# Patient Record
Sex: FEMALE | Race: WHITE | NOT HISPANIC OR LATINO
[De-identification: names, ages, dates, MRNs, and addresses within clinical notes are randomized per-mention and may not be internally consistent; named-entity substitution may affect disease eponyms.]

---

## 2017-01-23 ENCOUNTER — APPOINTMENT (OUTPATIENT)
Dept: GERIATRICS | Facility: CLINIC | Age: 47
End: 2017-01-23

## 2017-03-21 ENCOUNTER — APPOINTMENT (OUTPATIENT)
Dept: DERMATOLOGY | Facility: CLINIC | Age: 47
End: 2017-03-21

## 2017-03-21 VITALS — DIASTOLIC BLOOD PRESSURE: 80 MMHG | SYSTOLIC BLOOD PRESSURE: 122 MMHG

## 2017-03-21 DIAGNOSIS — L30.9 DERMATITIS, UNSPECIFIED: ICD-10-CM

## 2017-05-05 ENCOUNTER — OUTPATIENT (OUTPATIENT)
Dept: OUTPATIENT SERVICES | Facility: HOSPITAL | Age: 47
LOS: 1 days | Discharge: HOME | End: 2017-05-05

## 2017-05-05 DIAGNOSIS — C50.919 MALIGNANT NEOPLASM OF UNSPECIFIED SITE OF UNSPECIFIED FEMALE BREAST: ICD-10-CM

## 2017-05-23 ENCOUNTER — TRANSCRIPTION ENCOUNTER (OUTPATIENT)
Age: 47
End: 2017-05-23

## 2017-05-31 ENCOUNTER — OUTPATIENT (OUTPATIENT)
Dept: OUTPATIENT SERVICES | Facility: HOSPITAL | Age: 47
LOS: 1 days | Discharge: HOME | End: 2017-05-31

## 2017-05-31 DIAGNOSIS — C50.919 MALIGNANT NEOPLASM OF UNSPECIFIED SITE OF UNSPECIFIED FEMALE BREAST: ICD-10-CM

## 2017-06-06 ENCOUNTER — APPOINTMENT (OUTPATIENT)
Dept: HEMATOLOGY ONCOLOGY | Facility: CLINIC | Age: 47
End: 2017-06-06

## 2017-06-06 VITALS
DIASTOLIC BLOOD PRESSURE: 80 MMHG | TEMPERATURE: 98.1 F | HEART RATE: 66 BPM | SYSTOLIC BLOOD PRESSURE: 120 MMHG | RESPIRATION RATE: 16 BRPM | WEIGHT: 168.65 LBS | BODY MASS INDEX: 28.95 KG/M2 | OXYGEN SATURATION: 99 %

## 2017-06-06 RX ORDER — CEPHALEXIN 500 MG/1
500 CAPSULE ORAL
Qty: 30 | Refills: 0 | Status: DISCONTINUED | COMMUNITY
Start: 2017-05-24

## 2017-06-06 RX ORDER — DIVALPROEX SODIUM 250 MG/1
250 TABLET, EXTENDED RELEASE ORAL
Qty: 360 | Refills: 0 | Status: ACTIVE | COMMUNITY
Start: 2017-03-23

## 2017-06-06 RX ORDER — TACROLIMUS 1 MG/G
0.1 OINTMENT TOPICAL
Qty: 1 | Refills: 3 | Status: DISCONTINUED | COMMUNITY
Start: 2017-03-21 | End: 2017-06-06

## 2017-07-26 ENCOUNTER — EMERGENCY (EMERGENCY)
Facility: HOSPITAL | Age: 47
LOS: 1 days | Discharge: HOME | End: 2017-07-26
Attending: EMERGENCY MEDICINE | Admitting: EMERGENCY MEDICINE
Payer: COMMERCIAL

## 2017-07-26 VITALS
TEMPERATURE: 98 F | RESPIRATION RATE: 18 BRPM | OXYGEN SATURATION: 95 % | DIASTOLIC BLOOD PRESSURE: 84 MMHG | HEART RATE: 80 BPM | SYSTOLIC BLOOD PRESSURE: 120 MMHG

## 2017-07-26 VITALS
HEART RATE: 110 BPM | RESPIRATION RATE: 18 BRPM | SYSTOLIC BLOOD PRESSURE: 140 MMHG | TEMPERATURE: 99 F | OXYGEN SATURATION: 99 % | DIASTOLIC BLOOD PRESSURE: 89 MMHG

## 2017-07-26 DIAGNOSIS — Z90.13 ACQUIRED ABSENCE OF BILATERAL BREASTS AND NIPPLES: Chronic | ICD-10-CM

## 2017-07-26 LAB
APTT BLD: 28.9 SEC — SIGNIFICANT CHANGE UP (ref 27.5–37.4)
BASOPHILS # BLD AUTO: 0.1 K/UL — SIGNIFICANT CHANGE UP (ref 0–0.2)
BASOPHILS NFR BLD AUTO: 1.2 % — SIGNIFICANT CHANGE UP (ref 0–2)
EOSINOPHIL # BLD AUTO: 0.4 K/UL — SIGNIFICANT CHANGE UP (ref 0–0.5)
EOSINOPHIL NFR BLD AUTO: 4.1 % — SIGNIFICANT CHANGE UP (ref 0–6)
HCG SERPL-ACNC: <2 MIU/ML — SIGNIFICANT CHANGE UP
HCT VFR BLD CALC: 42.4 % — SIGNIFICANT CHANGE UP (ref 34.5–45)
HGB BLD-MCNC: 15 G/DL — SIGNIFICANT CHANGE UP (ref 11.5–15.5)
INR BLD: 1 RATIO — SIGNIFICANT CHANGE UP (ref 0.88–1.16)
LYMPHOCYTES # BLD AUTO: 2.8 K/UL — SIGNIFICANT CHANGE UP (ref 1–3.3)
LYMPHOCYTES # BLD AUTO: 32.5 % — SIGNIFICANT CHANGE UP (ref 13–44)
MAGNESIUM SERPL-MCNC: 2.3 MG/DL — SIGNIFICANT CHANGE UP (ref 1.6–2.6)
MCHC RBC-ENTMCNC: 34.3 PG — HIGH (ref 27–34)
MCHC RBC-ENTMCNC: 35.3 GM/DL — SIGNIFICANT CHANGE UP (ref 32–36)
MCV RBC AUTO: 97.3 FL — SIGNIFICANT CHANGE UP (ref 80–100)
MONOCYTES # BLD AUTO: 0.7 K/UL — SIGNIFICANT CHANGE UP (ref 0–0.9)
MONOCYTES NFR BLD AUTO: 7.9 % — SIGNIFICANT CHANGE UP (ref 2–14)
NEUTROPHILS # BLD AUTO: 4.8 K/UL — SIGNIFICANT CHANGE UP (ref 1.8–7.4)
NEUTROPHILS NFR BLD AUTO: 54.3 % — SIGNIFICANT CHANGE UP (ref 43–77)
PHOSPHATE SERPL-MCNC: 3.1 MG/DL — SIGNIFICANT CHANGE UP (ref 2.5–4.5)
PLATELET # BLD AUTO: 220 K/UL — SIGNIFICANT CHANGE UP (ref 150–400)
PROTHROM AB SERPL-ACNC: 10.9 SEC — SIGNIFICANT CHANGE UP (ref 9.8–12.7)
RBC # BLD: 4.36 M/UL — SIGNIFICANT CHANGE UP (ref 3.8–5.2)
RBC # FLD: 11.5 % — SIGNIFICANT CHANGE UP (ref 10.3–14.5)
WBC # BLD: 8.8 K/UL — SIGNIFICANT CHANGE UP (ref 3.8–10.5)
WBC # FLD AUTO: 8.8 K/UL — SIGNIFICANT CHANGE UP (ref 3.8–10.5)

## 2017-07-26 PROCEDURE — 85730 THROMBOPLASTIN TIME PARTIAL: CPT

## 2017-07-26 PROCEDURE — 85610 PROTHROMBIN TIME: CPT

## 2017-07-26 PROCEDURE — 84100 ASSAY OF PHOSPHORUS: CPT

## 2017-07-26 PROCEDURE — 96375 TX/PRO/DX INJ NEW DRUG ADDON: CPT | Mod: XU

## 2017-07-26 PROCEDURE — 99285 EMERGENCY DEPT VISIT HI MDM: CPT | Mod: 25

## 2017-07-26 PROCEDURE — 93005 ELECTROCARDIOGRAM TRACING: CPT

## 2017-07-26 PROCEDURE — 70496 CT ANGIOGRAPHY HEAD: CPT | Mod: 26

## 2017-07-26 PROCEDURE — 96374 THER/PROPH/DIAG INJ IV PUSH: CPT | Mod: XU

## 2017-07-26 PROCEDURE — 70496 CT ANGIOGRAPHY HEAD: CPT

## 2017-07-26 PROCEDURE — 85027 COMPLETE CBC AUTOMATED: CPT

## 2017-07-26 PROCEDURE — 93010 ELECTROCARDIOGRAM REPORT: CPT

## 2017-07-26 PROCEDURE — 84702 CHORIONIC GONADOTROPIN TEST: CPT

## 2017-07-26 PROCEDURE — 83735 ASSAY OF MAGNESIUM: CPT

## 2017-07-26 PROCEDURE — 99284 EMERGENCY DEPT VISIT MOD MDM: CPT | Mod: 25

## 2017-07-26 PROCEDURE — 80053 COMPREHEN METABOLIC PANEL: CPT

## 2017-07-26 RX ORDER — ASPIRIN/CALCIUM CARB/MAGNESIUM 324 MG
81 TABLET ORAL ONCE
Qty: 0 | Refills: 0 | Status: COMPLETED | OUTPATIENT
Start: 2017-07-26 | End: 2017-07-26

## 2017-07-26 RX ORDER — DIPHENHYDRAMINE HCL 50 MG
25 CAPSULE ORAL ONCE
Qty: 0 | Refills: 0 | Status: COMPLETED | OUTPATIENT
Start: 2017-07-26 | End: 2017-07-26

## 2017-07-26 RX ORDER — SODIUM CHLORIDE 9 MG/ML
1000 INJECTION INTRAMUSCULAR; INTRAVENOUS; SUBCUTANEOUS ONCE
Qty: 0 | Refills: 0 | Status: COMPLETED | OUTPATIENT
Start: 2017-07-26 | End: 2017-07-26

## 2017-07-26 RX ORDER — ACETAMINOPHEN 500 MG
1000 TABLET ORAL ONCE
Qty: 0 | Refills: 0 | Status: COMPLETED | OUTPATIENT
Start: 2017-07-26 | End: 2017-07-26

## 2017-07-26 RX ORDER — METOCLOPRAMIDE HCL 10 MG
10 TABLET ORAL ONCE
Qty: 0 | Refills: 0 | Status: COMPLETED | OUTPATIENT
Start: 2017-07-26 | End: 2017-07-26

## 2017-07-26 RX ORDER — ATORVASTATIN CALCIUM 80 MG/1
80 TABLET, FILM COATED ORAL ONCE
Qty: 0 | Refills: 0 | Status: COMPLETED | OUTPATIENT
Start: 2017-07-26 | End: 2017-07-26

## 2017-07-26 RX ADMIN — Medication 10 MILLIGRAM(S): at 10:50

## 2017-07-26 RX ADMIN — Medication 1000 MILLIGRAM(S): at 11:30

## 2017-07-26 RX ADMIN — Medication 81 MILLIGRAM(S): at 16:31

## 2017-07-26 RX ADMIN — Medication 400 MILLIGRAM(S): at 10:54

## 2017-07-26 RX ADMIN — Medication 25 MILLIGRAM(S): at 10:55

## 2017-07-26 RX ADMIN — ATORVASTATIN CALCIUM 80 MILLIGRAM(S): 80 TABLET, FILM COATED ORAL at 16:31

## 2017-07-26 RX ADMIN — SODIUM CHLORIDE 1000 MILLILITER(S): 9 INJECTION INTRAMUSCULAR; INTRAVENOUS; SUBCUTANEOUS at 10:48

## 2017-07-26 NOTE — ED ADULT NURSE REASSESSMENT NOTE - NS ED NURSE REASSESS COMMENT FT1
1400 ambulated in hallway to bathroom. Limping on left foot. States" symptoms are less than earlier but she still has double vision and left leg and arm numbness. A&Ox3. color pink. Skin W&D

## 2017-07-26 NOTE — ED PROVIDER NOTE - MEDICAL DECISION MAKING DETAILS
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 possible CVA vs. dural venous thrombosis. Migraine improved s/p cocktail of Reglan, Benadryl and Tylenol. CT venogram negative for venous thrombosis/hemorrhage/mass, Neurology consulted and offered admission for possible small acute CVA however patient decline, opting to leaving against medical advice but agreeable to atorvastatin x1 and ASA x1.

## 2017-07-26 NOTE — ED PROVIDER NOTE - OBJECTIVE STATEMENT
47F with anxiety/depression presents for severe 10/10 headache associated w/ left-sided facial and body numbness. 47F with history of breast CA s/p B/L mastectomy on Tamoxifen, anxiety/depression, migraines presents for severe 10/10 headache associated w/ left-sided facial and body numbness. Symptoms began last night at pt watching TV and did not resolve this morning. Pt also notes blurry vision and left-sided weakness which began 1 day prior to presentation. No photophobia, 47F with history of breast CA s/p B/L mastectomy on Tamoxifen, anxiety/depression, migraines presents for severe 10/10 headache associated w/ left-sided facial and body numbness. Symptoms began last night at pt watching TV and did not resolve this morning. Pt also notes double vision, left-sided weakness and some difficulty w/ speech also beginning last night. No photophobia, bowel/bladder incontinence, difficulty swallowing. 47F with history of breast CA s/p B/L mastectomy on Tamoxifen, anxiety/depression, migraines presents for severe 10/10 headache associated w/ left-sided facial and body numbness. Symptoms began last night at pt watching TV and did not resolve this morning. Pt also notes double vision, left-sided weakness and some difficulty w/ speech also beginning last night. No photophobia, bowel/bladder incontinence or difficulty swallowing.

## 2017-07-26 NOTE — ED PROVIDER NOTE - ATTENDING CONTRIBUTION TO CARE
attending Mallika: 47yF remote breast CA s/p mastectomy on Tamoxifen, migraine HA presents with gradual onset of L sided HA associated with L facial and L sided body numbness since yesterday. L side of body feels "heavy" and mildly weaker than right. Different from typical migraine. Also with blurry vision bilaterally. on examination, uncomfortable 2/2 headache, L sided facial/LUE/LLE decreased sensation, mildly weaker LLE and LUE compared to right, normal finger to nose, no pronator drift. Complex/hemiplegic migraine vs cerebral venous sinus thrombosis vs ?CVA. Will obtain CT head, CT venogram, labwork, neurology consultation and reassess.

## 2017-07-26 NOTE — ED PROVIDER NOTE - PROGRESS NOTE DETAILS
CT venogram negative for dural venous thrombosis, mass, hemorrhage or acute infarct. Headache improved s/p Benadryl IV and Reglan IV however pt still reports L-sided numbness/tingling. attending Mallika: Patient upset to have to wait to see neurology, requests MRI emergently because she states "my father is a doctor and he wants me to get it." Awaiting neurology evaluation. No indication for emergent MRI at this time. attending Mallika: Patient evaluated by neurology. 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 Offered admission to neurology for MRI and further management. Patient declines admission, wishes to leave to go to ER in [place] where her father works. Lengthy discussion with patient regarding risks of leaving. Patient understands and wishes to leave. Will dc AMA with ASA and Lipitor scripts. attending Mallika: Patient requests MRI emergently because she states "my father is a doctor and he wants me to get it." Awaiting neurology evaluation. No indication for emergent MRI at this time. Will continue to monitor closely. attending Mallika: Patient evaluated by neurology. 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 Offered admission to neurology for MRI and further management. Patient declines admission, wishes to leave to go to ER in [place] where her father works. Lengthy discussion with patient regarding risks of leaving. Patient understands and wishes to leave. Will dc AMA with ASA and Lipitor.

## 2017-07-26 NOTE — ED ADULT NURSE NOTE - OBJECTIVE STATEMENT
1004 47 yr old WF brought to ER by  for further eval and tx of double vision, severe HA and left sided numbness since 11pm last night. Hx of Breast cancer/bilateral mastectomy 4 yrs ago. Denies fever or chills. slurred speech. anxious. PERRL. On Tamoxifen x 4 yrs. no chemo taken. States difficulty walking. MAEx4

## 2017-07-26 NOTE — CONSULT NOTE ADULT - ASSESSMENT
47F with history of breast CA s/p B/L mastectomy on Tamoxifen, anxiety/depression, migraines presents for severe headache associated w/ left-sided facial and body numbness. PE was non-focal and unremarkable. CT head was negative. Differential diagnosis includes complicated migraine vs small stroke. Patient was asked for in-patient admission and MRI to rule out small stroke. Patient's father is a doctor and run a big hospital in [place]. He discussed with the team that he will do all workup at his hospital. Patient refuses admission at this time. For migraine, please administer Dexamethasone 10 mg and give Medrol pack on discharge.

## 2017-07-26 NOTE — CONSULT NOTE ADULT - SUBJECTIVE AND OBJECTIVE BOX
HPI:    47F with history of breast CA s/p B/L mastectomy on Tamoxifen, anxiety/depression, migraines presents for severe headache associated w/ left-sided facial and body numbness.     She was in her usual state of health until yesterday. Around 11 pm, she had sudden onset of double vision and numbness on her left side of the body including her face. Her symptoms worsened in the morning and she felt weakness on her left side.     She denied photophobia, nausea, bowel/bladder incontinence or difficulty swallowing.         PAST MEDICAL & SURGICAL HISTORY:  Anxiety  Depression  Migraine headache  Breast cancer  H/O bilateral mastectomy    FAMILY HISTORY:    Allergies    No Known Allergies    Intolerances        SHx - No smoking, No ETOH, No drug abuse        Vital Signs Last 24 Hrs    T(C): 36.9 (26 Jul 2017 16:25), Max: 37 (26 Jul 2017 09:51)  T(F): 98.4 (26 Jul 2017 16:25), Max: 98.6 (26 Jul 2017 09:51)  HR: 80 (26 Jul 2017 16:25) (68 - 110)  BP: 120/84 (26 Jul 2017 16:25) (110/73 - 140/89)  BP(mean): --  RR: 18 (26 Jul 2017 16:25) (16 - 18)  SpO2: 95% (26 Jul 2017 16:25) (95% - 99%)    General Exam:     General appearance: No acute distress                   Neurological Exam:    Mental Status: Orientated to self, date and place.  Attention intact.  No dysarthria, aphasia or neglect.  Knowledge intact.      Cranial Nerves:   PERRL, EOMI, VFF, no nystagmus or diplopia.  CN V1-3 intact to light touch and pinprick.  No facial asymmetry.  Hearing intact to finger rub bilaterally.  Tongue, uvula and palate midline.  Sternocleidomastoid and Trapezius intact bilaterally.    Motor:   Tone: normal.                  Strength:     [] Upper extremity                      Delt       Bicep    Tricep                                                  R         5/5        5/5        5/5       5/5                                               L          5/5        5/5        5/5       5/5  [] Lower extremity                       HF          KE          KF        DF         PF                                               R        5/5        5/5        5/5       5/5       5/5                                               L         5/5        5/5       5/5       5/5        5/5  Pronator drift: none                 Dysmetria: None to finger-nose-finger or heel-shin-heel  No truncal ataxia.    Tremor: No resting, postural or action tremor.  No myoclonus.    Sensation: intact to light touch, pinprick, vibration and proprioception    Deep Tendon Reflexes: 3+ bilateral biceps, triceps, brachioradialis, knee and ankle, non-sustained clonus bilaterally   Toes flexor bilaterally    Gait: normal and stable.      Other:    07-26    142  |  100  |  14  ----------------------------<  84  4.2   |  28  |  0.85    Ca    9.3      26 Jul 2017 11:09  Phos  3.1     07-26  Mg     2.3     07-26    TPro  7.0  /  Alb  4.3  /  TBili  0.2  /  DBili  x   /  AST  20  /  ALT  16  /  AlkPhos  54  07-26 07-26    142  |  100  |  14  ----------------------------<  84  4.2   |  28  |  0.85    Ca    9.3      26 Jul 2017 11:09  Phos  3.1     07-26  Mg     2.3     07-26    TPro  7.0  /  Alb  4.3  /  TBili  0.2  /  DBili  x   /  AST  20  /  ALT  16  /  AlkPhos  54  07-26                          15.0   8.8   )-----------( 220      ( 26 Jul 2017 11:09 )             42.4       Radiology    CT head     < from: CT Head w/o Cont (10.18.12 @ 14:48) >  IMPRESSION:  Unremarkable noncontrast CT of the brain.    < end of copied text >

## 2017-10-12 ENCOUNTER — APPOINTMENT (OUTPATIENT)
Dept: NEUROLOGY | Facility: CLINIC | Age: 47
End: 2017-10-12
Payer: COMMERCIAL

## 2017-10-12 VITALS
HEIGHT: 64 IN | DIASTOLIC BLOOD PRESSURE: 74 MMHG | WEIGHT: 167 LBS | BODY MASS INDEX: 28.51 KG/M2 | HEART RATE: 56 BPM | SYSTOLIC BLOOD PRESSURE: 125 MMHG

## 2017-10-12 DIAGNOSIS — G43.109 MIGRAINE WITH AURA, NOT INTRACTABLE, W/OUT STATUS MIGRAINOSUS: ICD-10-CM

## 2017-10-12 PROCEDURE — 99244 OFF/OP CNSLTJ NEW/EST MOD 40: CPT

## 2017-11-20 ENCOUNTER — APPOINTMENT (OUTPATIENT)
Dept: NEUROLOGY | Facility: CLINIC | Age: 47
End: 2017-11-20

## 2017-12-06 ENCOUNTER — OUTPATIENT (OUTPATIENT)
Dept: OUTPATIENT SERVICES | Facility: HOSPITAL | Age: 47
LOS: 1 days | Discharge: HOME | End: 2017-12-06

## 2017-12-06 DIAGNOSIS — C50.919 MALIGNANT NEOPLASM OF UNSPECIFIED SITE OF UNSPECIFIED FEMALE BREAST: ICD-10-CM

## 2017-12-06 DIAGNOSIS — Z90.13 ACQUIRED ABSENCE OF BILATERAL BREASTS AND NIPPLES: Chronic | ICD-10-CM

## 2017-12-12 ENCOUNTER — APPOINTMENT (OUTPATIENT)
Dept: HEMATOLOGY ONCOLOGY | Facility: CLINIC | Age: 47
End: 2017-12-12
Payer: COMMERCIAL

## 2017-12-12 VITALS
HEART RATE: 79 BPM | OXYGEN SATURATION: 95 % | RESPIRATION RATE: 16 BRPM | DIASTOLIC BLOOD PRESSURE: 73 MMHG | TEMPERATURE: 98.8 F | BODY MASS INDEX: 27.89 KG/M2 | WEIGHT: 162.5 LBS | SYSTOLIC BLOOD PRESSURE: 111 MMHG

## 2017-12-12 DIAGNOSIS — M79.604 PAIN IN RIGHT LEG: ICD-10-CM

## 2017-12-12 DIAGNOSIS — M79.605 PAIN IN RIGHT LEG: ICD-10-CM

## 2017-12-12 PROCEDURE — 99214 OFFICE O/P EST MOD 30 MIN: CPT

## 2017-12-12 RX ORDER — MAGNESIUM OXIDE 241.3 MG/1000MG
400 TABLET ORAL TWICE DAILY
Qty: 60 | Refills: 3 | Status: DISCONTINUED | COMMUNITY
Start: 2017-10-12 | End: 2017-12-12

## 2018-03-12 ENCOUNTER — APPOINTMENT (OUTPATIENT)
Dept: FAMILY MEDICINE | Facility: CLINIC | Age: 48
End: 2018-03-12
Payer: COMMERCIAL

## 2018-03-12 VITALS
OXYGEN SATURATION: 95 % | DIASTOLIC BLOOD PRESSURE: 75 MMHG | HEIGHT: 64 IN | SYSTOLIC BLOOD PRESSURE: 112 MMHG | TEMPERATURE: 99 F | HEART RATE: 96 BPM

## 2018-03-12 DIAGNOSIS — R09.82 POSTNASAL DRIP: ICD-10-CM

## 2018-03-12 PROCEDURE — 99203 OFFICE O/P NEW LOW 30 MIN: CPT

## 2018-03-12 RX ORDER — DEXTROAMPHETAMINE SACCHARATE, AMPHETAMINE ASPARTATE, DEXTROAMPHETAMINE SULFATE AND AMPHETAMINE SULFATE 1.25; 1.25; 1.25; 1.25 MG/1; MG/1; MG/1; MG/1
5 TABLET ORAL
Qty: 60 | Refills: 0 | Status: COMPLETED | COMMUNITY
Start: 2017-03-23 | End: 2018-03-12

## 2018-03-12 RX ORDER — FAMCICLOVIR 500 MG/1
500 TABLET, FILM COATED ORAL
Qty: 60 | Refills: 0 | Status: COMPLETED | COMMUNITY
Start: 2017-04-19 | End: 2018-03-12

## 2018-03-12 RX ORDER — HYDROCORTISONE 25 MG/G
2.5 OINTMENT TOPICAL
Qty: 1 | Refills: 1 | Status: COMPLETED | COMMUNITY
Start: 2017-03-21 | End: 2018-03-12

## 2018-05-03 ENCOUNTER — APPOINTMENT (OUTPATIENT)
Dept: RHEUMATOLOGY | Facility: CLINIC | Age: 48
End: 2018-05-03

## 2018-07-13 ENCOUNTER — RX RENEWAL (OUTPATIENT)
Age: 48
End: 2018-07-13

## 2018-08-08 PROBLEM — G43.909 MIGRAINE, UNSPECIFIED, NOT INTRACTABLE, WITHOUT STATUS MIGRAINOSUS: Chronic | Status: ACTIVE | Noted: 2017-07-26

## 2018-10-25 ENCOUNTER — APPOINTMENT (OUTPATIENT)
Dept: OBGYN | Facility: CLINIC | Age: 48
End: 2018-10-25
Payer: COMMERCIAL

## 2018-10-25 ENCOUNTER — ASOB RESULT (OUTPATIENT)
Age: 48
End: 2018-10-25

## 2018-10-25 VITALS
HEART RATE: 93 BPM | HEIGHT: 64 IN | WEIGHT: 159.5 LBS | BODY MASS INDEX: 27.23 KG/M2 | DIASTOLIC BLOOD PRESSURE: 79 MMHG | SYSTOLIC BLOOD PRESSURE: 124 MMHG

## 2018-10-25 DIAGNOSIS — R10.30 LOWER ABDOMINAL PAIN, UNSPECIFIED: ICD-10-CM

## 2018-10-25 PROCEDURE — 99214 OFFICE O/P EST MOD 30 MIN: CPT

## 2018-10-25 PROCEDURE — 76830 TRANSVAGINAL US NON-OB: CPT

## 2018-10-29 ENCOUNTER — OTHER (OUTPATIENT)
Age: 48
End: 2018-10-29

## 2018-10-30 ENCOUNTER — OTHER (OUTPATIENT)
Age: 48
End: 2018-10-30

## 2018-11-16 ENCOUNTER — APPOINTMENT (OUTPATIENT)
Dept: INTERNAL MEDICINE | Facility: CLINIC | Age: 48
End: 2018-11-16

## 2018-12-29 ENCOUNTER — TRANSCRIPTION ENCOUNTER (OUTPATIENT)
Age: 48
End: 2018-12-29

## 2019-01-15 ENCOUNTER — OUTPATIENT (OUTPATIENT)
Dept: OUTPATIENT SERVICES | Facility: HOSPITAL | Age: 49
LOS: 1 days | Discharge: HOME | End: 2019-01-15

## 2019-01-15 DIAGNOSIS — Z90.13 ACQUIRED ABSENCE OF BILATERAL BREASTS AND NIPPLES: Chronic | ICD-10-CM

## 2019-01-15 DIAGNOSIS — C50.919 MALIGNANT NEOPLASM OF UNSPECIFIED SITE OF UNSPECIFIED FEMALE BREAST: ICD-10-CM

## 2019-01-16 ENCOUNTER — APPOINTMENT (OUTPATIENT)
Dept: OBGYN | Facility: CLINIC | Age: 49
End: 2019-01-16

## 2019-01-19 NOTE — REVIEW OF SYSTEMS
[Negative] : Endocrine [FreeTextEntry9] : see above [de-identified] : frequent cold sores [de-identified] : see above

## 2019-01-19 NOTE — HISTORY OF PRESENT ILLNESS
[Disease: _____________________] : Disease: [unfilled] [T: ___] : T[unfilled] [N: ___] : N[unfilled] [M: ___] : M[unfilled] [AJCC Stage: ____] : AJCC Stage: [unfilled] [de-identified] : Pt reported first noting a left breast superficial nodule in the upper outer quadrant, "BB size", which was "hard and circular".  She keept it to herself initially but approximately six weeks later noted that it had increased in size and that there were other palpable areas of nodularity beneath her nipple - areolar complex.  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  In light of the above, the patient went on to have an MRI of her breasts with the finding in the left breast of extensive suspicious enhancement with multicentric disease suspected, with a note of a 6 mm palpable superficial subdermal suspicious nodule noted in the left breast upper outer quadrant, with no evelyne dermal enhancement noted; the right breast had evidence of a suspicious 1.5 cm enhancing mass at the posterior lower slightly inner right breast for which biopsy was recommended.  After considering the above, the patient ultimately decided on having a bilateral modified radical mastectomy with an expander placement which was performed under the care of Dr. Timothy Mcclain at [facility] on 01/29/2013 with the finding in the left breast of invasive ductal carcinoma and ductal carcinoma in situ, multicentric, specifically in the 11 o'clock axis  a well differentiated 0.4 cm invasive ductal carcinoma, in the 2 o'clock axis invasive ductal carcinoma, moderately differentiated measuring 1.2 cm in greatest diameter.  In the right breast there was a atypical lobular hyperplasia with focal fibrocystic changes and microcalcifications, but no invasive carcinoma. The invasive carcinoma in the left breast was ER positive, MI positive, HER2 dirk negative; 0/2 left sentinel lymph nodes were involved with carcinoma.  The right breast was negative. An OncotypeDX returned with RS 14 = 9% risk of mets at 10 years on tamoxifen alone.  She started tamoxifen 2/25/13. [de-identified] : Patient is here for a f/up visit She had called ~ 2 weeks ago with a c/o "severe pain in b/l prox lower extremities" right leg > left, and more at night; not provoked by any activity or trauma Started experiencing these symptoms on the beginning of November (~ 6 weeks ago) prior to the onset of this pain, the pain was limited to the medial aspect of the R knee  These symptoms have subsided in the last week, and are mostly resolved She also c/o long standing intermittent L toe pain, which has been most intense in the last week Does not take any pain medication Hand joint pain (chronic, unchanged) Also has had an increase in occurrence of cold sores recently\par HEr migraines have decreased in severity and frequency in the last 2-3 months. Other than the above symptoms, she remains with a stable weight and a very stable performance status\par \par Menstrual periods have been fairly regular, missed a period last month. Tolerating tamoxifen well.\par \par

## 2019-01-19 NOTE — PHYSICAL EXAM
[Fully active, able to carry on all pre-disease performance without restriction] : Status 0 - Fully active, able to carry on all pre-disease performance without restriction [Normal] : affect appropriate [de-identified] : s/p B/L MRM with reconstruction, no masses; B/L ax neg [de-identified] : mild pain in the L first toe on ROM, and an area of bony projection over the L 3rd MTP joint

## 2019-01-22 ENCOUNTER — APPOINTMENT (OUTPATIENT)
Dept: HEMATOLOGY ONCOLOGY | Facility: CLINIC | Age: 49
End: 2019-01-22
Payer: COMMERCIAL

## 2019-02-15 ENCOUNTER — OUTPATIENT (OUTPATIENT)
Dept: OUTPATIENT SERVICES | Facility: HOSPITAL | Age: 49
LOS: 1 days | Discharge: HOME | End: 2019-02-15

## 2019-02-15 DIAGNOSIS — Z90.13 ACQUIRED ABSENCE OF BILATERAL BREASTS AND NIPPLES: Chronic | ICD-10-CM

## 2019-02-15 DIAGNOSIS — C50.919 MALIGNANT NEOPLASM OF UNSPECIFIED SITE OF UNSPECIFIED FEMALE BREAST: ICD-10-CM

## 2019-02-27 ENCOUNTER — APPOINTMENT (OUTPATIENT)
Dept: HEMATOLOGY ONCOLOGY | Facility: CLINIC | Age: 49
End: 2019-02-27
Payer: COMMERCIAL

## 2019-02-27 VITALS
TEMPERATURE: 98.4 F | HEART RATE: 80 BPM | WEIGHT: 162.04 LBS | DIASTOLIC BLOOD PRESSURE: 70 MMHG | RESPIRATION RATE: 16 BRPM | BODY MASS INDEX: 27.81 KG/M2 | OXYGEN SATURATION: 99 % | SYSTOLIC BLOOD PRESSURE: 120 MMHG

## 2019-02-27 PROCEDURE — 99214 OFFICE O/P EST MOD 30 MIN: CPT

## 2019-02-27 RX ORDER — FLUTICASONE PROPIONATE 50 UG/1
50 SPRAY, METERED NASAL DAILY
Qty: 1 | Refills: 0 | Status: DISCONTINUED | COMMUNITY
Start: 2018-03-12 | End: 2019-02-27

## 2019-03-11 NOTE — REVIEW OF SYSTEMS
[Negative] : Integumentary [FreeTextEntry9] : see above [de-identified] : as above [de-identified] : see above

## 2019-03-11 NOTE — PHYSICAL EXAM
[Fully active, able to carry on all pre-disease performance without restriction] : Status 0 - Fully active, able to carry on all pre-disease performance without restriction [Normal] : affect appropriate [de-identified] : s/p B/L MRM with reconstruction, no masses; B/L ax neg [de-identified] : mild pain in the L first toe on ROM, and an area of bony projection over the L 3rd MTP joint

## 2019-03-11 NOTE — HISTORY OF PRESENT ILLNESS
[Disease: _____________________] : Disease: [unfilled] [T: ___] : T[unfilled] [N: ___] : N[unfilled] [M: ___] : M[unfilled] [AJCC Stage: ____] : AJCC Stage: [unfilled] [de-identified] : Pt reported first noting a left breast superficial nodule in the upper outer quadrant, "BB size", which was "hard and circular".  She keept it to herself initially but approximately six weeks later noted that it had increased in size and that there were other palpable areas of nodularity beneath her nipple - areolar complex.  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  In light of the above, the patient went on to have an MRI of her breasts with the finding in the left breast of extensive suspicious enhancement with multicentric disease suspected, with a note of a 6 mm palpable superficial subdermal suspicious nodule noted in the left breast upper outer quadrant, with no evelyne dermal enhancement noted; the right breast had evidence of a suspicious 1.5 cm enhancing mass at the posterior lower slightly inner right breast for which biopsy was recommended.  After considering the above, the patient ultimately decided on having a bilateral modified radical mastectomy with an expander placement which was performed under the care of Dr. Timothy Mcclain at [facility] on 01/29/2013 with the finding in the left breast of invasive ductal carcinoma and ductal carcinoma in situ, multicentric, specifically in the 11 o'clock axis  a well differentiated 0.4 cm invasive ductal carcinoma, in the 2 o'clock axis invasive ductal carcinoma, moderately differentiated measuring 1.2 cm in greatest diameter.  In the right breast there was a atypical lobular hyperplasia with focal fibrocystic changes and microcalcifications, but no invasive carcinoma. The invasive carcinoma in the left breast was ER positive, SD positive, HER2 dirk negative; 0/2 left sentinel lymph nodes were involved with carcinoma.  The right breast was negative. An OncotypeDX returned with RS 14 = 9% risk of mets at 10 years on tamoxifen alone.  She started tamoxifen 2/25/13. [de-identified] : In the interim her father  of leukemia in ; she is very very upset as he avoided a dx for weeks while losing weight.\par \par SHe had right groin pain and was found to have a labrum tear; being recommended to have orthopedic surgery / repair and is considering it. \par \par She cont to have migraine headaches with diplopia freq during these episodes (says it is chronic) w/o change. Outside of migraine no diplopia.\par \par She remains with a good appetite, stable weight and performance status\par \par Menstrual periods have been regular

## 2019-03-24 ENCOUNTER — TRANSCRIPTION ENCOUNTER (OUTPATIENT)
Age: 49
End: 2019-03-24

## 2019-03-28 ENCOUNTER — APPOINTMENT (OUTPATIENT)
Dept: FAMILY MEDICINE | Facility: CLINIC | Age: 49
End: 2019-03-28
Payer: COMMERCIAL

## 2019-03-28 VITALS
SYSTOLIC BLOOD PRESSURE: 101 MMHG | OXYGEN SATURATION: 98 % | TEMPERATURE: 99 F | HEART RATE: 94 BPM | HEIGHT: 64 IN | DIASTOLIC BLOOD PRESSURE: 69 MMHG | WEIGHT: 162 LBS | BODY MASS INDEX: 27.66 KG/M2

## 2019-03-28 DIAGNOSIS — Z87.09 PERSONAL HISTORY OF OTHER DISEASES OF THE RESPIRATORY SYSTEM: ICD-10-CM

## 2019-03-28 PROCEDURE — 99214 OFFICE O/P EST MOD 30 MIN: CPT

## 2019-03-28 RX ORDER — VENLAFAXINE HYDROCHLORIDE 75 MG/1
75 CAPSULE, EXTENDED RELEASE ORAL
Refills: 0 | Status: COMPLETED | COMMUNITY
End: 2019-03-28

## 2019-03-28 RX ORDER — DIAZEPAM 5 MG/1
5 TABLET ORAL
Qty: 10 | Refills: 0 | Status: COMPLETED | COMMUNITY
Start: 2018-12-21

## 2019-03-28 RX ORDER — VALACYCLOVIR 500 MG/1
500 TABLET, FILM COATED ORAL
Qty: 60 | Refills: 0 | Status: COMPLETED | COMMUNITY
Start: 2018-05-06

## 2019-03-28 RX ORDER — VENLAFAXINE HYDROCHLORIDE 75 MG/1
75 TABLET, EXTENDED RELEASE ORAL
Qty: 90 | Refills: 0 | Status: ACTIVE | COMMUNITY
Start: 2019-03-08

## 2019-03-28 RX ORDER — DEXTROAMPHETAMINE SULFATE 5 MG/1
5 TABLET ORAL
Qty: 60 | Refills: 0 | Status: COMPLETED | COMMUNITY
Start: 2019-03-11

## 2019-03-28 NOTE — PHYSICAL EXAM
[No Acute Distress] : no acute distress [Ill-Appearing] : ill-appearing [Normal TMs] : both tympanic membranes were normal [Supple] : supple [No Lymphadenopathy] : no lymphadenopathy [Thyroid Normal, No Nodules] : the thyroid was normal and there were no nodules present [No Respiratory Distress] : no respiratory distress  [Clear to Auscultation] : lungs were clear to auscultation bilaterally [Normal Rate] : normal rate  [Regular Rhythm] : with a regular rhythm [Normal S1, S2] : normal S1 and S2 [No Murmur] : no murmur heard [Normal Posterior Cervical Nodes] : no posterior cervical lymphadenopathy [Normal Anterior Cervical Nodes] : no anterior cervical lymphadenopathy [Normal Gait] : normal gait [Normal Affect] : the affect was normal [Normal Insight/Judgement] : insight and judgment were intact [de-identified] : postnasal drip present, maxillary sinus tenderness [de-identified] : persistent cough

## 2019-03-28 NOTE — REVIEW OF SYSTEMS
[Fever] : fever [Chills] : chills [Fatigue] : fatigue [Nasal Discharge] : nasal discharge [Sore Throat] : sore throat [Shortness Of Breath] : no shortness of breath [Wheezing] : no wheezing [Cough] : cough [Negative] : Heme/Lymph

## 2019-03-28 NOTE — HISTORY OF PRESENT ILLNESS
[FreeTextEntry8] : cc-- cough, congestion\par \par 48 yo F with breast ca currently on tamoxifen, bipolar depression, presents for cough/congestion for past 9 days. Initially, she reports having a very sore throat, then bodyaches, cough/congestion. She had subjective fevers/chills. Since then, she was starting to feel better when she started getting worse again. She feels she may be getting another fever but has not any yet. She is bringing up greenish sputum. She denies feeling SOB. She denies any sick contacts currently. She tries not to take any medicine if she can help it.

## 2019-07-15 ENCOUNTER — OUTPATIENT (OUTPATIENT)
Dept: OUTPATIENT SERVICES | Facility: HOSPITAL | Age: 49
LOS: 1 days | End: 2019-07-15

## 2019-07-15 ENCOUNTER — APPOINTMENT (OUTPATIENT)
Dept: ULTRASOUND IMAGING | Facility: CLINIC | Age: 49
End: 2019-07-15
Payer: COMMERCIAL

## 2019-07-15 DIAGNOSIS — Z90.13 ACQUIRED ABSENCE OF BILATERAL BREASTS AND NIPPLES: Chronic | ICD-10-CM

## 2019-07-15 DIAGNOSIS — R10.31 RIGHT LOWER QUADRANT PAIN: ICD-10-CM

## 2019-07-15 PROCEDURE — 76830 TRANSVAGINAL US NON-OB: CPT

## 2019-07-15 PROCEDURE — 76830 TRANSVAGINAL US NON-OB: CPT | Mod: 26

## 2019-07-25 ENCOUNTER — APPOINTMENT (OUTPATIENT)
Dept: OBGYN | Facility: CLINIC | Age: 49
End: 2019-07-25
Payer: COMMERCIAL

## 2019-07-25 VITALS
DIASTOLIC BLOOD PRESSURE: 70 MMHG | SYSTOLIC BLOOD PRESSURE: 110 MMHG | WEIGHT: 160 LBS | HEIGHT: 64 IN | OXYGEN SATURATION: 97 % | HEART RATE: 100 BPM | BODY MASS INDEX: 27.31 KG/M2

## 2019-07-25 DIAGNOSIS — Z12.11 ENCOUNTER FOR SCREENING FOR MALIGNANT NEOPLASM OF COLON: ICD-10-CM

## 2019-07-25 DIAGNOSIS — R10.2 PELVIC AND PERINEAL PAIN: ICD-10-CM

## 2019-07-25 LAB
HCG UR QL: NEGATIVE
QUALITY CONTROL: YES

## 2019-07-25 PROCEDURE — 99214 OFFICE O/P EST MOD 30 MIN: CPT

## 2019-07-25 RX ORDER — AZITHROMYCIN 250 MG/1
250 TABLET, FILM COATED ORAL
Qty: 1 | Refills: 0 | Status: DISCONTINUED | COMMUNITY
Start: 2019-03-28 | End: 2019-07-25

## 2019-07-25 NOTE — CHIEF COMPLAINT
[Follow Up] : follow up GYN visit [FreeTextEntry1] : c/o pelvic pain .Had IUD removed 7/15. Rev TVS with pt. Pain not aggrav or alleviated by anything specific. No GI/ sxs. Pt also saw ortho and told had fluid in hip--w/u so far is neg.

## 2019-07-26 LAB
C TRACH RRNA SPEC QL NAA+PROBE: NOT DETECTED
HPV HIGH+LOW RISK DNA PNL CVX: NOT DETECTED
N GONORRHOEA RRNA SPEC QL NAA+PROBE: NOT DETECTED
SOURCE TP AMPLIFICATION: NORMAL

## 2019-07-30 LAB — CYTOLOGY CVX/VAG DOC THIN PREP: NORMAL

## 2019-08-30 ENCOUNTER — OUTPATIENT (OUTPATIENT)
Dept: OUTPATIENT SERVICES | Facility: HOSPITAL | Age: 49
LOS: 1 days | Discharge: HOME | End: 2019-08-30

## 2019-08-30 DIAGNOSIS — Z90.13 ACQUIRED ABSENCE OF BILATERAL BREASTS AND NIPPLES: Chronic | ICD-10-CM

## 2019-08-30 DIAGNOSIS — C50.919 MALIGNANT NEOPLASM OF UNSPECIFIED SITE OF UNSPECIFIED FEMALE BREAST: ICD-10-CM

## 2019-09-04 ENCOUNTER — APPOINTMENT (OUTPATIENT)
Dept: HEMATOLOGY ONCOLOGY | Facility: CLINIC | Age: 49
End: 2019-09-04

## 2019-10-04 ENCOUNTER — APPOINTMENT (OUTPATIENT)
Dept: OBGYN | Facility: CLINIC | Age: 49
End: 2019-10-04

## 2019-10-12 ENCOUNTER — OUTPATIENT (OUTPATIENT)
Dept: OUTPATIENT SERVICES | Facility: HOSPITAL | Age: 49
LOS: 1 days | Discharge: HOME | End: 2019-10-12

## 2019-10-12 DIAGNOSIS — Z90.13 ACQUIRED ABSENCE OF BILATERAL BREASTS AND NIPPLES: Chronic | ICD-10-CM

## 2019-10-12 DIAGNOSIS — C50.919 MALIGNANT NEOPLASM OF UNSPECIFIED SITE OF UNSPECIFIED FEMALE BREAST: ICD-10-CM

## 2019-10-18 ENCOUNTER — APPOINTMENT (OUTPATIENT)
Dept: HEMATOLOGY ONCOLOGY | Facility: CLINIC | Age: 49
End: 2019-10-18
Payer: COMMERCIAL

## 2019-10-18 VITALS
TEMPERATURE: 98.2 F | SYSTOLIC BLOOD PRESSURE: 112 MMHG | OXYGEN SATURATION: 96 % | BODY MASS INDEX: 28.19 KG/M2 | HEART RATE: 85 BPM | DIASTOLIC BLOOD PRESSURE: 74 MMHG | RESPIRATION RATE: 16 BRPM | WEIGHT: 164.24 LBS

## 2019-10-18 PROCEDURE — 99214 OFFICE O/P EST MOD 30 MIN: CPT

## 2019-10-21 NOTE — REVIEW OF SYSTEMS
[Negative] : Endocrine [FreeTextEntry9] : see above [de-identified] : as above [de-identified] : see above

## 2019-10-21 NOTE — PHYSICAL EXAM
[Fully active, able to carry on all pre-disease performance without restriction] : Status 0 - Fully active, able to carry on all pre-disease performance without restriction [Normal] : affect appropriate [de-identified] : s/p B/L MRM with reconstruction, no masses; B/L ax neg

## 2019-10-21 NOTE — HISTORY OF PRESENT ILLNESS
[Disease: _____________________] : Disease: [unfilled] [T: ___] : T[unfilled] [N: ___] : N[unfilled] [M: ___] : M[unfilled] [AJCC Stage: ____] : AJCC Stage: [unfilled] [de-identified] : Pt reported first noting a left breast superficial nodule in the upper outer quadrant, "BB size", which was "hard and circular".  She keept it to herself initially but approximately six weeks later noted that it had increased in size and that there were other palpable areas of nodularity beneath her nipple - areolar complex.  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  In light of the above, the patient went on to have an MRI of her breasts with the finding in the left breast of extensive suspicious enhancement with multicentric disease suspected, with a note of a 6 mm palpable superficial subdermal suspicious nodule noted in the left breast upper outer quadrant, with no evelnye dermal enhancement noted; the right breast had evidence of a suspicious 1.5 cm enhancing mass at the posterior lower slightly inner right breast for which biopsy was recommended.  After considering the above, the patient ultimately decided on having a bilateral modified radical mastectomy with an expander placement which was performed under the care of Dr. Timothy Mcclain at [facility] on 01/29/2013 with the finding in the left breast of invasive ductal carcinoma and ductal carcinoma in situ, multicentric, specifically in the 11 o'clock axis  a well differentiated 0.4 cm invasive ductal carcinoma, in the 2 o'clock axis invasive ductal carcinoma, moderately differentiated measuring 1.2 cm in greatest diameter.  In the right breast there was a atypical lobular hyperplasia with focal fibrocystic changes and microcalcifications, but no invasive carcinoma. The invasive carcinoma in the left breast was ER positive, MN positive, HER2 dirk negative; 0/2 left sentinel lymph nodes were involved with carcinoma.  The right breast was negative. An OncotypeDX returned with RS 14 = 9% risk of mets at 10 years on tamoxifen alone.  She started tamoxifen 2/25/13. [de-identified] : She remains with a good appetite, stable weight and performance status.\par \par Still sad / upset re father's death. \par \par Menstrual periods have been irregular; last 8 mo ago.

## 2019-12-06 ENCOUNTER — OTHER (OUTPATIENT)
Age: 49
End: 2019-12-06

## 2020-02-03 ENCOUNTER — APPOINTMENT (OUTPATIENT)
Dept: PLASTIC SURGERY | Facility: CLINIC | Age: 50
End: 2020-02-03
Payer: COMMERCIAL

## 2020-02-03 VITALS — BODY MASS INDEX: 27.83 KG/M2 | WEIGHT: 163 LBS | HEIGHT: 64 IN

## 2020-02-03 PROCEDURE — 99205 OFFICE O/P NEW HI 60 MIN: CPT

## 2020-02-04 NOTE — HISTORY OF PRESENT ILLNESS
[FreeTextEntry1] : 42 yo female presents with pain following bilateral breast reconstruction.  The patient states she was diagnosed with left breast cancer 7 years ago.  She underwent bilateral skin sparing mastectomy and had tissue expanders placed at [facility] in .  She did not undergo any radiation or chemotherapy.  She had implants placed 6 months after her first surgery.  She has Allergan Natrelle 600 cc silicone implants in place, [id number].  She now reports discomfort of her breasts, right more than left and reports restriction in her upper extremities.  \par \par The patient has additional history of bipolar disorder.  She denies additional surgeries.  She has one child, .

## 2020-02-24 ENCOUNTER — APPOINTMENT (OUTPATIENT)
Dept: PLASTIC SURGERY | Facility: CLINIC | Age: 50
End: 2020-02-24

## 2020-03-11 ENCOUNTER — OUTPATIENT (OUTPATIENT)
Dept: OUTPATIENT SERVICES | Facility: HOSPITAL | Age: 50
LOS: 1 days | Discharge: HOME | End: 2020-03-11

## 2020-03-11 DIAGNOSIS — Z90.13 ACQUIRED ABSENCE OF BILATERAL BREASTS AND NIPPLES: Chronic | ICD-10-CM

## 2020-03-11 DIAGNOSIS — C50.919 MALIGNANT NEOPLASM OF UNSPECIFIED SITE OF UNSPECIFIED FEMALE BREAST: ICD-10-CM

## 2020-03-31 ENCOUNTER — APPOINTMENT (OUTPATIENT)
Dept: HEMATOLOGY ONCOLOGY | Facility: CLINIC | Age: 50
End: 2020-03-31
Payer: COMMERCIAL

## 2020-03-31 PROCEDURE — 99214 OFFICE O/P EST MOD 30 MIN: CPT | Mod: 95

## 2020-03-31 NOTE — HISTORY OF PRESENT ILLNESS
[Home] : at home, [unfilled] , at the time of the visit. [Self] : self [Disease: _____________________] : Disease: [unfilled] [T: ___] : T[unfilled] [N: ___] : N[unfilled] [M: ___] : M[unfilled] [AJCC Stage: ____] : AJCC Stage: [unfilled] [FreeTextEntry2] : Lorena Nunez [de-identified] : Pt reported first noting a left breast superficial nodule in the upper outer quadrant, "BB size", which was "hard and circular".  She keept it to herself initially but approximately six weeks later noted that it had increased in size and that there were other palpable areas of nodularity beneath her nipple - areolar complex.  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  In light of the above, the patient went on to have an MRI of her breasts with the finding in the left breast of extensive suspicious enhancement with multicentric disease suspected, with a note of a 6 mm palpable superficial subdermal suspicious nodule noted in the left breast upper outer quadrant, with no evelyne dermal enhancement noted; the right breast had evidence of a suspicious 1.5 cm enhancing mass at the posterior lower slightly inner right breast for which biopsy was recommended.  After considering the above, the patient ultimately decided on having a bilateral modified radical mastectomy with an expander placement which was performed under the care of Dr. Timothy Mcclain at [facility] on 01/29/2013 with the finding in the left breast of invasive ductal carcinoma and ductal carcinoma in situ, multicentric, specifically in the 11 o'clock axis  a well differentiated 0.4 cm invasive ductal carcinoma, in the 2 o'clock axis invasive ductal carcinoma, moderately differentiated measuring 1.2 cm in greatest diameter.  In the right breast there was a atypical lobular hyperplasia with focal fibrocystic changes and microcalcifications, but no invasive carcinoma. The invasive carcinoma in the left breast was ER positive, KS positive, HER2 dirk negative; 0/2 left sentinel lymph nodes were involved with carcinoma.  The right breast was negative. An OncotypeDX returned with RS 14 = 9% risk of mets at 10 years on tamoxifen alone.  She started tamoxifen 2/25/13. [de-identified] : She remains with a good appetite, stable weight and performance status.\par \par In the interim she had called with concern re 'rash' but when I saw the picture it was not cellulitis  She said this resolved spontaneously \par \par Has had chronic discomfort from breast implants and saw Dr Levy and considering exchange vs no implants in the future post COVID. She favors the latter.  \par \par Menstrual periods have been irregular; last 4 mo ago.

## 2020-03-31 NOTE — REVIEW OF SYSTEMS
[Negative] : Musculoskeletal [de-identified] : as above [de-identified] : as above [de-identified] : see above

## 2020-04-16 ENCOUNTER — OUTPATIENT (OUTPATIENT)
Dept: OUTPATIENT SERVICES | Facility: HOSPITAL | Age: 50
LOS: 1 days | Discharge: HOME | End: 2020-04-16

## 2020-04-16 ENCOUNTER — APPOINTMENT (OUTPATIENT)
Dept: HEMATOLOGY ONCOLOGY | Facility: CLINIC | Age: 50
End: 2020-04-16
Payer: COMMERCIAL

## 2020-04-16 DIAGNOSIS — C50.919 MALIGNANT NEOPLASM OF UNSPECIFIED SITE OF UNSPECIFIED FEMALE BREAST: ICD-10-CM

## 2020-04-16 DIAGNOSIS — Z90.13 ACQUIRED ABSENCE OF BILATERAL BREASTS AND NIPPLES: Chronic | ICD-10-CM

## 2020-04-16 PROCEDURE — 99213 OFFICE O/P EST LOW 20 MIN: CPT | Mod: 95

## 2020-04-16 NOTE — HISTORY OF PRESENT ILLNESS
[Home] : at home, [unfilled] , at the time of the visit. [Other Location: e.g. Home (Enter Location, City,State)___] : at [unfilled] [Patient] : the patient [Self] : self [Disease: _____________________] : Disease: [unfilled] [T: ___] : T[unfilled] [N: ___] : N[unfilled] [M: ___] : M[unfilled] [AJCC Stage: ____] : AJCC Stage: [unfilled] [FreeTextEntry2] : Lorena Nunez [de-identified] : Pt reported first noting a left breast superficial nodule in the upper outer quadrant, "BB size", which was "hard and circular".  She keept it to herself initially but approximately six weeks later noted that it had increased in size and that there were other palpable areas of nodularity beneath her nipple - areolar complex.  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  In light of the above, the patient went on to have an MRI of her breasts with the finding in the left breast of extensive suspicious enhancement with multicentric disease suspected, with a note of a 6 mm palpable superficial subdermal suspicious nodule noted in the left breast upper outer quadrant, with no evelyne dermal enhancement noted; the right breast had evidence of a suspicious 1.5 cm enhancing mass at the posterior lower slightly inner right breast for which biopsy was recommended.  After considering the above, the patient ultimately decided on having a bilateral modified radical mastectomy with an expander placement which was performed under the care of Dr. Timothy Mcclain at [facility] on 01/29/2013 with the finding in the left breast of invasive ductal carcinoma and ductal carcinoma in situ, multicentric, specifically in the 11 o'clock axis  a well differentiated 0.4 cm invasive ductal carcinoma, in the 2 o'clock axis invasive ductal carcinoma, moderately differentiated measuring 1.2 cm in greatest diameter.  In the right breast there was a atypical lobular hyperplasia with focal fibrocystic changes and microcalcifications, but no invasive carcinoma. The invasive carcinoma in the left breast was ER positive, DC positive, HER2 dirk negative; 0/2 left sentinel lymph nodes were involved with carcinoma.  The right breast was negative. An OncotypeDX returned with RS 14 = 9% risk of mets at 10 years on tamoxifen alone.  She started tamoxifen 2/25/13. [de-identified] : Pt was seen for an urgent virtual / telehealth visit secondary to the Covid crisis. \par \par She called earlier today and sent pictures of her B/L breast implants. She was concerned that for the last few weeks has had increasing change sto the shape/ positioning of her right breast implant. Denies any erythema, tenderness, trauma.  \par \par In 2/20 she saw Dr Levy who noted subpectoral implants in place with animation deformity and inadequate inferior pole expansion and discussed options going forward. The pt reports the changes have increased in the interim. \par \par Menstrual periods have been irregular

## 2020-04-16 NOTE — REVIEW OF SYSTEMS
[Negative] : Constitutional [de-identified] : as above [de-identified] : as above [de-identified] : see above

## 2020-04-16 NOTE — PHYSICAL EXAM
[Fully active, able to carry on all pre-disease performance without restriction] : Status 0 - Fully active, able to carry on all pre-disease performance without restriction [Normal] : well developed, well nourished, in no acute distress [de-identified] : pictures were reviewed and the right breast seemed less pronouced than th eleft and seemed to have become somewhat mishapen toward the lower outer pole of the right implants with more of a crease apparent

## 2020-04-20 ENCOUNTER — APPOINTMENT (OUTPATIENT)
Dept: PLASTIC SURGERY | Facility: CLINIC | Age: 50
End: 2020-04-20

## 2020-04-22 ENCOUNTER — APPOINTMENT (OUTPATIENT)
Dept: HEMATOLOGY ONCOLOGY | Facility: CLINIC | Age: 50
End: 2020-04-22

## 2020-05-06 ENCOUNTER — APPOINTMENT (OUTPATIENT)
Dept: HEMATOLOGY ONCOLOGY | Facility: CLINIC | Age: 50
End: 2020-05-06

## 2020-06-16 ENCOUNTER — OUTPATIENT (OUTPATIENT)
Dept: OUTPATIENT SERVICES | Facility: HOSPITAL | Age: 50
LOS: 1 days | End: 2020-06-16
Payer: COMMERCIAL

## 2020-06-16 ENCOUNTER — RESULT REVIEW (OUTPATIENT)
Age: 50
End: 2020-06-16

## 2020-06-16 ENCOUNTER — APPOINTMENT (OUTPATIENT)
Dept: MRI IMAGING | Facility: CLINIC | Age: 50
End: 2020-06-16
Payer: COMMERCIAL

## 2020-06-16 DIAGNOSIS — Z00.8 ENCOUNTER FOR OTHER GENERAL EXAMINATION: ICD-10-CM

## 2020-06-16 DIAGNOSIS — Z90.13 ACQUIRED ABSENCE OF BILATERAL BREASTS AND NIPPLES: Chronic | ICD-10-CM

## 2020-06-16 PROCEDURE — A9585: CPT

## 2020-06-16 PROCEDURE — C8908: CPT

## 2020-06-16 PROCEDURE — C8937: CPT

## 2020-06-16 PROCEDURE — 77049 MRI BREAST C-+ W/CAD BI: CPT | Mod: 26

## 2020-06-29 ENCOUNTER — APPOINTMENT (OUTPATIENT)
Dept: PLASTIC SURGERY | Facility: CLINIC | Age: 50
End: 2020-06-29
Payer: COMMERCIAL

## 2020-06-29 PROCEDURE — 99214 OFFICE O/P EST MOD 30 MIN: CPT

## 2020-07-01 ENCOUNTER — APPOINTMENT (OUTPATIENT)
Dept: FAMILY MEDICINE | Facility: CLINIC | Age: 50
End: 2020-07-01

## 2020-07-01 NOTE — ED ADULT NURSE NOTE - CAS DISCH ACCOMP BY
PROCEDURE NOTE - FRACTURE REDUCTION: The patient was found to have a left distal radius fracture requiring closed reduction. Risks and benefits discussed with patient who verbalized understanding and agreement. Hematoma block was applied to left radial fracture site using 10cc of 2% lidocaine. Patient was then placed in finger traps and when adequate analgesia had been obtained was hung in traction with 5lbs counter traction. Fracture was felt to not be progressing with traction and was ultimately viewed with the mini c-arm. She was tolerating manipulation well so she was taken out of traction and fracture was reduced using extension of fracture and traction with counter traction method. Bedside imaging confirmed improved alignment. Sugar tong splint was applied and molded into position of relative wrist flexion. Post-procedure imaging obtained showing improved alignment of left distal radius fracture. Patient tolerated procedure well.     Jossue Mc PA-C  Orthopedic Trauma Service  [address] [facility]
Family

## 2020-07-17 ENCOUNTER — APPOINTMENT (OUTPATIENT)
Dept: PLASTIC SURGERY | Facility: AMBULATORY SURGERY CENTER | Age: 50
End: 2020-07-17

## 2020-09-26 ENCOUNTER — OUTPATIENT (OUTPATIENT)
Dept: OUTPATIENT SERVICES | Facility: HOSPITAL | Age: 50
LOS: 1 days | Discharge: HOME | End: 2020-09-26

## 2020-09-26 DIAGNOSIS — C50.919 MALIGNANT NEOPLASM OF UNSPECIFIED SITE OF UNSPECIFIED FEMALE BREAST: ICD-10-CM

## 2020-09-26 DIAGNOSIS — Z90.13 ACQUIRED ABSENCE OF BILATERAL BREASTS AND NIPPLES: Chronic | ICD-10-CM

## 2020-09-30 ENCOUNTER — APPOINTMENT (OUTPATIENT)
Dept: HEMATOLOGY ONCOLOGY | Facility: CLINIC | Age: 50
End: 2020-09-30
Payer: COMMERCIAL

## 2020-09-30 VITALS
SYSTOLIC BLOOD PRESSURE: 132 MMHG | RESPIRATION RATE: 16 BRPM | WEIGHT: 171.3 LBS | HEART RATE: 91 BPM | TEMPERATURE: 98.1 F | BODY MASS INDEX: 29.4 KG/M2 | OXYGEN SATURATION: 99 % | DIASTOLIC BLOOD PRESSURE: 83 MMHG

## 2020-09-30 PROCEDURE — 99214 OFFICE O/P EST MOD 30 MIN: CPT

## 2020-10-01 NOTE — REVIEW OF SYSTEMS
[Negative] : Psychiatric [FreeTextEntry2] : as above [de-identified] : as above [de-identified] : as above [de-identified] : see above

## 2020-10-01 NOTE — PHYSICAL EXAM
[Fully active, able to carry on all pre-disease performance without restriction] : Status 0 - Fully active, able to carry on all pre-disease performance without restriction [Normal] : grossly intact [de-identified] : s/p bilateral mastectomy with bilateral implants. No palpable skin mass in breast. No palpable axillary lymph nodes

## 2020-10-01 NOTE — HISTORY OF PRESENT ILLNESS
[Disease: _____________________] : Disease: [unfilled] [T: ___] : T[unfilled] [N: ___] : N[unfilled] [M: ___] : M[unfilled] [AJCC Stage: ____] : AJCC Stage: [unfilled] [de-identified] : Pt reported first noting a left breast superficial nodule in the upper outer quadrant, "BB size", which was "hard and circular".  She keept it to herself initially but approximately six weeks later noted that it had increased in size and that there were other palpable areas of nodularity beneath her nipple - areolar complex.  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  In light of the above, the patient went on to have an MRI of her breasts with the finding in the left breast of extensive suspicious enhancement with multicentric disease suspected, with a note of a 6 mm palpable superficial subdermal suspicious nodule noted in the left breast upper outer quadrant, with no evelyne dermal enhancement noted; the right breast had evidence of a suspicious 1.5 cm enhancing mass at the posterior lower slightly inner right breast for which biopsy was recommended.  After considering the above, the patient ultimately decided on having a bilateral modified radical mastectomy with an expander placement which was performed under the care of Dr. Timothy Mcclain at [facility] on 01/29/2013 with the finding in the left breast of invasive ductal carcinoma and ductal carcinoma in situ, multicentric, specifically in the 11 o'clock axis  a well differentiated 0.4 cm invasive ductal carcinoma, in the 2 o'clock axis invasive ductal carcinoma, moderately differentiated measuring 1.2 cm in greatest diameter.  In the right breast there was a atypical lobular hyperplasia with focal fibrocystic changes and microcalcifications, but no invasive carcinoma. The invasive carcinoma in the left breast was ER positive, AK positive, HER2 dirk negative; 0/2 left sentinel lymph nodes were involved with carcinoma.  The right breast was negative. An OncotypeDX returned with RS 14 = 9% risk of mets at 10 years on tamoxifen alone.  She started tamoxifen 2/25/13. [de-identified] : Overall she is doing fine. She reports that her appetite increases because of stress and gained weight. She takes tamoxifen and tolerates well. Denies dizziness/lightheadedness, pain, hearing change, vision change, nausea/vomiting. She has chronic intermittent headache which is stable. \par \par Her last menstrual cycles was in 11/2019

## 2020-10-27 ENCOUNTER — APPOINTMENT (OUTPATIENT)
Dept: PLASTIC SURGERY | Facility: CLINIC | Age: 50
End: 2020-10-27

## 2020-12-21 PROBLEM — Z87.09 HISTORY OF ACUTE BRONCHITIS: Status: RESOLVED | Noted: 2019-03-28 | Resolved: 2020-12-21

## 2021-01-25 ENCOUNTER — APPOINTMENT (OUTPATIENT)
Dept: OBGYN | Facility: CLINIC | Age: 51
End: 2021-01-25
Payer: COMMERCIAL

## 2021-01-25 VITALS
HEART RATE: 89 BPM | TEMPERATURE: 98.3 F | DIASTOLIC BLOOD PRESSURE: 80 MMHG | OXYGEN SATURATION: 98 % | WEIGHT: 164 LBS | HEIGHT: 64 IN | SYSTOLIC BLOOD PRESSURE: 120 MMHG | BODY MASS INDEX: 28 KG/M2

## 2021-01-25 DIAGNOSIS — Z01.419 ENCOUNTER FOR GYNECOLOGICAL EXAMINATION (GENERAL) (ROUTINE) W/OUT ABNORMAL FINDINGS: ICD-10-CM

## 2021-01-25 PROCEDURE — 99396 PREV VISIT EST AGE 40-64: CPT

## 2021-01-25 PROCEDURE — 99072 ADDL SUPL MATRL&STAF TM PHE: CPT

## 2021-01-25 NOTE — PHYSICAL EXAM
[Appropriately responsive] : appropriately responsive [Alert] : alert [No Acute Distress] : no acute distress [Soft] : soft [Non-tender] : non-tender [Non-distended] : non-distended [No Lesions] : no lesions [No HSM] : No HSM [No Mass] : no mass [Oriented x3] : oriented x3 [No Masses] : no breast masses were palpable [Labia Majora] : normal [Labia Minora] : normal [Normal] : normal [Uterine Adnexae] : normal [] : implants [Right Breast Absent] : a total mastectomy [Left Breast Absent] : a total mastectomy

## 2021-01-26 LAB — HPV HIGH+LOW RISK DNA PNL CVX: NOT DETECTED

## 2021-01-28 LAB — CYTOLOGY CVX/VAG DOC THIN PREP: NORMAL

## 2021-02-11 ENCOUNTER — NON-APPOINTMENT (OUTPATIENT)
Age: 51
End: 2021-02-11

## 2021-02-16 ENCOUNTER — EMERGENCY (EMERGENCY)
Facility: HOSPITAL | Age: 51
LOS: 1 days | Discharge: HOME | End: 2021-02-16
Attending: EMERGENCY MEDICINE
Payer: COMMERCIAL

## 2021-02-16 ENCOUNTER — TRANSCRIPTION ENCOUNTER (OUTPATIENT)
Age: 51
End: 2021-02-16

## 2021-02-16 VITALS
DIASTOLIC BLOOD PRESSURE: 85 MMHG | RESPIRATION RATE: 20 BRPM | WEIGHT: 162.04 LBS | HEART RATE: 121 BPM | HEIGHT: 64 IN | SYSTOLIC BLOOD PRESSURE: 147 MMHG | OXYGEN SATURATION: 100 % | TEMPERATURE: 98 F

## 2021-02-16 DIAGNOSIS — Z90.13 ACQUIRED ABSENCE OF BILATERAL BREASTS AND NIPPLES: Chronic | ICD-10-CM

## 2021-02-16 LAB
ALBUMIN SERPL ELPH-MCNC: 4.3 G/DL — SIGNIFICANT CHANGE UP (ref 3.3–5)
ALP SERPL-CCNC: 78 U/L — SIGNIFICANT CHANGE UP (ref 40–120)
ALT FLD-CCNC: 19 U/L — SIGNIFICANT CHANGE UP (ref 10–45)
ANION GAP SERPL CALC-SCNC: 13 MMOL/L — SIGNIFICANT CHANGE UP (ref 5–17)
APTT BLD: 29.3 SEC — SIGNIFICANT CHANGE UP (ref 27.5–35.5)
AST SERPL-CCNC: 28 U/L — SIGNIFICANT CHANGE UP (ref 10–40)
BASOPHILS # BLD AUTO: 0.1 K/UL — SIGNIFICANT CHANGE UP (ref 0–0.2)
BASOPHILS NFR BLD AUTO: 0.8 % — SIGNIFICANT CHANGE UP (ref 0–2)
BILIRUB SERPL-MCNC: 0.2 MG/DL — SIGNIFICANT CHANGE UP (ref 0.2–1.2)
BUN SERPL-MCNC: 12 MG/DL — SIGNIFICANT CHANGE UP (ref 7–23)
CALCIUM SERPL-MCNC: 9.7 MG/DL — SIGNIFICANT CHANGE UP (ref 8.4–10.5)
CHLORIDE SERPL-SCNC: 99 MMOL/L — SIGNIFICANT CHANGE UP (ref 96–108)
CO2 SERPL-SCNC: 25 MMOL/L — SIGNIFICANT CHANGE UP (ref 22–31)
CREAT SERPL-MCNC: 0.7 MG/DL — SIGNIFICANT CHANGE UP (ref 0.5–1.3)
EOSINOPHIL # BLD AUTO: 0.18 K/UL — SIGNIFICANT CHANGE UP (ref 0–0.5)
EOSINOPHIL NFR BLD AUTO: 1.5 % — SIGNIFICANT CHANGE UP (ref 0–6)
GLUCOSE SERPL-MCNC: 98 MG/DL — SIGNIFICANT CHANGE UP (ref 70–99)
HCG SERPL-ACNC: <2 MIU/ML — SIGNIFICANT CHANGE UP
HCT VFR BLD CALC: 41.3 % — SIGNIFICANT CHANGE UP (ref 34.5–45)
HGB BLD-MCNC: 14.6 G/DL — SIGNIFICANT CHANGE UP (ref 11.5–15.5)
IMM GRANULOCYTES NFR BLD AUTO: 0.3 % — SIGNIFICANT CHANGE UP (ref 0–1.5)
INR BLD: 1.01 RATIO — SIGNIFICANT CHANGE UP (ref 0.88–1.16)
LYMPHOCYTES # BLD AUTO: 34.2 % — SIGNIFICANT CHANGE UP (ref 13–44)
LYMPHOCYTES # BLD AUTO: 4.09 K/UL — HIGH (ref 1–3.3)
MCHC RBC-ENTMCNC: 32.4 PG — SIGNIFICANT CHANGE UP (ref 27–34)
MCHC RBC-ENTMCNC: 35.4 GM/DL — SIGNIFICANT CHANGE UP (ref 32–36)
MCV RBC AUTO: 91.8 FL — SIGNIFICANT CHANGE UP (ref 80–100)
MONOCYTES # BLD AUTO: 0.99 K/UL — HIGH (ref 0–0.9)
MONOCYTES NFR BLD AUTO: 8.3 % — SIGNIFICANT CHANGE UP (ref 2–14)
NEUTROPHILS # BLD AUTO: 6.57 K/UL — SIGNIFICANT CHANGE UP (ref 1.8–7.4)
NEUTROPHILS NFR BLD AUTO: 54.9 % — SIGNIFICANT CHANGE UP (ref 43–77)
NRBC # BLD: 0 /100 WBCS — SIGNIFICANT CHANGE UP (ref 0–0)
PLATELET # BLD AUTO: 223 K/UL — SIGNIFICANT CHANGE UP (ref 150–400)
POTASSIUM SERPL-MCNC: 4.4 MMOL/L — SIGNIFICANT CHANGE UP (ref 3.5–5.3)
POTASSIUM SERPL-SCNC: 4.4 MMOL/L — SIGNIFICANT CHANGE UP (ref 3.5–5.3)
PROT SERPL-MCNC: 7.3 G/DL — SIGNIFICANT CHANGE UP (ref 6–8.3)
PROTHROM AB SERPL-ACNC: 12.1 SEC — SIGNIFICANT CHANGE UP (ref 10.6–13.6)
RBC # BLD: 4.5 M/UL — SIGNIFICANT CHANGE UP (ref 3.8–5.2)
RBC # FLD: 12.1 % — SIGNIFICANT CHANGE UP (ref 10.3–14.5)
SODIUM SERPL-SCNC: 137 MMOL/L — SIGNIFICANT CHANGE UP (ref 135–145)
WBC # BLD: 11.96 K/UL — HIGH (ref 3.8–10.5)
WBC # FLD AUTO: 11.96 K/UL — HIGH (ref 3.8–10.5)

## 2021-02-16 PROCEDURE — 99285 EMERGENCY DEPT VISIT HI MDM: CPT

## 2021-02-16 NOTE — ED PROVIDER NOTE - OBJECTIVE STATEMENT
51y F PMHx breast cancer with bilateral mastectomy p/w SOB. Pt tested COVID + 10 days ago (symptoms began about 14 days ago) with symptoms of SOB, HA, nausea, cough, decreased appetite and fatigue. pt reports 2 days of worsening SOB at rest with palpitations. Has been on Tamoxifen until 10 days ago. No prior DVT/PE. +swelling behind left calf which pt reports is new this week. Denies CP, pain with inspiration, hemoptysis, dizziness, LOC, numbness/weakness.

## 2021-02-16 NOTE — ED PROVIDER NOTE - RATE
Myalgia Treatment: I explained this is common when taking isotretinoin. If this worsens they will contact us. They may try OTC ibuprofen. 91

## 2021-02-16 NOTE — ED ADULT NURSE NOTE - OBJECTIVE STATEMENT
Pt is a 51 yr old female with pmh of breast CA with bilateral mastectomy sent in by oncologist for worsening covid symptoms. Pt was diagnosed with covid two weeks ago- and over the last few days has had increased headache, occasional dizziness while sitting down, chills, feeling weak, and nausea but no vomiting. Pt states she has had little appetite but has been able to drink plenty of fluids. Pt vitals within normal limits- previous tachycardia from anxiety. Pt has had no fevers over the course of her diagnosis. Pt ambulatory saturation was 100% on RA- after ambulating O2 dropped to mid-90's but came right back up to 99%. Pt a/ox 3- slightly anxious but cooperative. Pt is a 51 yr old female with pmh of breast CA with bilateral mastectomy sent in by oncologist for worsening covid symptoms. Pt was diagnosed with covid two weeks ago- and over the last few days has had increased headache, occasional dizziness while sitting down, chills, feeling weak, and nausea but no vomiting. Pt states she has had little appetite but has been able to drink plenty of fluids. Pt also has a lump in the back of her right knee that started a few days ago.  Pt vitals within normal limits- previous tachycardia from anxiety. Pt has had no fevers over the course of her diagnosis. Pt ambulatory saturation was 100% on RA- after ambulating O2 dropped to mid-90's but came right back up to 99%. Pt a/ox 3- slightly anxious but cooperative. Pt is a 51 yr old female with pmh of breast CA with bilateral mastectomy sent in by oncologist for worsening covid symptoms. Pt was diagnosed with covid two weeks ago- and over the last few days has had increased headache, occasional dizziness while sitting down, chills, feeling weak, and nausea but no vomiting. Pt states she has had little appetite but has been able to drink plenty of fluids. Pt also has a lump in the back of her left knee that started a few days ago.  Pt vitals within normal limits- previous tachycardia from anxiety. Pt has had no fevers over the course of her diagnosis. Pt ambulatory saturation was 100% on RA- after ambulating O2 dropped to mid-90's but came right back up to 99%. Pt a/ox 3- slightly anxious but cooperative.

## 2021-02-16 NOTE — ED PROVIDER NOTE - MUSCULOSKELETAL MINIMAL EXAM
swelling in left popliteal space, nontender. calves equal in size, nontender/normal range of motion/no muscle tenderness

## 2021-02-16 NOTE — ED ADULT NURSE NOTE - NSIMPLEMENTINTERV_GEN_ALL_ED
Implemented All Universal Safety Interventions:  [place] to call system. Call bell, personal items and telephone within reach. Instruct patient to call for assistance. Room bathroom lighting operational. Non-slip footwear when patient is off stretcher. Physically safe environment: no spills, clutter or unnecessary equipment. Stretcher in lowest position, wheels locked, appropriate side rails in place.

## 2021-02-16 NOTE — ED PROVIDER NOTE - PROGRESS NOTE DETAILS
Sign out follow-up: Called from [facility] as patient asking to leave AMA before CTA results, however, now resulted. Zuhair callahan/ KEY.

## 2021-02-16 NOTE — ED PROVIDER NOTE - CLINICAL SUMMARY MEDICAL DECISION MAKING FREE TEXT BOX
51y F PMHx breast cancer with bilateral mastectomy p/w SOB. Pt tested COVID + 10 days ago (symptoms began about 14 days ago) with symptoms of SOB, HA, nausea, cough, decreased appetite and fatigue. pt reports 2 days of worsening SOB at rest with palpitations. Has been on Tamoxifen until 10 days ago. vitals stable with ambulatory and resting sPO2 95%, tachycardic in triage 120s. +scattered rhonchi RUL. left popliteal swelling with equal calf size. ddx SOB 2/2 COVID-19 infxn vs focal PNA vs PTX vs PE. cannot PERC out. will get labs, CTA chest to r/o PE - Phil Munoz PA-C 51y F PMHx breast cancer with bilateral mastectomy p/w SOB. Pt tested COVID + 10 days ago (symptoms began about 14 days ago) with symptoms of SOB, HA, nausea, cough, decreased appetite and fatigue. pt reports 2 days of worsening SOB at rest with palpitations. Has been on Tamoxifen until 10 days ago. vitals stable with ambulatory and resting sPO2 95%, tachycardic in triage 120s. +scattered rhonchi RUL. left popliteal swelling with equal calf size. ddx SOB 2/2 COVID-19 infxn vs focal PNA vs PTX vs PE. cannot PERC out. will get labs, CTA chest to r/o PE - NOHELIA Starks MD - Attending Physician: Pt here with SOB, myalgias, HA, nausea all consistent with known Dx of COVID19. No hypoxia, no CP, no pleuritic pain. Low concern for PE but here with concerns for LE swelling and noted to be tachy on arrival - cannot PERC, +CA as well as COVID so not low risk. CTA for eval for PE

## 2021-02-16 NOTE — ED PROVIDER NOTE - NSFOLLOWUPINSTRUCTIONS_ED_ALL_ED_FT
YOU HAVE BEEN TESTED FOR COVID-19. YOU MUST REMAIN ISOLATED AT HOME FOR 14 DAYS MINIMUM UNTIL TOLD OTHERWISE TO PREVENT FURTHER SPREAD OF THIS DISEASE. ANYONE WHO LIVES WITH YOU MUST ALSO SELF ISOLATE.   PLEASE READ ATTACHED MATERIAL. AVOID ELDERLY AND IMMUNOCOMPROMISED POPULATION. WASH YOUR HANDS, AVOID TOUCHING YOUR FACE, AND SNEEZE/COUGH INTO THE CROOK OF YOUR ARM.     If you wish to be tested for COVID19 you may call the [facility] COVID19 Hotline: 1-155.197.7856    For fever/body aches:  Take Tylenol 650mg every 6 hrs as needed for pain.  Take motrin 400-600mg every 6-8hours as needed. TAKE WITH FOOD.    Stay well hydrated with water and electrolyte replacement solutions such as Pedialyte or the adult equivalent.     Return to the ED for worsening symptoms, shortness of breath, chest pain, inability to tolerate food/drink, loss of consciousness, or any other serious concerns.    -------------    What is a coronavirus?  Coronaviruses are a large family of viruses that cause illnesses ranging from the common cold to more severe diseases such as Middle East Respiratory Syndrome (MERS) and Severe Acute Respiratory Syndrome (SARS).    What is Novel Coronavirus (COVID-19)?  The Centers for Disease Control and Prevention (CDC) is closely monitoring the outbreak caused by COVID-19. For the latest information about COVID-19, visit the CDC website at CDC.gov/Coronavirus    How are coronaviruses spread?  Coronaviruses can be transmitted from person to person, usually after close contact with an infected  person (for example, in a household, workplace, or healthcare setting), via droplets that become airborne after a cough or sneeze. These droplets can then infect a nearby person. Transmission can also occur by touching recently contaminated surfaces.    Is there a treatment for a COVID-19?  There is no specific treatment for disease caused by COVID-19. However, many of the symptoms can be treated based on the patient’s clinical condition. Supportive care for infected persons can be highly effective.    What are the symptoms of coronavirus infection?  It depends on the virus, but common signs include fever and/or respiratory symptoms such as cough and shortness of breath. In more severe cases, infection can cause pneumonia, severe acute respiratory syndrome, kidney failure and even death. Fortunately, most cases of COVID-19 have an illness no different than the influenza (flu), with a majority of these patients having mild symptoms and overall mortality which appears to be not much different than the flu.    What can I do to protect myself?  The best precautionary measures:  – washing your hands  – covering your cough  – disinfecting surfaces  – it is also advisable to avoid close contact with anyone showing symptoms of respiratory illness such as coughing and sneezing  – those with symptoms should wear a surgical mask when around others    What can I do to protect those around me?  If you have been identified as someone who may be infected with COVID-19, we recommend you follow the self-isolation procedures outlined on the following page to protect those around you and to limit the spread of this virus.    We recommend the below precautionary steps from now until 14 days from when you returned from your travel or date of your last known possible contact:    — Do not go to work, school or public areas. Avoid using public transportation, ridesharing or taxis.  — As much as possible, separate yourself from other people in your home. If you can, you should stay in a room and away from other people. Also, you should use a separate bathroom if available.  — Wear the supplied mask whenever you are around other people.  — If you have a non-urgent medical appointment, please reschedule for a later date. If the appointment is urgent, please call the health care provider and tell them that you are on self-isolation for possible COVID-19. This will help the health care provider’s office take steps to keep other people from getting infected or exposed. If you can reschedule routine appointments, do so.  — Wash your hands often with soap and water for at least 15 to 20 seconds or clean your hands with an alcohol-based hand  that contains 60 to 95% alcohol, covering all surfaces of your hands and rubbing them together until they feel dry. Soap and water should be used preferentially if hands are visibly dirty.  — Cover your mouth and nose with a tissue when you cough or sneeze. Throw used tissues in a lined trash can. Immediately wash your hands.  — Avoid touching your eyes, nose, and mouth with your hands.  — Avoid sharing personal household items. You should not share dishes, drinking glasses, cups, eating utensils, towels, or bedding with other people or pets in your home. After using these items, they should be washed thoroughly with soap and water.  — Clean and disinfect all “high-touch” surfaces every day. High touch surfaces include counters, tabletops, doorknobs, light switches, remote controls, bathroom fixtures, toilets, phones, keyboards, tablets, and bedside tables. Also, clean any surfaces that may have blood, stool, or body fluids on them.

## 2021-02-16 NOTE — ED PROVIDER NOTE - PATIENT PORTAL LINK FT
You can access the FollowMyHealth Patient Portal offered by [facility] by registering at the following website: [email or web address]. By joining [employer]’s FollowMyHealth portal, you will also be able to view your health information using other applications (apps) compatible with our system.

## 2021-02-17 VITALS
SYSTOLIC BLOOD PRESSURE: 127 MMHG | HEART RATE: 77 BPM | DIASTOLIC BLOOD PRESSURE: 68 MMHG | RESPIRATION RATE: 18 BRPM | TEMPERATURE: 99 F | OXYGEN SATURATION: 98 %

## 2021-02-17 PROCEDURE — 80053 COMPREHEN METABOLIC PANEL: CPT

## 2021-02-17 PROCEDURE — 84702 CHORIONIC GONADOTROPIN TEST: CPT

## 2021-02-17 PROCEDURE — 99284 EMERGENCY DEPT VISIT MOD MDM: CPT | Mod: 25

## 2021-02-17 PROCEDURE — 85730 THROMBOPLASTIN TIME PARTIAL: CPT

## 2021-02-17 PROCEDURE — 85025 COMPLETE CBC W/AUTO DIFF WBC: CPT

## 2021-02-17 PROCEDURE — 71275 CT ANGIOGRAPHY CHEST: CPT

## 2021-02-17 PROCEDURE — 71275 CT ANGIOGRAPHY CHEST: CPT | Mod: 26,MA

## 2021-02-17 PROCEDURE — 85610 PROTHROMBIN TIME: CPT

## 2021-02-17 NOTE — ED ADULT NURSE REASSESSMENT NOTE - NS ED NURSE REASSESS COMMENT FT1
Pt received CTA and now wants to leave A prior to receiving results. Pt was educated on the possible outcome of the CT results and understands that she is leaving without definitive results. Pt vitals within normal limits and patient is feeling slightly better. MD to see her prior to leaving to sign paperwork Pt received CTA and now wants to leave A prior to receiving results. Pt was educated on the possible outcome of the CT results and understands that she is leaving without definitive results. Pt vitals within normal limits and patient is feeling slightly better. MD [place] to see her prior to leaving to sign paperwork

## 2021-02-25 ENCOUNTER — APPOINTMENT (OUTPATIENT)
Dept: FAMILY MEDICINE | Facility: CLINIC | Age: 51
End: 2021-02-25
Payer: COMMERCIAL

## 2021-02-25 DIAGNOSIS — U07.1 COVID-19: ICD-10-CM

## 2021-02-25 PROCEDURE — 99213 OFFICE O/P EST LOW 20 MIN: CPT | Mod: 95

## 2021-02-25 NOTE — HISTORY OF PRESENT ILLNESS
[Home] : at home, [unfilled] , at the time of the visit. [Medical Office: ([facility])___] : at the medical office located in  [Verbal consent obtained from patient] : the patient, [unfilled] [FreeTextEntry8] : cc-- covid f/u\par \par 50 yo F with COVID-19 was dx on 2/3. Sx started 1/31-- swollen glands with dysphagia, cough, headache, fatigue. She did not have SOB but on day 13, felt like she was gasping for air. Went to ER, had CTA chest-- no PE, lungs were clear. It has been 3 weeks now, still has persistent cough, mostly dry. Does get some phlegm out occasionally. Has not taken any medicine. Was prescribed albuterol but did not take it. Feels like glands still swollen but much better. No sore throat.

## 2021-02-25 NOTE — PHYSICAL EXAM
[No Acute Distress] : no acute distress [Well-Appearing] : well-appearing [Normal] : affect was normal and insight and judgment were intact [de-identified] : frequent coughing

## 2021-02-25 NOTE — REVIEW OF SYSTEMS
[Fever] : no fever [Fatigue] : fatigue [Nasal Discharge] : no nasal discharge [Sore Throat] : no sore throat [Postnasal Drip] : no postnasal drip [Shortness Of Breath] : no shortness of breath [Cough] : cough [Dyspnea on Exertion] : no dyspnea on exertion [Headache] : headache [Negative] : Heme/Lymph

## 2021-03-02 ENCOUNTER — APPOINTMENT (OUTPATIENT)
Dept: FAMILY MEDICINE | Facility: CLINIC | Age: 51
End: 2021-03-02

## 2021-03-19 ENCOUNTER — TRANSCRIPTION ENCOUNTER (OUTPATIENT)
Age: 51
End: 2021-03-19

## 2021-03-19 ENCOUNTER — OUTPATIENT (OUTPATIENT)
Dept: OUTPATIENT SERVICES | Facility: HOSPITAL | Age: 51
LOS: 1 days | Discharge: HOME | End: 2021-03-19

## 2021-03-19 DIAGNOSIS — Z90.13 ACQUIRED ABSENCE OF BILATERAL BREASTS AND NIPPLES: Chronic | ICD-10-CM

## 2021-03-19 DIAGNOSIS — C50.919 MALIGNANT NEOPLASM OF UNSPECIFIED SITE OF UNSPECIFIED FEMALE BREAST: ICD-10-CM

## 2021-03-23 ENCOUNTER — APPOINTMENT (OUTPATIENT)
Dept: FAMILY MEDICINE | Facility: CLINIC | Age: 51
End: 2021-03-23

## 2021-03-24 ENCOUNTER — APPOINTMENT (OUTPATIENT)
Dept: HEMATOLOGY ONCOLOGY | Facility: CLINIC | Age: 51
End: 2021-03-24
Payer: COMMERCIAL

## 2021-03-24 PROCEDURE — 99213 OFFICE O/P EST LOW 20 MIN: CPT | Mod: 95

## 2021-03-24 RX ORDER — ALBUTEROL SULFATE 90 UG/1
108 (90 BASE) INHALANT RESPIRATORY (INHALATION)
Qty: 8 | Refills: 0 | Status: DISCONTINUED | COMMUNITY
Start: 2021-02-11

## 2021-03-24 RX ORDER — BENZONATATE 100 MG/1
100 CAPSULE ORAL
Qty: 15 | Refills: 0 | Status: DISCONTINUED | COMMUNITY
Start: 2021-03-19

## 2021-03-24 RX ORDER — METHYLPREDNISOLONE 4 MG/1
4 TABLET ORAL
Qty: 1 | Refills: 0 | Status: DISCONTINUED | COMMUNITY
Start: 2021-02-25 | End: 2021-03-24

## 2021-03-24 RX ORDER — LORAZEPAM 0.5 MG/1
0.5 TABLET ORAL
Qty: 20 | Refills: 0 | Status: ACTIVE | COMMUNITY
Start: 2020-10-06

## 2021-03-25 NOTE — PHYSICAL EXAM
[Fully active, able to carry on all pre-disease performance without restriction] : Status 0 - Fully active, able to carry on all pre-disease performance without restriction [Normal] : affect appropriate [de-identified] : sclerae anicteric [de-identified] : no labored breathing

## 2021-03-25 NOTE — REVIEW OF SYSTEMS
[Negative] : Psychiatric [FreeTextEntry2] : as above [FreeTextEntry6] : as above [de-identified] : as above

## 2021-03-25 NOTE — HISTORY OF PRESENT ILLNESS
[Home] : at home, [unfilled] , at the time of the visit. [Medical Office: ([facility])___] : at the medical office located in  [Verbal consent obtained from patient] : the patient, [unfilled] [Disease: _____________________] : Disease: [unfilled] [T: ___] : T[unfilled] [N: ___] : N[unfilled] [M: ___] : M[unfilled] [AJCC Stage: ____] : AJCC Stage: [unfilled] [de-identified] : Pt reported first noting a left breast superficial nodule in the upper outer quadrant, "BB size", which was "hard and circular".  She keept it to herself initially but approximately six weeks later noted that it had increased in size and that there were other palpable areas of nodularity beneath her nipple - areolar complex.  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  In light of the above, the patient went on to have an MRI of her breasts with the finding in the left breast of extensive suspicious enhancement with multicentric disease suspected, with a note of a 6 mm palpable superficial subdermal suspicious nodule noted in the left breast upper outer quadrant, with no evelyne dermal enhancement noted; the right breast had evidence of a suspicious 1.5 cm enhancing mass at the posterior lower slightly inner right breast for which biopsy was recommended.  After considering the above, the patient ultimately decided on having a bilateral modified radical mastectomy with an expander placement which was performed under the care of Dr. Timothy Mcclain at [facility] on 01/29/2013 with the finding in the left breast of invasive ductal carcinoma and ductal carcinoma in situ, multicentric, specifically in the 11 o'clock axis  a well differentiated 0.4 cm invasive ductal carcinoma, in the 2 o'clock axis invasive ductal carcinoma, moderately differentiated measuring 1.2 cm in greatest diameter.  In the right breast there was a atypical lobular hyperplasia with focal fibrocystic changes and microcalcifications, but no invasive carcinoma. The invasive carcinoma in the left breast was ER positive, MS positive, HER2 dirk negative; 0/2 left sentinel lymph nodes were involved with carcinoma.  The right breast was negative. An OncotypeDX returned with RS 14 = 9% risk of mets at 10 years on tamoxifen alone.  She started tamoxifen 2/25/13. [de-identified] : Had Covid 19 - severe fatigue, generalized weakness, HAs, dry cough, "swollen glands" - these gradually resolved over the last month except for decreased but residual dry cough. "Last 2 days I feel almost 100%" \par \par She discontinued tamoxifen end 1/21 as previously recommended. \par \par Notes a good appetite, stable weight and excellent performance status.

## 2021-04-08 ENCOUNTER — APPOINTMENT (OUTPATIENT)
Dept: FAMILY MEDICINE | Facility: CLINIC | Age: 51
End: 2021-04-08
Payer: COMMERCIAL

## 2021-04-08 DIAGNOSIS — K52.9 NONINFECTIVE GASTROENTERITIS AND COLITIS, UNSPECIFIED: ICD-10-CM

## 2021-04-08 PROCEDURE — 99213 OFFICE O/P EST LOW 20 MIN: CPT | Mod: 95

## 2021-04-08 NOTE — HISTORY OF PRESENT ILLNESS
[Home] : at home, [unfilled] , at the time of the visit. [Medical Office: ([facility])___] : at the medical office located in  [Verbal consent obtained from patient] : the patient, [unfilled] [FreeTextEntry8] : cc-- vomiting, diarrhea\par \par 52 yo F had COVID 2 months ago. Starting last week,  and her ate same thing. Suddenly, had violent retching and vomiting to point of bile. She denies any blood. She is also having epigastric discomfort and diarrhea. No appetite what so ever. For past 2 days, she has been able to tolerate soup and potatoes. She is also able to keep food down now. She has been drinking tha tea which has helped with her nausea. She will be following up with her GI if this persists. No fever, no bodyaches, cough, congestion. Just some chills, vomiting and diarrhea.

## 2021-05-18 ENCOUNTER — RX RENEWAL (OUTPATIENT)
Age: 51
End: 2021-05-18

## 2021-09-18 ENCOUNTER — OUTPATIENT (OUTPATIENT)
Dept: OUTPATIENT SERVICES | Facility: HOSPITAL | Age: 51
LOS: 1 days | Discharge: HOME | End: 2021-09-18

## 2021-09-18 DIAGNOSIS — Z90.13 ACQUIRED ABSENCE OF BILATERAL BREASTS AND NIPPLES: Chronic | ICD-10-CM

## 2021-09-18 DIAGNOSIS — C50.919 MALIGNANT NEOPLASM OF UNSPECIFIED SITE OF UNSPECIFIED FEMALE BREAST: ICD-10-CM

## 2021-09-20 ENCOUNTER — APPOINTMENT (OUTPATIENT)
Dept: OBGYN | Facility: CLINIC | Age: 51
End: 2021-09-20
Payer: COMMERCIAL

## 2021-09-20 ENCOUNTER — ASOB RESULT (OUTPATIENT)
Age: 51
End: 2021-09-20

## 2021-09-20 PROCEDURE — 76830 TRANSVAGINAL US NON-OB: CPT

## 2021-09-22 ENCOUNTER — NON-APPOINTMENT (OUTPATIENT)
Age: 51
End: 2021-09-22

## 2021-09-22 ENCOUNTER — APPOINTMENT (OUTPATIENT)
Dept: OBGYN | Facility: CLINIC | Age: 51
End: 2021-09-22
Payer: COMMERCIAL

## 2021-09-22 VITALS
HEIGHT: 64 IN | WEIGHT: 153 LBS | HEART RATE: 94 BPM | DIASTOLIC BLOOD PRESSURE: 75 MMHG | BODY MASS INDEX: 26.12 KG/M2 | TEMPERATURE: 96.5 F | SYSTOLIC BLOOD PRESSURE: 121 MMHG

## 2021-09-22 DIAGNOSIS — N95.9 UNSPECIFIED MENOPAUSAL AND PERIMENOPAUSAL DISORDER: ICD-10-CM

## 2021-09-22 DIAGNOSIS — N83.201 UNSPECIFIED OVARIAN CYST, RIGHT SIDE: ICD-10-CM

## 2021-09-22 LAB
HCG UR QL: NEGATIVE
QUALITY CONTROL: YES

## 2021-09-22 PROCEDURE — 99214 OFFICE O/P EST MOD 30 MIN: CPT | Mod: 25

## 2021-09-22 PROCEDURE — 58100 BIOPSY OF UTERUS LINING: CPT

## 2021-09-22 NOTE — PROCEDURE
[Endometrial Biopsy] : Endometrial biopsy [Time out performed] : Pre-procedure time out performed.  Patient's name, date of birth and procedure confirmed. [Consent Obtained] : Consent obtained [Risks] : risks [Benefits] : benefits [Alternatives] : alternatives [Patient] : patient [Infection] : infection [Bleeding] : bleeding [Allergic Reaction] : allergic reaction [Uterine Perforation] : uterine perforation [Pain] : pain [Negative] : negative pregnancy test [Betadine] : Betadine [None] : none [Anteverted] : anteverted [Sent to Pathology] : placed in buffered formalin and sent for pathology [Tolerated Well] : Patient tolerated the procedure well [No Complications] : No complications [Scant] : scant [de-identified] : KIERA [de-identified] : allis

## 2021-09-23 ENCOUNTER — NON-APPOINTMENT (OUTPATIENT)
Age: 51
End: 2021-09-23

## 2021-09-30 ENCOUNTER — NON-APPOINTMENT (OUTPATIENT)
Age: 51
End: 2021-09-30

## 2021-09-30 LAB — CORE LAB BIOPSY: NORMAL

## 2021-11-03 ENCOUNTER — APPOINTMENT (OUTPATIENT)
Dept: INTERNAL MEDICINE | Facility: CLINIC | Age: 51
End: 2021-11-03

## 2021-11-12 ENCOUNTER — OUTPATIENT (OUTPATIENT)
Dept: OUTPATIENT SERVICES | Facility: HOSPITAL | Age: 51
LOS: 1 days | Discharge: HOME | End: 2021-11-12

## 2021-11-12 DIAGNOSIS — Z90.13 ACQUIRED ABSENCE OF BILATERAL BREASTS AND NIPPLES: Chronic | ICD-10-CM

## 2021-11-12 DIAGNOSIS — C50.919 MALIGNANT NEOPLASM OF UNSPECIFIED SITE OF UNSPECIFIED FEMALE BREAST: ICD-10-CM

## 2021-11-16 ENCOUNTER — APPOINTMENT (OUTPATIENT)
Dept: HEMATOLOGY ONCOLOGY | Facility: CLINIC | Age: 51
End: 2021-11-16
Payer: COMMERCIAL

## 2021-11-16 VITALS
BODY MASS INDEX: 26.72 KG/M2 | WEIGHT: 156.53 LBS | SYSTOLIC BLOOD PRESSURE: 123 MMHG | OXYGEN SATURATION: 98 % | RESPIRATION RATE: 17 BRPM | HEIGHT: 64.02 IN | DIASTOLIC BLOOD PRESSURE: 78 MMHG | HEART RATE: 85 BPM | TEMPERATURE: 97.7 F

## 2021-11-16 PROCEDURE — 99213 OFFICE O/P EST LOW 20 MIN: CPT

## 2021-11-18 NOTE — PHYSICAL EXAM
[Fully active, able to carry on all pre-disease performance without restriction] : Status 0 - Fully active, able to carry on all pre-disease performance without restriction [Normal] : affect appropriate [de-identified] : s/p B/L MRMs with reconstuction - no palp masses. B/L ax neg

## 2021-11-18 NOTE — HISTORY OF PRESENT ILLNESS
[Disease: _____________________] : Disease: [unfilled] [T: ___] : T[unfilled] [N: ___] : N[unfilled] [M: ___] : M[unfilled] [AJCC Stage: ____] : AJCC Stage: [unfilled] [de-identified] : Pt reported first noting a left breast superficial nodule in the upper outer quadrant, "BB size", which was "hard and circular".  She keept it to herself initially but approximately six weeks later noted that it had increased in size and that there were other palpable areas of nodularity beneath her nipple - areolar complex.  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  In light of the above, the patient went on to have an MRI of her breasts with the finding in the left breast of extensive suspicious enhancement with multicentric disease suspected, with a note of a 6 mm palpable superficial subdermal suspicious nodule noted in the left breast upper outer quadrant, with no evelyne dermal enhancement noted; the right breast had evidence of a suspicious 1.5 cm enhancing mass at the posterior lower slightly inner right breast for which biopsy was recommended.  After considering the above, the patient ultimately decided on having a bilateral modified radical mastectomy with an expander placement which was performed under the care of Dr. Timothy Mcclain at [facility] on 01/29/2013 with the finding in the left breast of invasive ductal carcinoma and ductal carcinoma in situ, multicentric, specifically in the 11 o'clock axis  a well differentiated 0.4 cm invasive ductal carcinoma, in the 2 o'clock axis invasive ductal carcinoma, moderately differentiated measuring 1.2 cm in greatest diameter.  In the right breast there was a atypical lobular hyperplasia with focal fibrocystic changes and microcalcifications, but no invasive carcinoma. The invasive carcinoma in the left breast was ER positive, ME positive, HER2 dirk negative; 0/2 left sentinel lymph nodes were involved with carcinoma.  The right breast was negative. An OncotypeDX returned with RS 14 = 9% risk of mets at 10 years on tamoxifen alone.  She started tamoxifen 2/25/13. [de-identified] : She discontinued tamoxifen end 1/21 as previously recommended. \par \par In 9/21 had transvag US with thickening and poss polyp  - endometrial bx with benign endometrium with ciliated metaplasia. She is having D/C hysteroscopy / possible polypectomy\par \par Otherwise notes a good appetite, stable weight and excellent performance status.

## 2021-11-18 NOTE — REVIEW OF SYSTEMS
[Negative] : Psychiatric [FreeTextEntry2] : as above [FreeTextEntry8] : as above [de-identified] : rare HFs [de-identified] : as above

## 2021-12-21 ENCOUNTER — OUTPATIENT (OUTPATIENT)
Dept: OUTPATIENT SERVICES | Facility: HOSPITAL | Age: 51
LOS: 1 days | End: 2021-12-21

## 2021-12-21 VITALS
HEART RATE: 88 BPM | TEMPERATURE: 99 F | WEIGHT: 151.9 LBS | SYSTOLIC BLOOD PRESSURE: 110 MMHG | RESPIRATION RATE: 16 BRPM | HEIGHT: 64 IN | DIASTOLIC BLOOD PRESSURE: 80 MMHG | OXYGEN SATURATION: 99 %

## 2021-12-21 DIAGNOSIS — N93.9 ABNORMAL UTERINE AND VAGINAL BLEEDING, UNSPECIFIED: ICD-10-CM

## 2021-12-21 DIAGNOSIS — N84.0 POLYP OF CORPUS UTERI: ICD-10-CM

## 2021-12-21 DIAGNOSIS — Z90.13 ACQUIRED ABSENCE OF BILATERAL BREASTS AND NIPPLES: Chronic | ICD-10-CM

## 2021-12-21 DIAGNOSIS — F32.9 MAJOR DEPRESSIVE DISORDER, SINGLE EPISODE, UNSPECIFIED: ICD-10-CM

## 2021-12-21 LAB
ANION GAP SERPL CALC-SCNC: 12 MMOL/L — SIGNIFICANT CHANGE UP (ref 7–14)
BUN SERPL-MCNC: 14 MG/DL — SIGNIFICANT CHANGE UP (ref 7–23)
CALCIUM SERPL-MCNC: 9.7 MG/DL — SIGNIFICANT CHANGE UP (ref 8.4–10.5)
CHLORIDE SERPL-SCNC: 99 MMOL/L — SIGNIFICANT CHANGE UP (ref 98–107)
CO2 SERPL-SCNC: 28 MMOL/L — SIGNIFICANT CHANGE UP (ref 22–31)
CREAT SERPL-MCNC: 0.89 MG/DL — SIGNIFICANT CHANGE UP (ref 0.5–1.3)
GLUCOSE SERPL-MCNC: 77 MG/DL — SIGNIFICANT CHANGE UP (ref 70–99)
HCG SERPL-ACNC: <5 MIU/ML — SIGNIFICANT CHANGE UP
HCT VFR BLD CALC: 42.2 % — SIGNIFICANT CHANGE UP (ref 34.5–45)
HGB BLD-MCNC: 14.4 G/DL — SIGNIFICANT CHANGE UP (ref 11.5–15.5)
MCHC RBC-ENTMCNC: 32.4 PG — SIGNIFICANT CHANGE UP (ref 27–34)
MCHC RBC-ENTMCNC: 34.1 GM/DL — SIGNIFICANT CHANGE UP (ref 32–36)
MCV RBC AUTO: 94.8 FL — SIGNIFICANT CHANGE UP (ref 80–100)
NRBC # BLD: 0 /100 WBCS — SIGNIFICANT CHANGE UP
NRBC # FLD: 0 K/UL — SIGNIFICANT CHANGE UP
PLATELET # BLD AUTO: 223 K/UL — SIGNIFICANT CHANGE UP (ref 150–400)
POTASSIUM SERPL-MCNC: 4.4 MMOL/L — SIGNIFICANT CHANGE UP (ref 3.5–5.3)
POTASSIUM SERPL-SCNC: 4.4 MMOL/L — SIGNIFICANT CHANGE UP (ref 3.5–5.3)
RBC # BLD: 4.45 M/UL — SIGNIFICANT CHANGE UP (ref 3.8–5.2)
RBC # FLD: 12.6 % — SIGNIFICANT CHANGE UP (ref 10.3–14.5)
SODIUM SERPL-SCNC: 139 MMOL/L — SIGNIFICANT CHANGE UP (ref 135–145)
WBC # BLD: 9.05 K/UL — SIGNIFICANT CHANGE UP (ref 3.8–10.5)
WBC # FLD AUTO: 9.05 K/UL — SIGNIFICANT CHANGE UP (ref 3.8–10.5)

## 2021-12-21 RX ORDER — SUMATRIPTAN SUCCINATE 4 MG/.5ML
0 INJECTION, SOLUTION SUBCUTANEOUS
Qty: 0 | Refills: 0 | DISCHARGE

## 2021-12-21 NOTE — H&P PST ADULT - HISTORY OF PRESENT ILLNESS
Pt is a 51 yr old female scheduled for D&C Hysteroscopy with Symphion with Dr Jain tentatively 1/4/22 - pt   Patient instructed to contact surgeon's office concerning COVID test prior to surgery  Pt is a 51 yr old female scheduled for D&C Hysteroscopy with Symphion with Dr Jain tentatively 1/4/22 - pt states she had 2-3 days postmenopausal bleeding 8/21 and had evaluation by GYN - sono noted uterine polyp and endometrial thickening - pt denies bleeding or abdominal pain at present - hx b/l mastectomy for breast ca 2012 - Hx bipolar disorder   Patient instructed to contact surgeon's office concerning COVID test prior to surgery

## 2021-12-21 NOTE — H&P PST ADULT - DENTITION
recent dental extraction left upper jaw - pt states opening into sinus - to have permanent bridge placed- Dr Petty aware

## 2021-12-21 NOTE — H&P PST ADULT - NSICDXPASTMEDICALHX_GEN_ALL_CORE_FT
PAST MEDICAL HISTORY:  Anxiety     Breast cancer     Depression Biopolar    Endometrial thickening on ultrasound     Migraine headache     Uterine polyp

## 2021-12-21 NOTE — H&P PST ADULT - ENMT COMMENTS
Hx of left upper jaw tooth extraction with bone decay and unsuccessful bone graft 8/21 - pt denies infection but will need further work - Dr Petty inspected patient jaw and we are requesting Dental note Extraction left upper jaw - pt checked by Dr Petty - pt denies pain or swelling

## 2021-12-21 NOTE — H&P PST ADULT - PROBLEM SELECTOR PLAN 1
Pt scheduled for surgery D&C Hysteroscopy with Symphion with Dr Jain tentatively 1/4/22  and preop instructions including instructions for taking Famotidine on the day of surgery, given verbally and with use of  written materials, and patient confirming understanding of such instructions using  teach back method.  Dental clearance requested as per Dr Jovanny ZUNIGA requested - pt hx breast ca and sleep apnea   Pt does not remember MD who did sleep study - pt told to bring nasal appliance am DOS

## 2021-12-29 ENCOUNTER — APPOINTMENT (OUTPATIENT)
Dept: OBGYN | Facility: CLINIC | Age: 51
End: 2021-12-29
Payer: COMMERCIAL

## 2021-12-29 DIAGNOSIS — R93.89 ABNORMAL FINDINGS ON DIAGNOSTIC IMAGING OF OTHER SPECIFIED BODY STRUCTURES: ICD-10-CM

## 2021-12-29 PROBLEM — N84.0 POLYP OF CORPUS UTERI: Chronic | Status: ACTIVE | Noted: 2021-12-21

## 2021-12-29 PROCEDURE — 99214 OFFICE O/P EST MOD 30 MIN: CPT | Mod: 95

## 2021-12-29 NOTE — REASON FOR VISIT
[Home] : at home, [unfilled] , at the time of the visit. [Medical Office: ([facility])___] : at the medical office located in  [Verbal consent obtained from patient] : the patient, [unfilled] [Follow-Up] : a follow-up evaluation of

## 2022-01-01 ENCOUNTER — APPOINTMENT (OUTPATIENT)
Dept: DISASTER EMERGENCY | Facility: CLINIC | Age: 52
End: 2022-01-01

## 2022-01-14 ENCOUNTER — OUTPATIENT (OUTPATIENT)
Dept: OUTPATIENT SERVICES | Facility: HOSPITAL | Age: 52
LOS: 1 days | End: 2022-01-14

## 2022-01-14 VITALS
SYSTOLIC BLOOD PRESSURE: 130 MMHG | WEIGHT: 151.9 LBS | DIASTOLIC BLOOD PRESSURE: 80 MMHG | RESPIRATION RATE: 16 BRPM | TEMPERATURE: 97 F | HEIGHT: 64 IN | OXYGEN SATURATION: 97 % | HEART RATE: 76 BPM

## 2022-01-14 DIAGNOSIS — N93.9 ABNORMAL UTERINE AND VAGINAL BLEEDING, UNSPECIFIED: ICD-10-CM

## 2022-01-14 DIAGNOSIS — K08.409 PARTIAL LOSS OF TEETH, UNSPECIFIED CAUSE, UNSPECIFIED CLASS: ICD-10-CM

## 2022-01-14 DIAGNOSIS — Z90.13 ACQUIRED ABSENCE OF BILATERAL BREASTS AND NIPPLES: Chronic | ICD-10-CM

## 2022-01-14 DIAGNOSIS — F31.9 BIPOLAR DISORDER, UNSPECIFIED: ICD-10-CM

## 2022-01-14 DIAGNOSIS — T78.40XA ALLERGY, UNSPECIFIED, INITIAL ENCOUNTER: ICD-10-CM

## 2022-01-14 DIAGNOSIS — G47.33 OBSTRUCTIVE SLEEP APNEA (ADULT) (PEDIATRIC): ICD-10-CM

## 2022-01-14 LAB — HCG SERPL-ACNC: <5 MIU/ML — SIGNIFICANT CHANGE UP

## 2022-01-14 RX ORDER — DIVALPROEX SODIUM 500 MG/1
5 TABLET, DELAYED RELEASE ORAL
Qty: 0 | Refills: 0 | DISCHARGE

## 2022-01-14 RX ORDER — DIVALPROEX SODIUM 500 MG/1
0 TABLET, DELAYED RELEASE ORAL
Qty: 0 | Refills: 0 | DISCHARGE

## 2022-01-14 RX ORDER — LAMOTRIGINE 25 MG/1
1 TABLET, ORALLY DISINTEGRATING ORAL
Qty: 0 | Refills: 0 | DISCHARGE

## 2022-01-14 NOTE — H&P PST ADULT - ENMT COMMENTS
Hx of left upper jaw tooth extraction with bone decay and unsuccessful bone graft 8/21 - pt denies infection but will need further work - Dr Petty inspected patient jaw and we are requesting Dental note Extraction left upper jaw - pt checked by Dr Petty - pt denies pain or swelling Hx of left upper jaw tooth extraction with bone decay Extraction left upper jaw - bone graft  pt denies pain or swelling. Pt seen  by anesthesia 12/21 ; previous pst ; dental clearance requested

## 2022-01-14 NOTE — H&P PST ADULT - RS GEN PE MLT RESP DETAILS PC
airway patent/clear to auscultation bilaterally airway patent/good air movement/respirations non-labored/clear to auscultation bilaterally

## 2022-01-14 NOTE — H&P PST ADULT - NSICDXPASTSURGICALHX_GEN_ALL_CORE_FT
PAST SURGICAL HISTORY:  H/O bilateral mastectomy saline implants     PAST SURGICAL HISTORY:  H/O bilateral mastectomy 2012 saline implants

## 2022-01-14 NOTE — H&P PST ADULT - HISTORY OF PRESENT ILLNESS
Pt is a 51 yr old female scheduled for D&C Hysteroscopy with Symphion with Dr Jain tentatively 1/4/22 - pt states she had 2-3 days postmenopausal bleeding 8/21 and had evaluation by GYN - sono noted uterine polyp and endometrial thickening - pt denies bleeding or abdominal pain at present - hx b/l mastectomy for breast ca 2012 - Hx bipolar disorder   Patient instructed to contact surgeon's office concerning COVID test prior to surgery  Pt is a 51 yr old female ; pt states LMP > 1 year ago  - pt states she had 2-3 days postmenopausal bleeding 8/21 and had evaluation by GYN - sono noted uterine polyp and endometrial thickening -Pt now presents for    Dilation and Curettage Hysteroscopy with Resectoscope Symphion     Pt with h/o hx b/l mastectomy ; h/o left breast cancer for breast ca 2012 -    Patient instructed to contact surgeon's office concerning COVID test prior to surgery  Pt is a 51 yr old female ; pt states LMP > 1 year ago  - pt states she had 2-3 days postmenopausal bleeding 8/21 and had evaluation by GYN - sono noted uterine polyp and endometrial thickening -Pt now presents for    Dilation and Curettage Hysteroscopy with Resectoscope Symphion     Pt with h/o hx b/l mastectomy ; h/o left breast cancer for breast ca 2012 -   Pt is a 51 yr old female ; pt states LMP > 1 year ago  - pt states she had 2-3 days postmenopausal bleeding 8/21 and had evaluation by GYN - sono noted uterine polyp and endometrial thickening -Pt now presents for    Dilation and Curettage Hysteroscopy with Resectoscope Symphion     Pt with h/o hx b/l mastectomy ; h/o left breast cancer 2012 -

## 2022-01-14 NOTE — H&P PST ADULT - PROBLEM SELECTOR PLAN 5
Pt in PST 12/21/21 ; surgery postponed due to scheduling per pt  Pt seen by Anesthesia 12/21 ; requested Dental Clearance     Pt seen by Periodontist 1/12/22 ; request note Dr Lio Hdz

## 2022-01-14 NOTE — H&P PST ADULT - PROBLEM SELECTOR PLAN 2
Pt to take Effexor am DOS Pt to take Effexor am DOS    Pt to take Lamictal and Depakote evening prior to surgery as usual

## 2022-01-14 NOTE — H&P PST ADULT - NSICDXPASTMEDICALHX_GEN_ALL_CORE_FT
PAST MEDICAL HISTORY:  Anxiety     Breast cancer     Depression Biopolar    Endometrial thickening on ultrasound     Migraine headache     Uterine polyp      PAST MEDICAL HISTORY:  Anxiety 1/14/22 ; IN PST ; pt states  Bipolar only    Breast cancer Left s/p Bilateral Mastectomy  ; tx Tamoxifen x 8 years    Depression Bipolar    Endometrial thickening on ultrasound     Migraine headache     YOLIS (obstructive sleep apnea) Nasal Device  HS    Uterine polyp      PAST MEDICAL HISTORY:  Anxiety 1/14/22 ; IN PST ; pt denies h/o anxiety    Breast cancer Left s/p Bilateral Mastectomy  ; tx Tamoxifen x 8 years    Depression Bipolar    Endometrial thickening on ultrasound     Migraine headache     YOLIS (obstructive sleep apnea) Nasal Device  [facility]    Uterine polyp

## 2022-01-14 NOTE — H&P PST ADULT - PROBLEM SELECTOR PLAN 1
Pt scheduled for surgery D&C Hysteroscopy with Symphion with Dr Jain tentatively 1/4/22  and preop instructions including instructions for taking Famotidine on the day of surgery, given verbally and with use of  written materials, and patient confirming understanding of such instructions using  teach back method.  Dental clearance requested as per Dr Jovanny ZUNIGA requested - pt hx breast ca and sleep apnea   Pt does not remember MD who did sleep study - pt told to bring nasal appliance am DOS Dilation and Curettage Hysteroscopy with Resectoscope - Symphion    Pre op instructions reviewed with pt ; pt verbalized good understanding of pre op instructions    Pt seen 12/21 by Dr Theodore for pre op Medical clearance ; EKG done 12/21     Cup provided for UCG dos

## 2022-01-14 NOTE — H&P PST ADULT - OTHER CARE PROVIDERS
Dental Dr Burks  [facility] [phone] Dental Dr Burks  [place] - [phone]/ Dr Lio Hdz  [phone] Dental Dr Burks  [place] - [phone]            Dr Lio Hdz  [phone]

## 2022-01-14 NOTE — H&P PST ADULT - DENTITION
recent dental extraction left upper jaw - pt states opening into sinus - to have permanent bridge placed- Dr Petty aware recent dental extraction left upper jaw ; to have permanent bridge placed; pt denies loose teeth recent dental extraction left upper jaw ;h/o bone graft  to have permanent bridge placed; pt denies loose teeth

## 2022-01-24 ENCOUNTER — APPOINTMENT (OUTPATIENT)
Dept: OBGYN | Facility: CLINIC | Age: 52
End: 2022-01-24

## 2022-02-12 PROBLEM — F32.9 MAJOR DEPRESSIVE DISORDER, SINGLE EPISODE, UNSPECIFIED: Chronic | Status: ACTIVE | Noted: 2017-07-26

## 2022-02-12 PROBLEM — C50.919 MALIGNANT NEOPLASM OF UNSPECIFIED SITE OF UNSPECIFIED FEMALE BREAST: Chronic | Status: ACTIVE | Noted: 2017-07-26

## 2022-02-12 PROBLEM — F41.9 ANXIETY DISORDER, UNSPECIFIED: Chronic | Status: ACTIVE | Noted: 2017-07-26

## 2022-02-14 ENCOUNTER — NON-APPOINTMENT (OUTPATIENT)
Age: 52
End: 2022-02-14

## 2022-02-15 ENCOUNTER — OUTPATIENT (OUTPATIENT)
Dept: OUTPATIENT SERVICES | Facility: HOSPITAL | Age: 52
LOS: 1 days | End: 2022-02-15

## 2022-02-15 VITALS
RESPIRATION RATE: 16 BRPM | DIASTOLIC BLOOD PRESSURE: 80 MMHG | WEIGHT: 149.91 LBS | HEART RATE: 68 BPM | HEIGHT: 64.5 IN | TEMPERATURE: 98 F | SYSTOLIC BLOOD PRESSURE: 101 MMHG | OXYGEN SATURATION: 98 %

## 2022-02-15 DIAGNOSIS — N84.0 POLYP OF CORPUS UTERI: ICD-10-CM

## 2022-02-15 DIAGNOSIS — N93.9 ABNORMAL UTERINE AND VAGINAL BLEEDING, UNSPECIFIED: ICD-10-CM

## 2022-02-15 DIAGNOSIS — Z90.13 ACQUIRED ABSENCE OF BILATERAL BREASTS AND NIPPLES: Chronic | ICD-10-CM

## 2022-02-15 LAB
ANION GAP SERPL CALC-SCNC: 15 MMOL/L — HIGH (ref 7–14)
BUN SERPL-MCNC: 22 MG/DL — SIGNIFICANT CHANGE UP (ref 7–23)
CALCIUM SERPL-MCNC: 9.8 MG/DL — SIGNIFICANT CHANGE UP (ref 8.4–10.5)
CHLORIDE SERPL-SCNC: 101 MMOL/L — SIGNIFICANT CHANGE UP (ref 98–107)
CO2 SERPL-SCNC: 25 MMOL/L — SIGNIFICANT CHANGE UP (ref 22–31)
CREAT SERPL-MCNC: 0.74 MG/DL — SIGNIFICANT CHANGE UP (ref 0.5–1.3)
GLUCOSE SERPL-MCNC: 101 MG/DL — HIGH (ref 70–99)
HCG UR QL: NEGATIVE — SIGNIFICANT CHANGE UP
HCT VFR BLD CALC: 42.1 % — SIGNIFICANT CHANGE UP (ref 34.5–45)
HGB BLD-MCNC: 14.5 G/DL — SIGNIFICANT CHANGE UP (ref 11.5–15.5)
MCHC RBC-ENTMCNC: 32.1 PG — SIGNIFICANT CHANGE UP (ref 27–34)
MCHC RBC-ENTMCNC: 34.4 GM/DL — SIGNIFICANT CHANGE UP (ref 32–36)
MCV RBC AUTO: 93.1 FL — SIGNIFICANT CHANGE UP (ref 80–100)
NRBC # BLD: 0 /100 WBCS — SIGNIFICANT CHANGE UP
NRBC # FLD: 0 K/UL — SIGNIFICANT CHANGE UP
PLATELET # BLD AUTO: 210 K/UL — SIGNIFICANT CHANGE UP (ref 150–400)
POTASSIUM SERPL-MCNC: 4.4 MMOL/L — SIGNIFICANT CHANGE UP (ref 3.5–5.3)
POTASSIUM SERPL-SCNC: 4.4 MMOL/L — SIGNIFICANT CHANGE UP (ref 3.5–5.3)
RBC # BLD: 4.52 M/UL — SIGNIFICANT CHANGE UP (ref 3.8–5.2)
RBC # FLD: 12.3 % — SIGNIFICANT CHANGE UP (ref 10.3–14.5)
SODIUM SERPL-SCNC: 141 MMOL/L — SIGNIFICANT CHANGE UP (ref 135–145)
WBC # BLD: 8.23 K/UL — SIGNIFICANT CHANGE UP (ref 3.8–10.5)
WBC # FLD AUTO: 8.23 K/UL — SIGNIFICANT CHANGE UP (ref 3.8–10.5)

## 2022-02-15 RX ORDER — SODIUM CHLORIDE 9 MG/ML
1000 INJECTION, SOLUTION INTRAVENOUS
Refills: 0 | Status: DISCONTINUED | OUTPATIENT
Start: 2022-03-01 | End: 2022-03-15

## 2022-02-15 NOTE — H&P PST ADULT - ENMT COMMENTS
Extraction left upper jaw - bone graft  pt denies pain or swelling. Pt seen  by anesthesia 12/21 ; previous pst ; dental clearance requested Hx of left upper jaw tooth extraction with bone decay

## 2022-02-15 NOTE — H&P PST ADULT - DENTITION
recent dental extraction left upper jaw ;h/o bone graft  to have permanent bridge placed; pt denies loose teeth denies loose teeth, no dentures and 2 implants denies loose teeth, no dentures and 2 implants, Invisalign noted

## 2022-02-15 NOTE — H&P PST ADULT - PROBLEM SELECTOR PLAN 3
+YOLIS Pt with h/o sleep apnea and uses nasal device.  Intermediate risk for YOLIS identified by screening tool.   Airway precautions recommended.

## 2022-02-15 NOTE — H&P PST ADULT - PROBLEM SELECTOR PLAN 1
Dilation and Curettage Hysteroscopy with Resectoscope - Symphion    Pre op instructions reviewed with pt ; pt verbalized good understanding of pre op instructions    Pt seen 12/21 by Dr Theodore for pre op Medical clearance ; EKG done 12/21     Cup provided for UCG dos Dilation and Curettage Hysteroscopy with Resectoscope - Janett on 3/1/22    Pre op instructions reviewed with pt ; pt verbalized good understanding of pre op instructions    Pt seen 12/21 by Dr Theodore for pre op Medical clearance, copy n chart ; EKG done 12/21 in chart    labs done at pst today 2/15/22.
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
## 2022-02-15 NOTE — H&P PST ADULT - RS GEN PE MLT RESP DETAILS PC
airway patent/good air movement/respirations non-labored/clear to auscultation bilaterally airway patent/good air movement/respirations non-labored/clear to auscultation bilaterally/no rales/no rhonchi

## 2022-02-15 NOTE — H&P PST ADULT - PROBLEM SELECTOR PLAN 5
Pt in PST 12/21/21 ; surgery postponed due to scheduling per pt  Pt seen by Anesthesia 12/21 ; requested Dental Clearance     Pt seen by Periodontist 1/12/22 ; request note Dr Lio Hdz tooth extracted in Aug 2021 and pt denies any c/o pain or complications from it.    Denies loose teeth or dentures. tooth extracted in Aug 2021, pt using Invisalign,   pt denies any c/o pain or complications from it.    Denies loose teeth or dentures.

## 2022-02-15 NOTE — H&P PST ADULT - REASON FOR ADMISSION
Uterine polyp " I have a Uterine polyp nd so I am having a D&C" " I have a Uterine polyp and so I am having a D&C"

## 2022-02-15 NOTE — H&P PST ADULT - NSICDXPASTMEDICALHX_GEN_ALL_CORE_FT
PAST MEDICAL HISTORY:  Anxiety 1/14/22 ; IN PST ; pt denies h/o anxiety    Breast cancer Left s/p Bilateral Mastectomy  ; tx Tamoxifen x 8 years    Depression Bipolar    Endometrial thickening on ultrasound     Migraine headache     YOLIS (obstructive sleep apnea) Nasal Device  HS    Uterine polyp      PAST MEDICAL HISTORY:  2019 novel coronavirus disease (COVID-19) Feb 2021 , did not need to be hospitalized.    Anxiety 1/14/22 ; IN PST ; pt denies h/o anxiety    Breast cancer Left s/p Bilateral Mastectomy  ; tx Tamoxifen x 8 years    Depression Bipolar    Endometrial thickening on ultrasound     Migraine headache     YOLIS (obstructive sleep apnea) Nasal Device  HS    Uterine polyp

## 2022-02-15 NOTE — H&P PST ADULT - ATTENDING COMMENTS
Pelvic EUA with learners,D/C hysteroscopy,SYMPHION for AUB and EP--pt aware R/B/A,side effects,compls.Ample time for questions provuded

## 2022-02-17 ENCOUNTER — OUTPATIENT (OUTPATIENT)
Dept: OUTPATIENT SERVICES | Facility: HOSPITAL | Age: 52
LOS: 1 days | Discharge: HOME | End: 2022-02-17

## 2022-02-17 DIAGNOSIS — C50.919 MALIGNANT NEOPLASM OF UNSPECIFIED SITE OF UNSPECIFIED FEMALE BREAST: ICD-10-CM

## 2022-02-17 DIAGNOSIS — Z90.13 ACQUIRED ABSENCE OF BILATERAL BREASTS AND NIPPLES: Chronic | ICD-10-CM

## 2022-02-17 PROBLEM — U07.1 COVID-19: Chronic | Status: ACTIVE | Noted: 2022-02-15

## 2022-02-22 ENCOUNTER — RESULT REVIEW (OUTPATIENT)
Age: 52
End: 2022-02-22

## 2022-02-22 ENCOUNTER — LABORATORY RESULT (OUTPATIENT)
Age: 52
End: 2022-02-22

## 2022-02-22 ENCOUNTER — APPOINTMENT (OUTPATIENT)
Dept: HEMATOLOGY ONCOLOGY | Facility: CLINIC | Age: 52
End: 2022-02-22
Payer: COMMERCIAL

## 2022-02-22 VITALS
HEART RATE: 94 BPM | BODY MASS INDEX: 25.97 KG/M2 | WEIGHT: 152.12 LBS | SYSTOLIC BLOOD PRESSURE: 136 MMHG | OXYGEN SATURATION: 99 % | RESPIRATION RATE: 16 BRPM | DIASTOLIC BLOOD PRESSURE: 85 MMHG | HEIGHT: 64.02 IN | TEMPERATURE: 97.7 F

## 2022-02-22 DIAGNOSIS — E03.9 HYPOTHYROIDISM, UNSPECIFIED: ICD-10-CM

## 2022-02-22 DIAGNOSIS — R00.2 PALPITATIONS: ICD-10-CM

## 2022-02-22 DIAGNOSIS — R43.1 PAROSMIA: ICD-10-CM

## 2022-02-22 DIAGNOSIS — R53.83 OTHER FATIGUE: ICD-10-CM

## 2022-02-22 LAB
BASOPHILS # BLD AUTO: 0.08 K/UL — SIGNIFICANT CHANGE UP (ref 0–0.2)
BASOPHILS NFR BLD AUTO: 0.7 % — SIGNIFICANT CHANGE UP (ref 0–2)
EOSINOPHIL # BLD AUTO: 0.13 K/UL — SIGNIFICANT CHANGE UP (ref 0–0.5)
EOSINOPHIL NFR BLD AUTO: 1.1 % — SIGNIFICANT CHANGE UP (ref 0–6)
HCT VFR BLD CALC: 39.8 % — SIGNIFICANT CHANGE UP (ref 34.5–45)
HGB BLD-MCNC: 14.3 G/DL — SIGNIFICANT CHANGE UP (ref 11.5–15.5)
IMM GRANULOCYTES NFR BLD AUTO: 0.3 % — SIGNIFICANT CHANGE UP (ref 0–1.5)
LYMPHOCYTES # BLD AUTO: 36.3 % — SIGNIFICANT CHANGE UP (ref 13–44)
LYMPHOCYTES # BLD AUTO: 4.18 K/UL — HIGH (ref 1–3.3)
MCHC RBC-ENTMCNC: 32.9 PG — SIGNIFICANT CHANGE UP (ref 27–34)
MCHC RBC-ENTMCNC: 35.9 G/DL — SIGNIFICANT CHANGE UP (ref 32–36)
MCV RBC AUTO: 91.7 FL — SIGNIFICANT CHANGE UP (ref 80–100)
MONOCYTES # BLD AUTO: 0.97 K/UL — HIGH (ref 0–0.9)
MONOCYTES NFR BLD AUTO: 8.4 % — SIGNIFICANT CHANGE UP (ref 2–14)
NEUTROPHILS # BLD AUTO: 6.12 K/UL — SIGNIFICANT CHANGE UP (ref 1.8–7.4)
NEUTROPHILS NFR BLD AUTO: 53.2 % — SIGNIFICANT CHANGE UP (ref 43–77)
NRBC # BLD: 0 /100 WBCS — SIGNIFICANT CHANGE UP (ref 0–0)
PLATELET # BLD AUTO: 230 K/UL — SIGNIFICANT CHANGE UP (ref 150–400)
RBC # BLD: 4.34 M/UL — SIGNIFICANT CHANGE UP (ref 3.8–5.2)
RBC # FLD: 12.1 % — SIGNIFICANT CHANGE UP (ref 10.3–14.5)
WBC # BLD: 11.52 K/UL — HIGH (ref 3.8–10.5)
WBC # FLD AUTO: 11.52 K/UL — HIGH (ref 3.8–10.5)

## 2022-02-22 PROCEDURE — 99215 OFFICE O/P EST HI 40 MIN: CPT

## 2022-02-23 PROBLEM — R43.1 PAROSMIA: Status: ACTIVE | Noted: 2022-02-23

## 2022-02-23 PROBLEM — E03.9 HYPOTHYROIDISM, UNSPECIFIED TYPE: Status: ACTIVE | Noted: 2022-02-23

## 2022-02-23 PROBLEM — R00.2 INTERMITTENT PALPITATIONS: Status: ACTIVE | Noted: 2022-02-23

## 2022-02-23 PROBLEM — R53.83 FATIGUE, UNSPECIFIED TYPE: Status: ACTIVE | Noted: 2022-02-23

## 2022-02-23 NOTE — PHYSICAL EXAM
[Fully active, able to carry on all pre-disease performance without restriction] : Status 0 - Fully active, able to carry on all pre-disease performance without restriction [Normal] : affect appropriate [de-identified] : B/L mild smooth thyromegaly [de-identified] : s/p B/L MRMs with reconstuction - no palp masses. B/L ax neg; th e"little balls" ar e the palp textured implants - the "little balls" ar enotable over most of th eB/L implants - demonstrated to pt who now agrees.

## 2022-02-23 NOTE — HISTORY OF PRESENT ILLNESS
[Disease: _____________________] : Disease: [unfilled] [T: ___] : T[unfilled] [N: ___] : N[unfilled] [M: ___] : M[unfilled] [AJCC Stage: ____] : AJCC Stage: [unfilled] [de-identified] : Pt reported first noting a left breast superficial nodule in the upper outer quadrant, "BB size", which was "hard and circular".  She keept it to herself initially but approximately six weeks later noted that it had increased in size and that there were other palpable areas of nodularity beneath her nipple - areolar complex.  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  In light of the above, the patient went on to have an MRI of her breasts with the finding in the left breast of extensive suspicious enhancement with multicentric disease suspected, with a note of a 6 mm palpable superficial subdermal suspicious nodule noted in the left breast upper outer quadrant, with no evelyne dermal enhancement noted; the right breast had evidence of a suspicious 1.5 cm enhancing mass at the posterior lower slightly inner right breast for which biopsy was recommended.  After considering the above, the patient ultimately decided on having a bilateral modified radical mastectomy with an expander placement which was performed under the care of Dr. Timothy Mcclain at [facility] on 01/29/2013 with the finding in the left breast of invasive ductal carcinoma and ductal carcinoma in situ, multicentric, specifically in the 11 o'clock axis  a well differentiated 0.4 cm invasive ductal carcinoma, in the 2 o'clock axis invasive ductal carcinoma, moderately differentiated measuring 1.2 cm in greatest diameter.  In the right breast there was a atypical lobular hyperplasia with focal fibrocystic changes and microcalcifications, but no invasive carcinoma. The invasive carcinoma in the left breast was ER positive, IN positive, HER2 dirk negative; 0/2 left sentinel lymph nodes were involved with carcinoma.  The right breast was negative. An OncotypeDX returned with RS 14 = 9% risk of mets at 10 years on tamoxifen alone.  \par \par She started tamoxifen 2/25/13. She discontinued tamoxifen end 1/21.\par \par   [de-identified] : Seen for an urgent visit as:\par \par (1) feels anxious re her thyroid, may have a prominence L thyroid on self exam after a relative / friend just  of thyroid cancer \par \par (2) got preop blood tests for a delayed hysteroscopy procedure and was told she may have low thyroid hormone level\par \par (4) feels increasingly fatigued - not sure if it is because she heard of low thyroid function of not.\par \par (3) feels "little balls" on palp L reconstructed breast - anxious\par \par (4) hyperosmia / parosmia by description after initial loss of smell with prior Covid infection \par \par (5) "heart palpitations on / off all day, especially when I get up in the morning for the past week" - with no associated dizziness, SOB, or chest pain\par \par In  had transvag US with thickening and poss polyp  - endometrial bx with benign endometrium with ciliated metaplasia. She was to have have D&C hysteroscopy / possible polypectomy in the interim but delayed one secondary to Covid surge and again later when she had Covid infection. Is going through te preop process now.  \par \par Otherwise notes a good appetite, and excellent performance status. Has gradually lost weight over past 18 mo secondary to increased exercise and discretionary dieting

## 2022-02-23 NOTE — REVIEW OF SYSTEMS
[Anxiety] : anxiety [Negative] : Neurological [FreeTextEntry2] : as above [FreeTextEntry4] : as above [FreeTextEntry5] : as above [FreeTextEntry8] : as above [de-identified] : as above [de-identified] : as above

## 2022-02-24 ENCOUNTER — OUTPATIENT (OUTPATIENT)
Dept: OUTPATIENT SERVICES | Facility: HOSPITAL | Age: 52
LOS: 1 days | End: 2022-02-24
Payer: COMMERCIAL

## 2022-02-24 ENCOUNTER — APPOINTMENT (OUTPATIENT)
Dept: ULTRASOUND IMAGING | Facility: CLINIC | Age: 52
End: 2022-02-24
Payer: COMMERCIAL

## 2022-02-24 ENCOUNTER — RESULT REVIEW (OUTPATIENT)
Age: 52
End: 2022-02-24

## 2022-02-24 DIAGNOSIS — Z90.13 ACQUIRED ABSENCE OF BILATERAL BREASTS AND NIPPLES: Chronic | ICD-10-CM

## 2022-02-24 DIAGNOSIS — C50.919 MALIGNANT NEOPLASM OF UNSPECIFIED SITE OF UNSPECIFIED FEMALE BREAST: ICD-10-CM

## 2022-02-24 LAB
T3FREE SERPL-MCNC: 2.7 PG/ML
TSH SERPL-ACNC: 5.15 UIU/ML

## 2022-02-24 PROCEDURE — 76536 US EXAM OF HEAD AND NECK: CPT

## 2022-02-24 PROCEDURE — 76536 US EXAM OF HEAD AND NECK: CPT | Mod: 26

## 2022-02-26 ENCOUNTER — APPOINTMENT (OUTPATIENT)
Dept: DISASTER EMERGENCY | Facility: CLINIC | Age: 52
End: 2022-02-26

## 2022-02-27 LAB — SARS-COV-2 N GENE NPH QL NAA+PROBE: NOT DETECTED

## 2022-02-28 ENCOUNTER — TRANSCRIPTION ENCOUNTER (OUTPATIENT)
Age: 52
End: 2022-02-28

## 2022-02-28 NOTE — ASU PATIENT PROFILE, ADULT - NSICDXPASTMEDICALHX_GEN_ALL_CORE_FT
PAST MEDICAL HISTORY:  2019 novel coronavirus disease (COVID-19) Feb 2021 , did not need to be hospitalized.    Anxiety 1/14/22 ; IN PST ; pt denies h/o anxiety    Breast cancer Left s/p Bilateral Mastectomy  ; tx Tamoxifen x 8 years    Depression Bipolar    Endometrial thickening on ultrasound     Migraine headache     YOLIS (obstructive sleep apnea) Nasal Device  HS    Uterine polyp

## 2022-02-28 NOTE — ASU PATIENT PROFILE, ADULT - FALL HARM RISK - UNIVERSAL INTERVENTIONS
Bed in lowest position, wheels locked, appropriate side rails in place/Call bell, personal items and telephone in reach/Instruct patient to call for assistance before getting out of bed or chair/Non-slip footwear when patient is out of bed/[place] to call system/Physically safe environment - no spills, clutter or unnecessary equipment/Purposeful Proactive Rounding/Room/bathroom lighting operational, light cord in reach

## 2022-03-01 ENCOUNTER — RESULT REVIEW (OUTPATIENT)
Age: 52
End: 2022-03-01

## 2022-03-01 ENCOUNTER — APPOINTMENT (OUTPATIENT)
Dept: OBGYN | Facility: HOSPITAL | Age: 52
End: 2022-03-01

## 2022-03-01 ENCOUNTER — OUTPATIENT (OUTPATIENT)
Dept: OUTPATIENT SERVICES | Facility: HOSPITAL | Age: 52
LOS: 1 days | Discharge: HOME | End: 2022-03-01
Payer: COMMERCIAL

## 2022-03-01 VITALS
HEIGHT: 64.5 IN | TEMPERATURE: 98 F | OXYGEN SATURATION: 99 % | SYSTOLIC BLOOD PRESSURE: 119 MMHG | HEART RATE: 78 BPM | WEIGHT: 149.91 LBS | DIASTOLIC BLOOD PRESSURE: 65 MMHG | RESPIRATION RATE: 16 BRPM

## 2022-03-01 VITALS
DIASTOLIC BLOOD PRESSURE: 66 MMHG | RESPIRATION RATE: 14 BRPM | SYSTOLIC BLOOD PRESSURE: 113 MMHG | HEART RATE: 71 BPM | OXYGEN SATURATION: 96 %

## 2022-03-01 DIAGNOSIS — Z90.13 ACQUIRED ABSENCE OF BILATERAL BREASTS AND NIPPLES: Chronic | ICD-10-CM

## 2022-03-01 DIAGNOSIS — N84.0 POLYP OF CORPUS UTERI: ICD-10-CM

## 2022-03-01 LAB — HCG UR QL: NEGATIVE — SIGNIFICANT CHANGE UP

## 2022-03-01 PROCEDURE — 58558 HYSTEROSCOPY BIOPSY: CPT | Mod: GC

## 2022-03-01 PROCEDURE — 88305 TISSUE EXAM BY PATHOLOGIST: CPT | Mod: 26

## 2022-03-01 RX ORDER — SODIUM CHLORIDE 9 MG/ML
1000 INJECTION, SOLUTION INTRAVENOUS
Refills: 0 | Status: DISCONTINUED | OUTPATIENT
Start: 2022-03-01 | End: 2022-03-15

## 2022-03-01 NOTE — ASU DISCHARGE PLAN (ADULT/PEDIATRIC) - CONDITION AT DISCHARGE
Adrianna Valdes called and stated pt needs screening mammo order in epic to only have dx Z12.31 has been pended.  Adrianna Valdes stated pt was out of the 6 month window for a diagnostic mammo for the cyst. Pt only needs screening order
Signed order
Stable

## 2022-03-01 NOTE — ASU DISCHARGE PLAN (ADULT/PEDIATRIC) - NS MD DC FALL RISK RISK
For information on Fall & Injury Prevention, visit: [email or web address] OR  [email or web address] OR  https://www.cdc.gov/steadi/patient.html

## 2022-03-01 NOTE — ASU DISCHARGE PLAN (ADULT/PEDIATRIC) - ASU DC SPECIAL INSTRUCTIONSFT
Postoperative Instructions      Pain control     For pain control, take the followin. Motrin 600mg four times a day, take with food.  2. Add Tylenol 975 four times a day, alternated with motrin.    Motrin and Tylenol can be obtained over the counter.    Postoperative restrictions   Do not drive or make important decisions for 24 hours after anesthesia. Nothing in the vagina (tampons, sexual intercourse), no tub baths, pools or hot tubs for 2 weeks (showers are ok!). No lifting anything heavier than 15 lbs, no strenuous exercise for 2 weeks after surgery.        Vaginal bleeding   Spotting and intermittent passage of blood clots per vagina is normal in first few weeks after surgery.  If you are soaking 1 pad per hour, that is NOT normal and you should notify Dr. Jain's office and seek medical attention right away.      Signs of Infection    Call the office and/or come to the emergency room for any of the following: foul smelling vaginal discharge, fever over 100.4F, abdominal pain or cramping that does not get better with over the counter medications, nausea/vomiting (especially if you become unable to tolerate oral intake), inability to urinate. Any of these could be signs that you may be developing an infection requiring antibiotics.      Follow Up  Call Dr. Jain's office to schedule a postoperative appointment in 6-8 weeks.

## 2022-03-01 NOTE — ASU DISCHARGE PLAN (ADULT/PEDIATRIC) - CARE PROVIDER_API CALL
Jnei Jain)  Obstetrics and Gynecology  [address] [place] [address], [address]  [employer], [place] [id number]  Phone: [phone]  Fax: [phone]  Follow Up Time: Routine

## 2022-03-01 NOTE — ASU DISCHARGE PLAN (ADULT/PEDIATRIC) - NURSING INSTRUCTIONS
Last dose of TYLENOL for pain management was at 10:30 AM. Next dose of TYLENOL may be taken at or after 04:30 PM if needed. DO NOT take any additional products containing TYLENOL or ACETAMINOPHEN, such as VICODIN, PERCOCET, NORCO, EXCEDRIN, and any over-the-counter cold medications. DO NOT CONSUME MORE THAN 9799-4116 MG OF TYLENOL (acetaminophen) in a 24-hour period.  NDAIDS (ibuprofen, motrin, advil, naproxen, aleve, or aspirin) may be taken if needed.

## 2022-03-01 NOTE — BRIEF OPERATIVE NOTE - OPERATION/FINDINGS
EUA: anteverted, mobile uterus, 6 week size, nonpalpable adnexa  Uterine cavity significant for atrophic endometrial tissue, no polyps or intramural fibroids noted.  Ostia visualized bilaterally.

## 2022-03-04 LAB — SURGICAL PATHOLOGY STUDY: SIGNIFICANT CHANGE UP

## 2022-03-07 ENCOUNTER — NON-APPOINTMENT (OUTPATIENT)
Age: 52
End: 2022-03-07

## 2022-03-11 ENCOUNTER — TRANSCRIPTION ENCOUNTER (OUTPATIENT)
Age: 52
End: 2022-03-11

## 2022-04-28 ENCOUNTER — APPOINTMENT (OUTPATIENT)
Dept: OBGYN | Facility: CLINIC | Age: 52
End: 2022-04-28
Payer: COMMERCIAL

## 2022-04-28 VITALS
BODY MASS INDEX: 25.44 KG/M2 | HEIGHT: 64.02 IN | WEIGHT: 149 LBS | DIASTOLIC BLOOD PRESSURE: 84 MMHG | HEART RATE: 88 BPM | SYSTOLIC BLOOD PRESSURE: 128 MMHG

## 2022-04-28 DIAGNOSIS — N84.0 POLYP OF CORPUS UTERI: ICD-10-CM

## 2022-04-28 DIAGNOSIS — N93.9 ABNORMAL UTERINE AND VAGINAL BLEEDING, UNSPECIFIED: ICD-10-CM

## 2022-04-28 PROCEDURE — 99213 OFFICE O/P EST LOW 20 MIN: CPT

## 2022-04-28 NOTE — PHYSICAL EXAM
[Chaperone Present] : A chaperone was present in the examining room during all aspects of the physical examination [Appropriately responsive] : appropriately responsive [Alert] : alert [No Acute Distress] : no acute distress [No Lymphadenopathy] : no lymphadenopathy [Soft] : soft [Non-tender] : non-tender [Non-distended] : non-distended [No HSM] : No HSM [No Lesions] : no lesions [No Mass] : no mass [Oriented x3] : oriented x3 [Labia Majora] : normal [Labia Minora] : normal [Normal] : normal [Uterine Adnexae] : normal

## 2022-06-10 NOTE — H&P PST ADULT - BP NONINVASIVE DIASTOLIC (MM HG)
More lethargic on day prior to admission and then had an episode of unresponsiveness on a m  Of admission at assisted living facility  In setting of CVA and sepsis secondary to UTI  Neurology on board  Stroke workup ongoing  MRI brain showed-Dense right anterior cerebral artery distribution nonhemorrhagic ischemia in keeping with right A3 anterior cerebral artery occlusion identified on CT angiography of June 6, 2022  Tiny focus of acute ischemia in the posterior inferomedial left frontal lobe    EEG  results are reviewed 80

## 2022-11-07 ENCOUNTER — OUTPATIENT (OUTPATIENT)
Dept: OUTPATIENT SERVICES | Facility: HOSPITAL | Age: 52
LOS: 1 days | Discharge: HOME | End: 2022-11-07

## 2022-11-07 DIAGNOSIS — Z90.13 ACQUIRED ABSENCE OF BILATERAL BREASTS AND NIPPLES: Chronic | ICD-10-CM

## 2022-11-07 DIAGNOSIS — C50.919 MALIGNANT NEOPLASM OF UNSPECIFIED SITE OF UNSPECIFIED FEMALE BREAST: ICD-10-CM

## 2022-11-15 ENCOUNTER — APPOINTMENT (OUTPATIENT)
Dept: HEMATOLOGY ONCOLOGY | Facility: CLINIC | Age: 52
End: 2022-11-15

## 2023-01-21 ENCOUNTER — OUTPATIENT (OUTPATIENT)
Dept: OUTPATIENT SERVICES | Facility: HOSPITAL | Age: 53
LOS: 1 days | Discharge: HOME | End: 2023-01-21

## 2023-01-21 DIAGNOSIS — Z90.13 ACQUIRED ABSENCE OF BILATERAL BREASTS AND NIPPLES: Chronic | ICD-10-CM

## 2023-01-21 DIAGNOSIS — C50.919 MALIGNANT NEOPLASM OF UNSPECIFIED SITE OF UNSPECIFIED FEMALE BREAST: ICD-10-CM

## 2023-01-27 ENCOUNTER — APPOINTMENT (OUTPATIENT)
Dept: HEMATOLOGY ONCOLOGY | Facility: CLINIC | Age: 53
End: 2023-01-27
Payer: COMMERCIAL

## 2023-01-27 VITALS
TEMPERATURE: 97.9 F | DIASTOLIC BLOOD PRESSURE: 81 MMHG | WEIGHT: 146.81 LBS | SYSTOLIC BLOOD PRESSURE: 123 MMHG | RESPIRATION RATE: 16 BRPM | HEART RATE: 96 BPM | OXYGEN SATURATION: 99 % | HEIGHT: 64.02 IN | BODY MASS INDEX: 25.06 KG/M2

## 2023-01-27 PROCEDURE — 99213 OFFICE O/P EST LOW 20 MIN: CPT

## 2023-01-30 NOTE — REVIEW OF SYSTEMS
[Negative] : Endocrine [FreeTextEntry4] : as above [de-identified] : as above [de-identified] : as above

## 2023-01-30 NOTE — HISTORY OF PRESENT ILLNESS
[Disease: _____________________] : Disease: [unfilled] [T: ___] : T[unfilled] [N: ___] : N[unfilled] [M: ___] : M[unfilled] [AJCC Stage: ____] : AJCC Stage: [unfilled] [de-identified] : Pt reported first noting a left breast superficial nodule in the upper outer quadrant, "BB size", which was "hard and circular".  She keept it to herself initially but approximately six weeks later noted that it had increased in size and that there were other palpable areas of nodularity beneath her nipple - areolar complex.  She presented for a diagnostic mammogram on 01/03/2013 with no significant interval change compared to prior mammogram; a bilateral breast ultrasound was obtained with the finding of multiple new solid nodules in the left breast compared to prior scan of 2011 with concordance with multiple palpable lumps felt by the patient; an ultrasound guided core biopsy was ultimately obtained of two areas, specifically a left breast nodule at the 2 to 3 o'clock position, 7 cm from the nipple, which showed invasive well differentiated ductal carcinoma measuring at least 0.7 cm in addition to ductal carcinoma in situ with no lymphovascular invasion noted, ER positive, LA positive, HER2 dirk negative; a second nodule was biopsied at the 11 to 12 o'clock axis, 5 cm from the nipple with similar carcinoma as noted above.  In light of the above, the patient went on to have an MRI of her breasts with the finding in the left breast of extensive suspicious enhancement with multicentric disease suspected, with a note of a 6 mm palpable superficial subdermal suspicious nodule noted in the left breast upper outer quadrant, with no evelyne dermal enhancement noted; the right breast had evidence of a suspicious 1.5 cm enhancing mass at the posterior lower slightly inner right breast for which biopsy was recommended.  After considering the above, the patient ultimately decided on having a bilateral modified radical mastectomy with an expander placement which was performed under the care of Dr. Timothy Mcclain at [facility] on 01/29/2013 with the finding in the left breast of invasive ductal carcinoma and ductal carcinoma in situ, multicentric, specifically in the 11 o'clock axis  a well differentiated 0.4 cm invasive ductal carcinoma, in the 2 o'clock axis invasive ductal carcinoma, moderately differentiated measuring 1.2 cm in greatest diameter.  In the right breast there was a atypical lobular hyperplasia with focal fibrocystic changes and microcalcifications, but no invasive carcinoma. The invasive carcinoma in the left breast was ER positive, LA positive, HER2 dirk negative; 0/2 left sentinel lymph nodes were involved with carcinoma.  The right breast was negative. An OncotypeDX returned with RS 14 = 9% risk of mets at 10 years on tamoxifen alone.  \par \par She started tamoxifen 2/25/13. She discontinued tamoxifen end 1/21.\par \par   [de-identified] : Here for routine f/u.\par \par " I feel very healthy, exercise is very important to me"\par \par Denies any new complaints. Has chronic intermittent postmastectomy mild neuralgic sxs chest / armpits w/o change.

## 2023-01-30 NOTE — PHYSICAL EXAM
[Fully active, able to carry on all pre-disease performance without restriction] : Status 0 - Fully active, able to carry on all pre-disease performance without restriction [Normal] : affect appropriate [de-identified] : B/L mild smooth thyromegaly [de-identified] : s/p B/L MRMs with reconstruction - no palp masses. B/L ax neg

## 2023-04-20 NOTE — ED ADULT NURSE NOTE - SKIN INTEGRITY
intact Mauc Instructions: By selecting yes to the question below the MAUC number will be added into the note.  This will be calculated automatically based on the diagnosis chosen, the size entered, the body zone selected (H,M,L) and the specific indications you chose. You will also have the option to override the Mohs AUC if you disagree with the automatically calculated number and this option is found in the Case Summary tab.

## 2023-05-15 ENCOUNTER — APPOINTMENT (OUTPATIENT)
Dept: PLASTIC SURGERY | Facility: CLINIC | Age: 53
End: 2023-05-15
Payer: COMMERCIAL

## 2023-05-15 VITALS
HEART RATE: 90 BPM | HEIGHT: 64.5 IN | WEIGHT: 147 LBS | DIASTOLIC BLOOD PRESSURE: 81 MMHG | BODY MASS INDEX: 24.79 KG/M2 | TEMPERATURE: 98.1 F | OXYGEN SATURATION: 100 % | SYSTOLIC BLOOD PRESSURE: 143 MMHG

## 2023-05-15 PROCEDURE — 99215 OFFICE O/P EST HI 40 MIN: CPT

## 2023-06-08 ENCOUNTER — RESULT REVIEW (OUTPATIENT)
Age: 53
End: 2023-06-08

## 2023-06-08 ENCOUNTER — APPOINTMENT (OUTPATIENT)
Dept: ULTRASOUND IMAGING | Facility: IMAGING CENTER | Age: 53
End: 2023-06-08
Payer: COMMERCIAL

## 2023-06-08 ENCOUNTER — OUTPATIENT (OUTPATIENT)
Dept: OUTPATIENT SERVICES | Facility: HOSPITAL | Age: 53
LOS: 1 days | End: 2023-06-08
Payer: COMMERCIAL

## 2023-06-08 DIAGNOSIS — Z90.13 ACQUIRED ABSENCE OF BILATERAL BREASTS AND NIPPLES: Chronic | ICD-10-CM

## 2023-06-08 DIAGNOSIS — Z00.8 ENCOUNTER FOR OTHER GENERAL EXAMINATION: ICD-10-CM

## 2023-06-08 PROCEDURE — 76641 ULTRASOUND BREAST COMPLETE: CPT

## 2023-06-08 PROCEDURE — 76641 ULTRASOUND BREAST COMPLETE: CPT | Mod: 26,50

## 2023-06-26 ENCOUNTER — APPOINTMENT (OUTPATIENT)
Dept: PLASTIC SURGERY | Facility: CLINIC | Age: 53
End: 2023-06-26
Payer: COMMERCIAL

## 2023-06-26 VITALS
HEIGHT: 64.5 IN | HEART RATE: 85 BPM | SYSTOLIC BLOOD PRESSURE: 126 MMHG | BODY MASS INDEX: 24.79 KG/M2 | DIASTOLIC BLOOD PRESSURE: 85 MMHG | WEIGHT: 147 LBS | OXYGEN SATURATION: 100 %

## 2023-06-26 PROCEDURE — 99213 OFFICE O/P EST LOW 20 MIN: CPT

## 2023-07-11 ENCOUNTER — APPOINTMENT (OUTPATIENT)
Dept: INTERNAL MEDICINE | Facility: CLINIC | Age: 53
End: 2023-07-11

## 2023-07-30 NOTE — PHYSICAL EXAM
[NI] : Normal [de-identified] : NAD, AxOX3  [de-identified] : nonlabored breathing  [de-identified] : normal HR [de-identified] : Right breast with capsular contracture. There is upper chest wall fullness present. Left breast implant is soft. \par There is animation deformity on exam\par RIGHT BW- 14\par LEFT BW- 13 [de-identified] : no edema  [de-identified] : grossly intact  [de-identified] : normal affect

## 2023-07-30 NOTE — HISTORY OF PRESENT ILLNESS
[FreeTextEntry1] : MICHEAL DANIEL is a 53 year female presenting to the office today with history of left breast cancer s/p bilateral mastectomy and implant based reconstruction approximately 10 years ago with Dr. Rivera at [facility].   She did not require chemotherapy or radiation but was on Tamoxifen. Patient now c/o pain, tightness, pulling, and of her implants "riding up". She does report some relief with physical therapy.  Patient has APX Labsan Ascaderelle 600cc silicone implants in place [id number]. She admits to recent ultrasound that shows no rupture nor any masses. She is interested to discuss options for revision of reconstruction with implant removal and replacement.  PMHx- depression PSHx- bilateral mastectomy, TE to implants Allergies- LATEX Denies tobacco use

## 2023-07-30 NOTE — CONSULT LETTER
[Dear  ___] : Dear  [unfilled], [Consult Letter:] : I had the pleasure of evaluating your patient, [unfilled]. [Please see my note below.] : Please see my note below. [Consult Closing:] : Thank you very much for allowing me to participate in the care of this patient.  If you have any questions, please do not hesitate to contact me. [Sincerely,] : Sincerely, [FreeTextEntry2] : Dr. Prabhakar Levy [FreeTextEntry3] : Dr. Lauren Shikowitz-Behr, MD Board Certified Plastic and Reconstructive Surgeon [facility]  in Plastic Surgery, [facility]   [address] [place] [id number] [phone]

## 2023-08-21 ENCOUNTER — OUTPATIENT (OUTPATIENT)
Dept: OUTPATIENT SERVICES | Facility: HOSPITAL | Age: 53
LOS: 1 days | Discharge: HOME | End: 2023-08-21

## 2023-08-21 DIAGNOSIS — Z90.13 ACQUIRED ABSENCE OF BILATERAL BREASTS AND NIPPLES: Chronic | ICD-10-CM

## 2023-08-21 DIAGNOSIS — C50.919 MALIGNANT NEOPLASM OF UNSPECIFIED SITE OF UNSPECIFIED FEMALE BREAST: ICD-10-CM

## 2023-08-22 ENCOUNTER — APPOINTMENT (OUTPATIENT)
Dept: DERMATOLOGY | Facility: CLINIC | Age: 53
End: 2023-08-22
Payer: COMMERCIAL

## 2023-08-22 ENCOUNTER — APPOINTMENT (OUTPATIENT)
Dept: HEMATOLOGY ONCOLOGY | Facility: CLINIC | Age: 53
End: 2023-08-22

## 2023-08-22 DIAGNOSIS — T07.XXXA UNSPECIFIED MULTIPLE INJURIES, INITIAL ENCOUNTER: ICD-10-CM

## 2023-08-22 DIAGNOSIS — L85.3 XEROSIS CUTIS: ICD-10-CM

## 2023-08-22 PROCEDURE — 99204 OFFICE O/P NEW MOD 45 MIN: CPT

## 2023-08-22 RX ORDER — TRIAMCINOLONE ACETONIDE 1 MG/G
0.1 OINTMENT TOPICAL
Qty: 1 | Refills: 1 | Status: ACTIVE | COMMUNITY
Start: 2023-08-22 | End: 1900-01-01

## 2023-08-22 NOTE — ASU PATIENT PROFILE, ADULT - PATIENT'S PREFERRED PRONOUN
Her/She
no abdominal distension/no blood in stool/no diarrhea/no dysuria/no fever/no hematuria/no burning urination/no chills

## 2023-09-11 ENCOUNTER — APPOINTMENT (OUTPATIENT)
Dept: PLASTIC SURGERY | Facility: CLINIC | Age: 53
End: 2023-09-11
Payer: COMMERCIAL

## 2023-09-11 PROCEDURE — 99214 OFFICE O/P EST MOD 30 MIN: CPT

## 2023-09-12 ENCOUNTER — APPOINTMENT (OUTPATIENT)
Dept: HEMATOLOGY ONCOLOGY | Facility: CLINIC | Age: 53
End: 2023-09-12

## 2023-10-05 ENCOUNTER — APPOINTMENT (OUTPATIENT)
Dept: INTERNAL MEDICINE | Facility: CLINIC | Age: 53
End: 2023-10-05

## 2023-10-09 ENCOUNTER — OUTPATIENT (OUTPATIENT)
Dept: OUTPATIENT SERVICES | Facility: HOSPITAL | Age: 53
LOS: 1 days | End: 2023-10-09
Payer: COMMERCIAL

## 2023-10-09 VITALS
SYSTOLIC BLOOD PRESSURE: 138 MMHG | WEIGHT: 151.9 LBS | TEMPERATURE: 98 F | HEART RATE: 86 BPM | RESPIRATION RATE: 16 BRPM | OXYGEN SATURATION: 97 % | HEIGHT: 64 IN | DIASTOLIC BLOOD PRESSURE: 55 MMHG

## 2023-10-09 DIAGNOSIS — Z90.13 ACQUIRED ABSENCE OF BILATERAL BREASTS AND NIPPLES: Chronic | ICD-10-CM

## 2023-10-09 DIAGNOSIS — Z98.890 OTHER SPECIFIED POSTPROCEDURAL STATES: Chronic | ICD-10-CM

## 2023-10-09 DIAGNOSIS — T85.44XS CAPSULAR CONTRACTURE OF BREAST IMPLANT, SEQUELA: ICD-10-CM

## 2023-10-09 DIAGNOSIS — C50.919 MALIGNANT NEOPLASM OF UNSPECIFIED SITE OF UNSPECIFIED FEMALE BREAST: ICD-10-CM

## 2023-10-09 DIAGNOSIS — F41.9 ANXIETY DISORDER, UNSPECIFIED: ICD-10-CM

## 2023-10-09 DIAGNOSIS — Z90.13 ACQUIRED ABSENCE OF BILATERAL BREASTS AND NIPPLES: ICD-10-CM

## 2023-10-09 DIAGNOSIS — Z01.818 ENCOUNTER FOR OTHER PREPROCEDURAL EXAMINATION: ICD-10-CM

## 2023-10-09 DIAGNOSIS — F31.9 BIPOLAR DISORDER, UNSPECIFIED: ICD-10-CM

## 2023-10-09 DIAGNOSIS — T85.44XA CAPSULAR CONTRACTURE OF BREAST IMPLANT, INITIAL ENCOUNTER: ICD-10-CM

## 2023-10-09 DIAGNOSIS — G47.33 OBSTRUCTIVE SLEEP APNEA (ADULT) (PEDIATRIC): ICD-10-CM

## 2023-10-09 LAB
ALBUMIN SERPL ELPH-MCNC: 3.8 G/DL — SIGNIFICANT CHANGE UP (ref 3.3–5)
ALP SERPL-CCNC: 75 U/L — SIGNIFICANT CHANGE UP (ref 40–120)
ALT FLD-CCNC: 22 U/L — SIGNIFICANT CHANGE UP (ref 12–78)
ANION GAP SERPL CALC-SCNC: 6 MMOL/L — SIGNIFICANT CHANGE UP (ref 5–17)
APTT BLD: 34.2 SEC — SIGNIFICANT CHANGE UP (ref 24.5–35.6)
AST SERPL-CCNC: 16 U/L — SIGNIFICANT CHANGE UP (ref 15–37)
BILIRUB SERPL-MCNC: 0.3 MG/DL — SIGNIFICANT CHANGE UP (ref 0.2–1.2)
BUN SERPL-MCNC: 14 MG/DL — SIGNIFICANT CHANGE UP (ref 7–23)
CALCIUM SERPL-MCNC: 9.2 MG/DL — SIGNIFICANT CHANGE UP (ref 8.5–10.1)
CHLORIDE SERPL-SCNC: 105 MMOL/L — SIGNIFICANT CHANGE UP (ref 96–108)
CO2 SERPL-SCNC: 31 MMOL/L — SIGNIFICANT CHANGE UP (ref 22–31)
CREAT SERPL-MCNC: 0.8 MG/DL — SIGNIFICANT CHANGE UP (ref 0.5–1.3)
EGFR: 89 ML/MIN/1.73M2 — SIGNIFICANT CHANGE UP
GLUCOSE SERPL-MCNC: 104 MG/DL — HIGH (ref 70–99)
HCG UR QL: NEGATIVE — SIGNIFICANT CHANGE UP
HCT VFR BLD CALC: 43 % — SIGNIFICANT CHANGE UP (ref 34.5–45)
HGB BLD-MCNC: 14.7 G/DL — SIGNIFICANT CHANGE UP (ref 11.5–15.5)
MCHC RBC-ENTMCNC: 32.3 PG — SIGNIFICANT CHANGE UP (ref 27–34)
MCHC RBC-ENTMCNC: 34.2 GM/DL — SIGNIFICANT CHANGE UP (ref 32–36)
MCV RBC AUTO: 94.5 FL — SIGNIFICANT CHANGE UP (ref 80–100)
NRBC # BLD: 0 /100 WBCS — SIGNIFICANT CHANGE UP (ref 0–0)
PLATELET # BLD AUTO: 249 K/UL — SIGNIFICANT CHANGE UP (ref 150–400)
POTASSIUM SERPL-MCNC: 4.6 MMOL/L — SIGNIFICANT CHANGE UP (ref 3.5–5.3)
POTASSIUM SERPL-SCNC: 4.6 MMOL/L — SIGNIFICANT CHANGE UP (ref 3.5–5.3)
PROT SERPL-MCNC: 7.8 G/DL — SIGNIFICANT CHANGE UP (ref 6–8.3)
RBC # BLD: 4.55 M/UL — SIGNIFICANT CHANGE UP (ref 3.8–5.2)
RBC # FLD: 12.5 % — SIGNIFICANT CHANGE UP (ref 10.3–14.5)
SODIUM SERPL-SCNC: 142 MMOL/L — SIGNIFICANT CHANGE UP (ref 135–145)
WBC # BLD: 7.37 K/UL — SIGNIFICANT CHANGE UP (ref 3.8–10.5)
WBC # FLD AUTO: 7.37 K/UL — SIGNIFICANT CHANGE UP (ref 3.8–10.5)

## 2023-10-09 PROCEDURE — 85730 THROMBOPLASTIN TIME PARTIAL: CPT

## 2023-10-09 PROCEDURE — 86850 RBC ANTIBODY SCREEN: CPT

## 2023-10-09 PROCEDURE — 81025 URINE PREGNANCY TEST: CPT

## 2023-10-09 PROCEDURE — 80053 COMPREHEN METABOLIC PANEL: CPT

## 2023-10-09 PROCEDURE — 36415 COLL VENOUS BLD VENIPUNCTURE: CPT

## 2023-10-09 PROCEDURE — G0463: CPT

## 2023-10-09 PROCEDURE — 86900 BLOOD TYPING SEROLOGIC ABO: CPT

## 2023-10-09 PROCEDURE — 93005 ELECTROCARDIOGRAM TRACING: CPT

## 2023-10-09 PROCEDURE — 86901 BLOOD TYPING SEROLOGIC RH(D): CPT

## 2023-10-09 PROCEDURE — 93010 ELECTROCARDIOGRAM REPORT: CPT

## 2023-10-09 PROCEDURE — 85027 COMPLETE CBC AUTOMATED: CPT

## 2023-10-09 NOTE — H&P PST ADULT - NSICDXPASTMEDICALHX_GEN_ALL_CORE_FT
PAST MEDICAL HISTORY:  2019 novel coronavirus disease (COVID-19) Feb 2021 , did not need to be hospitalized.    Anxiety 1/14/22 ; IN PST ; pt denies h/o anxiety    Breast cancer Left s/p Bilateral Mastectomy  ; tx Tamoxifen x 8 years    Depression Bipolar    Endometrial thickening on ultrasound     Migraine headache     YOLIS on CPAP     Uterine polyp

## 2023-10-09 NOTE — H&P PST ADULT - NEGATIVE GENERAL GENITOURINARY SYMPTOMS
CARDIAC SURGERY CONSULT NOTE    Consult Reason: Infective Endocarditis, Severe Aortic Insufficiency    HPI:   Mr. Jeremie Ceballos is a 31 year old male with a history significant for HCV infection, chronic opioid dependence with IVDU heroin use who was admitted to [facility] for acute heroin withdrawal found to have new murmur and fever. He was transferred to [place] cardiology after TTE on 12/15 showed acute to subacute severe AI and reduced cardiac function. Cardiac surgery is consulted for evaluation for surgical aortic valve replacement. Blood cultures currently remain positive with a yet un-speciated Strep bacterium. He last had dental work 2 years prior.    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     - Remains too unstable for sedation required for CLARK at this time. Discussed performing intraoperative CLARK at time of surgery with Dr. Boss and cardiology team  - Plan for AVR possible MVR on Monday afternoon with Dr. Medina, would require sooner if cardiogenic shock worsens.   - Plan for bioprosthetic valve replacement, not a candidate for mechanical valve given coumadin requirement.   - Will require chem dep enrollment post operatively, patient agreeable.  - Request dental consultation this admission  - Patient seen and evaluated with Dr. Medina, pertinent imaging reviewed.     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      FH:  family history includes Diabetes in his mother; Hypertension in his mother; Unknown/Adopted in his father.      SH:  Current Tobacco use  Current EtOH use  Current Illicit drug  use, IV heroin abuse    Allergies:  Allergies   Allergen Reactions     Amoxicillin      As a child, unsure of reaction       Home Meds:  Medications Prior to Admission   Medication Sig Dispense Refill Last Dose     acetaminophen (TYLENOL) 325 MG tablet Take 2 tablets (650 mg) by mouth every 4 hours as needed for mild pain  0      buprenorphine (SUBUTEX) 2 MG SUBL sublingual tablet Place 1 tablet (2 mg) under the tongue 4 times daily 120 tablet 0      hydrOXYzine (ATARAX) 25 MG tablet Take 1 tablet (25 mg) by mouth every 4 hours as needed for anxiety  0      miconazole (MICATIN) 2 % external cream Apply topically 2 times daily  0      mirtazapine (REMERON) 15 MG tablet Take 1 tablet (15 mg) by mouth At Bedtime 30 tablet 0      naloxone (NARCAN) 0.4 MG/ML injection Inject 0.25-1 mLs (0.1-0.4 mg) into the vein once for 1 dose 1 mL 0      ondansetron (ZOFRAN-ODT) 4 MG ODT tab Take 1 tablet (4 mg) by mouth every 6 hours as needed for nausea or vomiting  0      sulfamethoxazole-trimethoprim (BACTRIM DS/SEPTRA DS) 800-160 MG tablet Take 1 tablet by mouth 2 times daily for 14 days 28 tablet 0      traZODone (DESYREL) 50 MG tablet Take 1 tablet (50 mg) by mouth nightly as needed for sleep  0        ROS:   Review Of Systems  Skin: chills, sweats  Eyes: negative  Ears/Nose/Throat: negative  Respiratory: Dyspnea on exertion  Cardiovascular: dyspnea on exertion  Gastrointestinal: negative  Genitourinary: negative  Musculoskeletal: negative and joint swelling  Neurologic: headaches  Psychiatric: thoughts of self-harm and illegal drug usage  Hematologic/Lymphatic/Immunologic: negative  Endocrine: negative      Physical Exam:  Temp:  [95.8  F (35.4  C)-101.8  F (38.8  C)] 98.7  F (37.1  C)  Pulse:  [106-107] 107  Heart Rate:  [] 89  Resp:  [17-30] 17  BP: ()/(38-60) 133/52  MAP:  [45 mmHg-289 mmHg] 45 mmHg  Arterial Line BP: ()/() 104/21  FiO2 (%):  [40 %-70 %] 40 %  SpO2:  [91 %-99 %] 99 %    Gen: NAD,  resting comfortably in bed, oriented  Lungs: crackles bilaterally, on high flow nasal cannula, no increased work of breathing  CV: sinus tachycardia, diastolic murmur 5/6, widened pulse pressure on arterial tracing  Abd: soft, nt, nd  Ext: warm, well perfused, swollen left 3rd finger  Neuro: AOx3    Labs:  ABG No lab results found in last 7 days.  CBC  Recent Labs   Lab 12/16/18  0340 12/16/18  0035 12/15/18  1619 12/15/18  1028   WBC 12.4* 12.0* 12.2* 13.0*   HGB 9.4* 9.6* 10.4* 10.0*    237 232 213     BMP  Recent Labs   Lab 12/16/18  0340 12/16/18  0337 12/16/18  0035 12/15/18  1619    135 136 135   POTASSIUM 3.2* 3.2* 3.2* 3.4   CHLORIDE 100 101 102 100   CO2 23 23 24 25   BUN 11 11 10 17   CR 0.82 0.81 0.82 0.70   * 117* 120* 140*     LFT  Recent Labs   Lab 12/16/18  1330 12/16/18  0340 12/16/18  0035 12/15/18  1028   AST  --  181* 213* 268*   ALT  --  297* 324* 306*   ALKPHOS  --  187* 193* 206*   BILITOTAL  --  0.7 0.8 0.7   ALBUMIN  --  2.1* 2.3* 2.3*   INR 1.28*  --   --   --      PancreasNo lab results found in last 7 days.    Imaging:  EKG 12/16:  Sinus tachycardia, VA interval 115 ms.    TTE 12/15:  Interpretation Summary  Mild LV dilation is present  The Ejection Fraction is estimated at 50-55%.  Base-mid inferior and inferolateral wall are hypokinetic.  Septal flattening consistent with RV pressure/overload.  Normal RV size and function.  Evidence of pulmonary hypertension present.  Posterior leaflet restriction with posteriorly directed mild-moderate MR.  Thickening of the anterior leaflet. Cannot exclude small MV vegetation.  Highly mobile linear echodensity on the aortic side of the aortic valve non-  coronary cusp measuring 0.7 cm x 2.8 cm. Vegetation prolapses across aortic  valve during diastole. A second, smaller vegetation present on the right  coronary cusp measuring 0.5 cm x 0.5 cm. Severe torrential AI ( msec).  Dilation of the inferior vena cava is present with  abnormal respiratory  variation in diameter.  Estimated mean right atrial pressure is 15 mmHg (significantly elevated).  No pericardial effusion is present.    CT Head:  No acute intracranial pathology.    CT L Spine:   1.  No vertebral fracture identified.  2.  Mild degenerative changes of lumbar spine L4-L5 and L5-S1..    CT T Spine:   pending                 no hematuria/no flank pain L/no flank pain R/no dysuria

## 2023-10-09 NOTE — H&P PST ADULT - HISTORY OF PRESENT ILLNESS
52 yo F with h/o depression, bipolar disorder, YOLIS on CPAP left breast cancer  (was on Tamoxifen till 2021) s/p bilateral mastectomy and reconstruction in 2013 with Dr. Rivera at [facility]. Pt c/o  breast implant pain , tightness, pulling, shifting of left implant to right axilla x 3 years. Pt had surgical consult-  s/p US bilateral breast-  Had surgical consult- scheduled for revision bilateral breast reconstruction-exchange breast implants bilateral-creation of pre pectoral  pocket-repair pectoralis muscle-placement of alloderm on 10/17/23    **Denies any fever, chills, CP/SOB or sick contacts

## 2023-10-09 NOTE — H&P PST ADULT - ASSESSMENT
54 yo F scheduled for revision bilateral breast reconstruction-exchange breast implants bilateral-creation of pre pectoral  pocket-repair pectoralis muscle-placement of alloderm on 10/17/23

## 2023-10-09 NOTE — H&P PST ADULT - TOBACCO USE
Rendering provider is unavailable to complete documentation for this encounter. This note is being closed administratively.     Gayatri Amaya MD Former smoker

## 2023-10-09 NOTE — H&P PST ADULT - PROBLEM SELECTOR PLAN 1
Revision bilateral breast reconstruction-exchange breast implants bilateral-creation of pre pectoral  pocket-repair pectoralis muscle-placement of alloderm  Labs- CBC, CMP, PT/PTT, UCG, T&S  Pre op instructions discussed, verbalized understanding  Pre op PCP evaluation

## 2023-10-17 ENCOUNTER — APPOINTMENT (OUTPATIENT)
Dept: PLASTIC SURGERY | Facility: HOSPITAL | Age: 53
End: 2023-10-17

## 2023-10-25 PROBLEM — G47.33 OBSTRUCTIVE SLEEP APNEA (ADULT) (PEDIATRIC): Chronic | Status: ACTIVE | Noted: 2023-10-09

## 2023-11-28 ENCOUNTER — OUTPATIENT (OUTPATIENT)
Dept: OUTPATIENT SERVICES | Facility: HOSPITAL | Age: 53
LOS: 1 days | Discharge: HOME | End: 2023-11-28

## 2023-11-28 DIAGNOSIS — C50.919 MALIGNANT NEOPLASM OF UNSPECIFIED SITE OF UNSPECIFIED FEMALE BREAST: ICD-10-CM

## 2023-11-28 DIAGNOSIS — Z98.890 OTHER SPECIFIED POSTPROCEDURAL STATES: Chronic | ICD-10-CM

## 2023-11-28 DIAGNOSIS — Z90.13 ACQUIRED ABSENCE OF BILATERAL BREASTS AND NIPPLES: Chronic | ICD-10-CM

## 2023-11-29 ENCOUNTER — APPOINTMENT (OUTPATIENT)
Dept: ULTRASOUND IMAGING | Facility: IMAGING CENTER | Age: 53
End: 2023-11-29
Payer: COMMERCIAL

## 2023-11-29 ENCOUNTER — APPOINTMENT (OUTPATIENT)
Dept: HEMATOLOGY ONCOLOGY | Facility: CLINIC | Age: 53
End: 2023-11-29
Payer: COMMERCIAL

## 2023-11-29 ENCOUNTER — OUTPATIENT (OUTPATIENT)
Dept: OUTPATIENT SERVICES | Facility: HOSPITAL | Age: 53
LOS: 1 days | End: 2023-11-29
Payer: COMMERCIAL

## 2023-11-29 VITALS
HEART RATE: 104 BPM | RESPIRATION RATE: 16 BRPM | WEIGHT: 152.12 LBS | TEMPERATURE: 98.1 F | BODY MASS INDEX: 25.71 KG/M2 | OXYGEN SATURATION: 99 % | SYSTOLIC BLOOD PRESSURE: 144 MMHG | DIASTOLIC BLOOD PRESSURE: 91 MMHG

## 2023-11-29 DIAGNOSIS — E01.0 IODINE-DEFICIENCY RELATED DIFFUSE (ENDEMIC) GOITER: ICD-10-CM

## 2023-11-29 DIAGNOSIS — Z90.13 ACQUIRED ABSENCE OF BILATERAL BREASTS AND NIPPLES: Chronic | ICD-10-CM

## 2023-11-29 DIAGNOSIS — Z98.890 OTHER SPECIFIED POSTPROCEDURAL STATES: Chronic | ICD-10-CM

## 2023-11-29 DIAGNOSIS — C50.919 MALIGNANT NEOPLASM OF UNSPECIFIED SITE OF UNSPECIFIED FEMALE BREAST: ICD-10-CM

## 2023-11-29 PROCEDURE — 76536 US EXAM OF HEAD AND NECK: CPT

## 2023-11-29 PROCEDURE — 76536 US EXAM OF HEAD AND NECK: CPT | Mod: 26

## 2023-11-29 PROCEDURE — 99214 OFFICE O/P EST MOD 30 MIN: CPT

## 2023-11-30 ENCOUNTER — OUTPATIENT (OUTPATIENT)
Dept: OUTPATIENT SERVICES | Facility: HOSPITAL | Age: 53
LOS: 1 days | End: 2023-11-30
Payer: COMMERCIAL

## 2023-11-30 VITALS
HEART RATE: 90 BPM | TEMPERATURE: 98 F | RESPIRATION RATE: 16 BRPM | OXYGEN SATURATION: 97 % | WEIGHT: 149.91 LBS | DIASTOLIC BLOOD PRESSURE: 80 MMHG | SYSTOLIC BLOOD PRESSURE: 134 MMHG | HEIGHT: 64 IN

## 2023-11-30 DIAGNOSIS — T85.44XS CAPSULAR CONTRACTURE OF BREAST IMPLANT, SEQUELA: ICD-10-CM

## 2023-11-30 DIAGNOSIS — F41.9 ANXIETY DISORDER, UNSPECIFIED: ICD-10-CM

## 2023-11-30 DIAGNOSIS — Z01.818 ENCOUNTER FOR OTHER PREPROCEDURAL EXAMINATION: ICD-10-CM

## 2023-11-30 DIAGNOSIS — F31.9 BIPOLAR DISORDER, UNSPECIFIED: ICD-10-CM

## 2023-11-30 DIAGNOSIS — Z98.890 OTHER SPECIFIED POSTPROCEDURAL STATES: Chronic | ICD-10-CM

## 2023-11-30 DIAGNOSIS — Z90.13 ACQUIRED ABSENCE OF BILATERAL BREASTS AND NIPPLES: ICD-10-CM

## 2023-11-30 DIAGNOSIS — C50.919 MALIGNANT NEOPLASM OF UNSPECIFIED SITE OF UNSPECIFIED FEMALE BREAST: ICD-10-CM

## 2023-11-30 DIAGNOSIS — Z90.13 ACQUIRED ABSENCE OF BILATERAL BREASTS AND NIPPLES: Chronic | ICD-10-CM

## 2023-11-30 LAB
ALBUMIN SERPL ELPH-MCNC: 4.2 G/DL — SIGNIFICANT CHANGE UP (ref 3.3–5)
ALBUMIN SERPL ELPH-MCNC: 4.2 G/DL — SIGNIFICANT CHANGE UP (ref 3.3–5)
ALP SERPL-CCNC: 74 U/L — SIGNIFICANT CHANGE UP (ref 40–120)
ALP SERPL-CCNC: 74 U/L — SIGNIFICANT CHANGE UP (ref 40–120)
ALT FLD-CCNC: 19 U/L — SIGNIFICANT CHANGE UP (ref 12–78)
ALT FLD-CCNC: 19 U/L — SIGNIFICANT CHANGE UP (ref 12–78)
ANION GAP SERPL CALC-SCNC: 8 MMOL/L — SIGNIFICANT CHANGE UP (ref 5–17)
ANION GAP SERPL CALC-SCNC: 8 MMOL/L — SIGNIFICANT CHANGE UP (ref 5–17)
APTT BLD: 33.9 SEC — SIGNIFICANT CHANGE UP (ref 24.5–35.6)
APTT BLD: 33.9 SEC — SIGNIFICANT CHANGE UP (ref 24.5–35.6)
AST SERPL-CCNC: 16 U/L — SIGNIFICANT CHANGE UP (ref 15–37)
AST SERPL-CCNC: 16 U/L — SIGNIFICANT CHANGE UP (ref 15–37)
BILIRUB SERPL-MCNC: 0.3 MG/DL — SIGNIFICANT CHANGE UP (ref 0.2–1.2)
BILIRUB SERPL-MCNC: 0.3 MG/DL — SIGNIFICANT CHANGE UP (ref 0.2–1.2)
BUN SERPL-MCNC: 23 MG/DL — SIGNIFICANT CHANGE UP (ref 7–23)
BUN SERPL-MCNC: 23 MG/DL — SIGNIFICANT CHANGE UP (ref 7–23)
CALCIUM SERPL-MCNC: 9.8 MG/DL — SIGNIFICANT CHANGE UP (ref 8.5–10.1)
CALCIUM SERPL-MCNC: 9.8 MG/DL — SIGNIFICANT CHANGE UP (ref 8.5–10.1)
CHLORIDE SERPL-SCNC: 107 MMOL/L — SIGNIFICANT CHANGE UP (ref 96–108)
CHLORIDE SERPL-SCNC: 107 MMOL/L — SIGNIFICANT CHANGE UP (ref 96–108)
CO2 SERPL-SCNC: 28 MMOL/L — SIGNIFICANT CHANGE UP (ref 22–31)
CO2 SERPL-SCNC: 28 MMOL/L — SIGNIFICANT CHANGE UP (ref 22–31)
CREAT SERPL-MCNC: 0.81 MG/DL — SIGNIFICANT CHANGE UP (ref 0.5–1.3)
CREAT SERPL-MCNC: 0.81 MG/DL — SIGNIFICANT CHANGE UP (ref 0.5–1.3)
EGFR: 87 ML/MIN/1.73M2 — SIGNIFICANT CHANGE UP
EGFR: 87 ML/MIN/1.73M2 — SIGNIFICANT CHANGE UP
GLUCOSE SERPL-MCNC: 122 MG/DL — HIGH (ref 70–99)
GLUCOSE SERPL-MCNC: 122 MG/DL — HIGH (ref 70–99)
HCT VFR BLD CALC: 41.6 % — SIGNIFICANT CHANGE UP (ref 34.5–45)
HCT VFR BLD CALC: 41.6 % — SIGNIFICANT CHANGE UP (ref 34.5–45)
HGB BLD-MCNC: 14.6 G/DL — SIGNIFICANT CHANGE UP (ref 11.5–15.5)
HGB BLD-MCNC: 14.6 G/DL — SIGNIFICANT CHANGE UP (ref 11.5–15.5)
INR BLD: 0.94 RATIO — SIGNIFICANT CHANGE UP (ref 0.85–1.18)
INR BLD: 0.94 RATIO — SIGNIFICANT CHANGE UP (ref 0.85–1.18)
MCHC RBC-ENTMCNC: 32.4 PG — SIGNIFICANT CHANGE UP (ref 27–34)
MCHC RBC-ENTMCNC: 32.4 PG — SIGNIFICANT CHANGE UP (ref 27–34)
MCHC RBC-ENTMCNC: 35.1 GM/DL — SIGNIFICANT CHANGE UP (ref 32–36)
MCHC RBC-ENTMCNC: 35.1 GM/DL — SIGNIFICANT CHANGE UP (ref 32–36)
MCV RBC AUTO: 92.4 FL — SIGNIFICANT CHANGE UP (ref 80–100)
MCV RBC AUTO: 92.4 FL — SIGNIFICANT CHANGE UP (ref 80–100)
NRBC # BLD: 0 /100 WBCS — SIGNIFICANT CHANGE UP (ref 0–0)
NRBC # BLD: 0 /100 WBCS — SIGNIFICANT CHANGE UP (ref 0–0)
PLATELET # BLD AUTO: 265 K/UL — SIGNIFICANT CHANGE UP (ref 150–400)
PLATELET # BLD AUTO: 265 K/UL — SIGNIFICANT CHANGE UP (ref 150–400)
POTASSIUM SERPL-MCNC: 4.5 MMOL/L — SIGNIFICANT CHANGE UP (ref 3.5–5.3)
POTASSIUM SERPL-MCNC: 4.5 MMOL/L — SIGNIFICANT CHANGE UP (ref 3.5–5.3)
POTASSIUM SERPL-SCNC: 4.5 MMOL/L — SIGNIFICANT CHANGE UP (ref 3.5–5.3)
POTASSIUM SERPL-SCNC: 4.5 MMOL/L — SIGNIFICANT CHANGE UP (ref 3.5–5.3)
PROT SERPL-MCNC: 7.8 G/DL — SIGNIFICANT CHANGE UP (ref 6–8.3)
PROT SERPL-MCNC: 7.8 G/DL — SIGNIFICANT CHANGE UP (ref 6–8.3)
PROTHROM AB SERPL-ACNC: 11 SEC — SIGNIFICANT CHANGE UP (ref 9.5–13)
PROTHROM AB SERPL-ACNC: 11 SEC — SIGNIFICANT CHANGE UP (ref 9.5–13)
RBC # BLD: 4.5 M/UL — SIGNIFICANT CHANGE UP (ref 3.8–5.2)
RBC # BLD: 4.5 M/UL — SIGNIFICANT CHANGE UP (ref 3.8–5.2)
RBC # FLD: 12.9 % — SIGNIFICANT CHANGE UP (ref 10.3–14.5)
RBC # FLD: 12.9 % — SIGNIFICANT CHANGE UP (ref 10.3–14.5)
SODIUM SERPL-SCNC: 143 MMOL/L — SIGNIFICANT CHANGE UP (ref 135–145)
SODIUM SERPL-SCNC: 143 MMOL/L — SIGNIFICANT CHANGE UP (ref 135–145)
WBC # BLD: 9.2 K/UL — SIGNIFICANT CHANGE UP (ref 3.8–10.5)
WBC # BLD: 9.2 K/UL — SIGNIFICANT CHANGE UP (ref 3.8–10.5)
WBC # FLD AUTO: 9.2 K/UL — SIGNIFICANT CHANGE UP (ref 3.8–10.5)
WBC # FLD AUTO: 9.2 K/UL — SIGNIFICANT CHANGE UP (ref 3.8–10.5)

## 2023-11-30 PROCEDURE — 86901 BLOOD TYPING SEROLOGIC RH(D): CPT

## 2023-11-30 PROCEDURE — 80053 COMPREHEN METABOLIC PANEL: CPT

## 2023-11-30 PROCEDURE — 36415 COLL VENOUS BLD VENIPUNCTURE: CPT

## 2023-11-30 PROCEDURE — 85027 COMPLETE CBC AUTOMATED: CPT

## 2023-11-30 PROCEDURE — 86850 RBC ANTIBODY SCREEN: CPT

## 2023-11-30 PROCEDURE — G0463: CPT

## 2023-11-30 PROCEDURE — 85730 THROMBOPLASTIN TIME PARTIAL: CPT

## 2023-11-30 PROCEDURE — 86900 BLOOD TYPING SEROLOGIC ABO: CPT

## 2023-11-30 PROCEDURE — 85610 PROTHROMBIN TIME: CPT

## 2023-11-30 RX ORDER — VENLAFAXINE HCL 75 MG
1 CAPSULE, EXT RELEASE 24 HR ORAL
Qty: 0 | Refills: 0 | DISCHARGE

## 2023-11-30 NOTE — H&P PST ADULT - PROBLEM SELECTOR PLAN 1
Revision bilateral breast reconstruction-exchange breast implants bilateral-creation of pre pectoral  pocket-repair pectoralis muscle-placement of alloderm on12/12/23 .   Pre op instructions discussed  Labs- CBC, CMP, PT/PTT, T&S, EKG in chart

## 2023-11-30 NOTE — H&P PST ADULT - NSICDXPASTMEDICALHX_GEN_ALL_CORE_FT
PAST MEDICAL HISTORY:  2019 novel coronavirus disease (COVID-19) Feb 2021 , did not need to be hospitalized.    Anxiety 1/14/22 ; IN PST ; pt denies h/o anxiety    Breast cancer Left s/p Bilateral Mastectomy  ; tx Tamoxifen x 8 years    Breast cancer, left     Depression Bipolar    Endometrial thickening on ultrasound     Migraine headache     YOLIS on CPAP     Uterine polyp

## 2023-11-30 NOTE — H&P PST ADULT - HISTORY OF PRESENT ILLNESS
52 yo F with h/o depression, bipolar disorder, YOLIS on CPAP, left breast cancer  (was on Tamoxifen till 2021) s/p bilateral mastectomy and reconstruction in 2013 with Dr. Boggs at [facility]. Pt c/o  breast implant pain , tightness, pulling, shifting of left implant to right axilla x 3 years. Pt had surgical consult-  s/p US bilateral breast-  Had surgical consult- scheduled for revision bilateral breast reconstruction-exchange breast implants bilateral-creation of pre pectoral  pocket-repair pectoralis muscle-placement of alloderm on12/12/23 . Surgery was cancelled on 10/17/23 due to Covid infection  **Denies any fever, chills, CP/SOB or sick contacts 54 yo F with h/o depression, bipolar disorder, YOLIS on CPAP, left breast cancer  (was on Tamoxifen till 2021) s/p bilateral mastectomy and reconstruction in 2013 with Dr. Boggs at [facility]. Pt c/o  breast implant pain , tightness, pulling, shifting of left implant to right axilla x 3 years. Pt had surgical consult-  s/p US bilateral breast-  Had surgical consult- scheduled for revision bilateral breast reconstruction-exchange breast implants bilateral-creation of pre pectoral  pocket-repair pectoralis muscle-placement of alloderm on12/12/23 . Surgery was cancelled on 10/17/23 due to Covid infection  **Denies any fever, chills, CP/SOB or sick contacts 52 yo F with h/o depression, bipolar disorder, YOLIS on CPAP, left breast cancer  (was on Tamoxifen till 2021) s/p bilateral mastectomy and reconstruction in 2013 with Dr. Boggs at [facility]. Pt c/o  breast implant pain , tightness, pulling, shifting of left implant to right axilla x 3 years. Pt had surgical consult-  s/p US bilateral breast-  Had surgical consult- scheduled for revision bilateral breast reconstruction-exchange breast implants bilateral-creation of pre pectoral  pocket-repair pectoralis muscle-placement of alloderm on12/12/23 . Surgery was cancelled on 10/17/23 due to Covid infection  **Denies any fever, chills, CP/SOB or sick contacts

## 2023-11-30 NOTE — H&P PST ADULT - NS PRO FEM REPRO PAP RESULTS
ANNE Turner: labs/UA, head CT results reviewed, no acute findings. Patient reports she feels better, her dizziness is resolved, she is tolerating PO in the ED. Patient stable for d/c normal

## 2023-11-30 NOTE — H&P PST ADULT - NS PRO LAST MENSTRUAL DATE
[facility] 2018 [facility] 2018 [facility] 2018

## 2023-11-30 NOTE — H&P PST ADULT - ASSESSMENT
52 yo F scheduled for revision bilateral breast reconstruction-exchange breast implants bilateral-creation of pre pectoral  pocket-repair pectoralis muscle-placement of alloderm on12/12/23 .  54 yo F scheduled for revision bilateral breast reconstruction-exchange breast implants bilateral-creation of pre pectoral  pocket-repair pectoralis muscle-placement of alloderm on12/12/23 .

## 2023-12-01 ENCOUNTER — NON-APPOINTMENT (OUTPATIENT)
Age: 53
End: 2023-12-01

## 2023-12-08 RX ORDER — ONDANSETRON 4 MG/1
4 TABLET, ORALLY DISINTEGRATING ORAL
Qty: 9 | Refills: 0 | Status: ACTIVE | COMMUNITY
Start: 2023-12-08 | End: 1900-01-01

## 2023-12-11 ENCOUNTER — TRANSCRIPTION ENCOUNTER (OUTPATIENT)
Age: 53
End: 2023-12-11

## 2023-12-11 NOTE — ASU PATIENT PROFILE, ADULT - FALL HARM RISK - UNIVERSAL INTERVENTIONS
Bed in lowest position, wheels locked, appropriate side rails in place/Call bell, personal items and telephone in reach/Instruct patient to call for assistance before getting out of bed or chair/Non-slip footwear when patient is out of bed/[place] to call system/Physically safe environment - no spills, clutter or unnecessary equipment/Purposeful Proactive Rounding/Room/bathroom lighting operational, light cord in reach Bed in lowest position, wheels locked, appropriate side rails in place/Call bell, personal items and telephone in reach/Instruct patient to call for assistance before getting out of bed or chair/Non-slip footwear when patient is out of bed/[place] to call system/Physically safe environment - no spills, clutter or unnecessary equipment/Purposeful Proactive Rounding/Room/bathroom lighting operational, light cord in reach

## 2023-12-11 NOTE — ASU PATIENT PROFILE, ADULT - ARRIVAL TIME
06:30 Advancement-Rotation Flap Text: The defect edges were debeveled with a #15 scalpel blade.  Given the location of the defect, shape of the defect and the proximity to free margins an advancement-rotation flap was deemed most appropriate.  Using a sterile surgical marker, an appropriate flap was drawn incorporating the defect and placing the expected incisions within the relaxed skin tension lines where possible. The area thus outlined was incised deep to adipose tissue with a #15 scalpel blade.  The skin margins were undermined to an appropriate distance in all directions utilizing iris scissors.

## 2023-12-11 NOTE — ASU PATIENT PROFILE, ADULT - VISION (WITH CORRECTIVE LENSES IF THE PATIENT USUALLY WEARS THEM):
Wear flasses/Partially impaired: cannot see medication labels or newsprint, but can see obstacles in path, and the surrounding layout; can count fingers at arm's length

## 2023-12-12 ENCOUNTER — TRANSCRIPTION ENCOUNTER (OUTPATIENT)
Age: 53
End: 2023-12-12

## 2023-12-12 ENCOUNTER — APPOINTMENT (OUTPATIENT)
Dept: PLASTIC SURGERY | Facility: HOSPITAL | Age: 53
End: 2023-12-12

## 2023-12-12 ENCOUNTER — OUTPATIENT (OUTPATIENT)
Dept: OUTPATIENT SERVICES | Facility: HOSPITAL | Age: 53
LOS: 1 days | End: 2023-12-12
Payer: COMMERCIAL

## 2023-12-12 VITALS
RESPIRATION RATE: 15 BRPM | SYSTOLIC BLOOD PRESSURE: 130 MMHG | HEART RATE: 82 BPM | DIASTOLIC BLOOD PRESSURE: 65 MMHG | TEMPERATURE: 99 F | OXYGEN SATURATION: 98 %

## 2023-12-12 VITALS
TEMPERATURE: 98 F | HEART RATE: 65 BPM | DIASTOLIC BLOOD PRESSURE: 52 MMHG | OXYGEN SATURATION: 97 % | WEIGHT: 149.91 LBS | HEIGHT: 64 IN | RESPIRATION RATE: 14 BRPM | SYSTOLIC BLOOD PRESSURE: 128 MMHG

## 2023-12-12 DIAGNOSIS — Z90.13 ACQUIRED ABSENCE OF BILATERAL BREASTS AND NIPPLES: Chronic | ICD-10-CM

## 2023-12-12 DIAGNOSIS — Z98.890 OTHER SPECIFIED POSTPROCEDURAL STATES: Chronic | ICD-10-CM

## 2023-12-12 DIAGNOSIS — T85.44XS CAPSULAR CONTRACTURE OF BREAST IMPLANT, SEQUELA: ICD-10-CM

## 2023-12-12 DIAGNOSIS — Z90.13 ACQUIRED ABSENCE OF BILATERAL BREASTS AND NIPPLES: ICD-10-CM

## 2023-12-12 DIAGNOSIS — C50.919 MALIGNANT NEOPLASM OF UNSPECIFIED SITE OF UNSPECIFIED FEMALE BREAST: ICD-10-CM

## 2023-12-12 PROCEDURE — 88300 SURGICAL PATH GROSS: CPT | Mod: 26,59

## 2023-12-12 PROCEDURE — 99261: CPT

## 2023-12-12 PROCEDURE — 19342 INSJ/RPLCMT BRST IMPLT SEP D: CPT | Mod: 50

## 2023-12-12 PROCEDURE — 88304 TISSUE EXAM BY PATHOLOGIST: CPT

## 2023-12-12 PROCEDURE — 88304 TISSUE EXAM BY PATHOLOGIST: CPT | Mod: 26

## 2023-12-12 PROCEDURE — 19380 REVJ RECONSTRUCTED BREAST: CPT | Mod: XS,50

## 2023-12-12 PROCEDURE — 19330 RMVL RUPTURED BREAST IMPLANT: CPT | Mod: 22,RT

## 2023-12-12 PROCEDURE — 88300 SURGICAL PATH GROSS: CPT

## 2023-12-12 PROCEDURE — 19330 RMVL RUPTURED BREAST IMPLANT: CPT | Mod: RT

## 2023-12-12 PROCEDURE — 15777 ACELLULAR DERM MATRIX IMPLT: CPT | Mod: RT

## 2023-12-12 PROCEDURE — 19328 RMVL INTACT BREAST IMPLANT: CPT | Mod: LT

## 2023-12-12 PROCEDURE — 19371 PERI-IMPLT CAPSLC BRST COMPL: CPT | Mod: 59,RT

## 2023-12-12 PROCEDURE — C1789: CPT

## 2023-12-12 PROCEDURE — C9399: CPT

## 2023-12-12 DEVICE — IMPLANTABLE DEVICE: Type: IMPLANTABLE DEVICE | Status: FUNCTIONAL

## 2023-12-12 RX ORDER — BUPIVACAINE 13.3 MG/ML
20 INJECTION, SUSPENSION, LIPOSOMAL INFILTRATION ONCE
Refills: 0 | Status: DISCONTINUED | OUTPATIENT
Start: 2023-12-12 | End: 2023-12-26

## 2023-12-12 RX ORDER — OXYCODONE HYDROCHLORIDE 5 MG/1
10 TABLET ORAL DAILY
Refills: 0 | Status: DISCONTINUED | OUTPATIENT
Start: 2023-12-12 | End: 2023-12-12

## 2023-12-12 RX ORDER — SODIUM CHLORIDE 9 MG/ML
1000 INJECTION, SOLUTION INTRAVENOUS
Refills: 0 | Status: DISCONTINUED | OUTPATIENT
Start: 2023-12-12 | End: 2023-12-12

## 2023-12-12 RX ORDER — OXYCODONE HYDROCHLORIDE 5 MG/1
10 TABLET ORAL ONCE
Refills: 0 | Status: DISCONTINUED | OUTPATIENT
Start: 2023-12-12 | End: 2023-12-12

## 2023-12-12 RX ORDER — HYDROMORPHONE HYDROCHLORIDE 2 MG/ML
0.5 INJECTION INTRAMUSCULAR; INTRAVENOUS; SUBCUTANEOUS
Refills: 0 | Status: DISCONTINUED | OUTPATIENT
Start: 2023-12-12 | End: 2023-12-12

## 2023-12-12 RX ORDER — OXYCODONE HYDROCHLORIDE 5 MG/1
5 TABLET ORAL ONCE
Refills: 0 | Status: DISCONTINUED | OUTPATIENT
Start: 2023-12-12 | End: 2023-12-12

## 2023-12-12 RX ORDER — ACETAMINOPHEN 500 MG
650 TABLET ORAL ONCE
Refills: 0 | Status: DISCONTINUED | OUTPATIENT
Start: 2023-12-12 | End: 2023-12-26

## 2023-12-12 RX ORDER — CEFAZOLIN SODIUM 1 G
2000 VIAL (EA) INJECTION ONCE
Refills: 0 | Status: COMPLETED | OUTPATIENT
Start: 2023-12-12 | End: 2023-12-12

## 2023-12-12 RX ORDER — ONDANSETRON 8 MG/1
4 TABLET, FILM COATED ORAL ONCE
Refills: 0 | Status: DISCONTINUED | OUTPATIENT
Start: 2023-12-12 | End: 2023-12-12

## 2023-12-12 RX ADMIN — SODIUM CHLORIDE 75 MILLILITER(S): 9 INJECTION, SOLUTION INTRAVENOUS at 07:39

## 2023-12-12 NOTE — BRIEF OPERATIVE NOTE - NSICDXBRIEFPREOP_GEN_ALL_CORE_FT
PRE-OP DIAGNOSIS:  Acquired absence of both breasts and nipples 12-Dec-2023 12:42:19  Alvina Holland  History of breast cancer 12-Dec-2023 12:42:26  Alvina Holland

## 2023-12-12 NOTE — ASU DISCHARGE PLAN (ADULT/PEDIATRIC) - NS MD DC FALL RISK RISK
For information on Fall & Injury Prevention, visit: [email or web address] OR  [email or web address] [place]  https://www.cdc.gov/steadi/patient.html For information on Fall & Injury Prevention, visit: [email or web address] [place]  [email or web address] [place]  https://www.cdc.gov/steadi/patient.html

## 2023-12-12 NOTE — BRIEF OPERATIVE NOTE - NSICDXBRIEFPOSTOP_GEN_ALL_CORE_FT
POST-OP DIAGNOSIS:  Acquired absence of both breasts and nipples 12-Dec-2023 12:42:37  Alvina Holland  History of breast cancer 12-Dec-2023 12:42:49  Alvina Holland

## 2023-12-12 NOTE — ASU DISCHARGE PLAN (ADULT/PEDIATRIC) - ASU DC SPECIAL INSTRUCTIONSFT
Keep incisions clean and dry for 48hrs. May shower after 48hrs  Keep back to shower water. Replace bra after showering  Keep surgical bra in place at all other times  No heavy lifting or straining Keep incisions clean and dry until seen in the office. Sponge bathe only  Keep surgical bra in place at all other times  No heavy lifting or straining  Empty and record drain outputs twice a day

## 2023-12-12 NOTE — ASU DISCHARGE PLAN (ADULT/PEDIATRIC) - BATHING
Keep incisions clean and dry for 48hrs. May shower after 48hrs. Keep back to shower water Keep incisions clean and dry until seen in the office. Sponge bathe only

## 2023-12-12 NOTE — ASU DISCHARGE PLAN (ADULT/PEDIATRIC) - PROCEDURE
Revision of bilateral breast reconstruction, exchange of bilateral breast implants Revision of bilateral breast reconstruction, exchange of bilateral breast implants, bilateral capsulectomy, repair of bilateral pectoralis muscle, creation of prepectoral pocket, placement of right alloderm

## 2023-12-12 NOTE — ASU DISCHARGE PLAN (ADULT/PEDIATRIC) - CARE PROVIDER_API CALL
Shikowitz-Behr, [facility]  Plastic Surgery  [address] [place] [id number]  Phone: [phone]  Fax: [phone]  Follow Up Time:    Shikowitz-Behr, [facility]  Plastic Surgery  [address] [place] [id number]  Phone: [phone]  Fax: [phone]  Follow Up Time:

## 2023-12-12 NOTE — BRIEF OPERATIVE NOTE - OPERATION/FINDINGS
Revision of bilateral breast reconstruction, exchange of breast implants, creation of prepectoral pocket, repair of bilateral pectoralis muscle, bilateral capsulectomy, placement of right alloderm

## 2023-12-18 ENCOUNTER — APPOINTMENT (OUTPATIENT)
Dept: PLASTIC SURGERY | Facility: CLINIC | Age: 53
End: 2023-12-18
Payer: COMMERCIAL

## 2023-12-18 PROBLEM — C50.912 MALIGNANT NEOPLASM OF UNSPECIFIED SITE OF LEFT FEMALE BREAST: Chronic | Status: ACTIVE | Noted: 2023-11-30

## 2023-12-18 PROCEDURE — 99024 POSTOP FOLLOW-UP VISIT: CPT

## 2023-12-18 NOTE — REVIEW OF SYSTEMS
Normal thyroid, no dm. Lipids elevated, would continue to try to work on weight management to avoid risk of cardiovascular disease in future. [As Noted in HPI] : as noted in HPI [Negative] : Heme/Lymph

## 2023-12-22 ENCOUNTER — APPOINTMENT (OUTPATIENT)
Dept: PLASTIC SURGERY | Facility: CLINIC | Age: 53
End: 2023-12-22
Payer: COMMERCIAL

## 2023-12-22 DIAGNOSIS — T85.49XA OTHER MECHANICAL COMPLICATION OF BREAST PROSTHESIS AND IMPLANT, INITIAL ENCOUNTER: ICD-10-CM

## 2023-12-22 PROCEDURE — 99024 POSTOP FOLLOW-UP VISIT: CPT

## 2023-12-27 ENCOUNTER — APPOINTMENT (OUTPATIENT)
Dept: PLASTIC SURGERY | Facility: CLINIC | Age: 53
End: 2023-12-27

## 2023-12-28 ENCOUNTER — RESULT REVIEW (OUTPATIENT)
Age: 53
End: 2023-12-28

## 2023-12-28 ENCOUNTER — APPOINTMENT (OUTPATIENT)
Dept: PLASTIC SURGERY | Facility: CLINIC | Age: 53
End: 2023-12-28
Payer: COMMERCIAL

## 2023-12-28 ENCOUNTER — APPOINTMENT (OUTPATIENT)
Dept: ENDOCRINOLOGY | Facility: CLINIC | Age: 53
End: 2023-12-28

## 2023-12-28 PROCEDURE — 99024 POSTOP FOLLOW-UP VISIT: CPT

## 2023-12-29 ENCOUNTER — APPOINTMENT (OUTPATIENT)
Dept: PLASTIC SURGERY | Facility: CLINIC | Age: 53
End: 2023-12-29
Payer: COMMERCIAL

## 2023-12-29 ENCOUNTER — INPATIENT (INPATIENT)
Facility: HOSPITAL | Age: 53
LOS: 3 days | Discharge: HOME | DRG: 863 | End: 2024-01-02
Attending: INTERNAL MEDICINE | Admitting: FAMILY MEDICINE
Payer: COMMERCIAL

## 2023-12-29 ENCOUNTER — OUTPATIENT (OUTPATIENT)
Dept: OUTPATIENT SERVICES | Facility: HOSPITAL | Age: 53
LOS: 1 days | End: 2023-12-29
Payer: COMMERCIAL

## 2023-12-29 ENCOUNTER — APPOINTMENT (OUTPATIENT)
Dept: ULTRASOUND IMAGING | Facility: IMAGING CENTER | Age: 53
End: 2023-12-29
Payer: COMMERCIAL

## 2023-12-29 ENCOUNTER — RESULT REVIEW (OUTPATIENT)
Age: 53
End: 2023-12-29

## 2023-12-29 VITALS
DIASTOLIC BLOOD PRESSURE: 57 MMHG | SYSTOLIC BLOOD PRESSURE: 98 MMHG | TEMPERATURE: 99 F | WEIGHT: 145.95 LBS | RESPIRATION RATE: 18 BRPM | HEART RATE: 82 BPM | HEIGHT: 64 IN | OXYGEN SATURATION: 99 %

## 2023-12-29 DIAGNOSIS — T81.49XA INFECTION FOLLOWING A PROCEDURE, OTHER SURGICAL SITE, INITIAL ENCOUNTER: ICD-10-CM

## 2023-12-29 DIAGNOSIS — N61.0 MASTITIS WITHOUT ABSCESS: ICD-10-CM

## 2023-12-29 DIAGNOSIS — Z90.13 ACQUIRED ABSENCE OF BILATERAL BREASTS AND NIPPLES: Chronic | ICD-10-CM

## 2023-12-29 DIAGNOSIS — Z98.890 OTHER SPECIFIED POSTPROCEDURAL STATES: Chronic | ICD-10-CM

## 2023-12-29 LAB
ALBUMIN SERPL ELPH-MCNC: 3.1 G/DL — LOW (ref 3.3–5)
ALBUMIN SERPL ELPH-MCNC: 3.1 G/DL — LOW (ref 3.3–5)
ALP SERPL-CCNC: 55 U/L — SIGNIFICANT CHANGE UP (ref 40–120)
ALP SERPL-CCNC: 55 U/L — SIGNIFICANT CHANGE UP (ref 40–120)
ALT FLD-CCNC: 14 U/L — SIGNIFICANT CHANGE UP (ref 10–45)
ALT FLD-CCNC: 14 U/L — SIGNIFICANT CHANGE UP (ref 10–45)
ANION GAP SERPL CALC-SCNC: 6 MMOL/L — SIGNIFICANT CHANGE UP (ref 5–17)
ANION GAP SERPL CALC-SCNC: 6 MMOL/L — SIGNIFICANT CHANGE UP (ref 5–17)
APTT BLD: 31.3 SEC — SIGNIFICANT CHANGE UP (ref 24.5–35.6)
APTT BLD: 31.3 SEC — SIGNIFICANT CHANGE UP (ref 24.5–35.6)
AST SERPL-CCNC: 12 U/L — SIGNIFICANT CHANGE UP (ref 10–40)
AST SERPL-CCNC: 12 U/L — SIGNIFICANT CHANGE UP (ref 10–40)
BASOPHILS # BLD AUTO: 0.09 K/UL — SIGNIFICANT CHANGE UP (ref 0–0.2)
BASOPHILS # BLD AUTO: 0.09 K/UL — SIGNIFICANT CHANGE UP (ref 0–0.2)
BASOPHILS NFR BLD AUTO: 0.5 % — SIGNIFICANT CHANGE UP (ref 0–2)
BASOPHILS NFR BLD AUTO: 0.5 % — SIGNIFICANT CHANGE UP (ref 0–2)
BILIRUB SERPL-MCNC: 0.6 MG/DL — SIGNIFICANT CHANGE UP (ref 0.2–1.2)
BILIRUB SERPL-MCNC: 0.6 MG/DL — SIGNIFICANT CHANGE UP (ref 0.2–1.2)
BUN SERPL-MCNC: 12 MG/DL — SIGNIFICANT CHANGE UP (ref 7–23)
BUN SERPL-MCNC: 12 MG/DL — SIGNIFICANT CHANGE UP (ref 7–23)
CALCIUM SERPL-MCNC: 9.1 MG/DL — SIGNIFICANT CHANGE UP (ref 8.4–10.5)
CALCIUM SERPL-MCNC: 9.1 MG/DL — SIGNIFICANT CHANGE UP (ref 8.4–10.5)
CHLORIDE SERPL-SCNC: 98 MMOL/L — SIGNIFICANT CHANGE UP (ref 96–108)
CHLORIDE SERPL-SCNC: 98 MMOL/L — SIGNIFICANT CHANGE UP (ref 96–108)
CO2 SERPL-SCNC: 28 MMOL/L — SIGNIFICANT CHANGE UP (ref 22–31)
CO2 SERPL-SCNC: 28 MMOL/L — SIGNIFICANT CHANGE UP (ref 22–31)
CREAT SERPL-MCNC: 0.72 MG/DL — SIGNIFICANT CHANGE UP (ref 0.5–1.3)
CREAT SERPL-MCNC: 0.72 MG/DL — SIGNIFICANT CHANGE UP (ref 0.5–1.3)
EGFR: 101 ML/MIN/1.73M2 — SIGNIFICANT CHANGE UP
EGFR: 101 ML/MIN/1.73M2 — SIGNIFICANT CHANGE UP
EOSINOPHIL # BLD AUTO: 0.1 K/UL — SIGNIFICANT CHANGE UP (ref 0–0.5)
EOSINOPHIL # BLD AUTO: 0.1 K/UL — SIGNIFICANT CHANGE UP (ref 0–0.5)
EOSINOPHIL NFR BLD AUTO: 0.5 % — SIGNIFICANT CHANGE UP (ref 0–6)
EOSINOPHIL NFR BLD AUTO: 0.5 % — SIGNIFICANT CHANGE UP (ref 0–6)
GLUCOSE SERPL-MCNC: 112 MG/DL — HIGH (ref 70–99)
GLUCOSE SERPL-MCNC: 112 MG/DL — HIGH (ref 70–99)
HCT VFR BLD CALC: 34.2 % — LOW (ref 34.5–45)
HCT VFR BLD CALC: 34.2 % — LOW (ref 34.5–45)
HGB BLD-MCNC: 12.3 G/DL — SIGNIFICANT CHANGE UP (ref 11.5–15.5)
HGB BLD-MCNC: 12.3 G/DL — SIGNIFICANT CHANGE UP (ref 11.5–15.5)
IMM GRANULOCYTES NFR BLD AUTO: 0.7 % — SIGNIFICANT CHANGE UP (ref 0–0.9)
IMM GRANULOCYTES NFR BLD AUTO: 0.7 % — SIGNIFICANT CHANGE UP (ref 0–0.9)
INR BLD: 1.09 RATIO — SIGNIFICANT CHANGE UP (ref 0.85–1.18)
INR BLD: 1.09 RATIO — SIGNIFICANT CHANGE UP (ref 0.85–1.18)
LACTATE SERPL-SCNC: 1.3 MMOL/L — SIGNIFICANT CHANGE UP (ref 0.7–2)
LACTATE SERPL-SCNC: 1.3 MMOL/L — SIGNIFICANT CHANGE UP (ref 0.7–2)
LYMPHOCYTES # BLD AUTO: 16.9 % — SIGNIFICANT CHANGE UP (ref 13–44)
LYMPHOCYTES # BLD AUTO: 16.9 % — SIGNIFICANT CHANGE UP (ref 13–44)
LYMPHOCYTES # BLD AUTO: 3.26 K/UL — SIGNIFICANT CHANGE UP (ref 1–3.3)
LYMPHOCYTES # BLD AUTO: 3.26 K/UL — SIGNIFICANT CHANGE UP (ref 1–3.3)
MCHC RBC-ENTMCNC: 32.8 PG — SIGNIFICANT CHANGE UP (ref 27–34)
MCHC RBC-ENTMCNC: 32.8 PG — SIGNIFICANT CHANGE UP (ref 27–34)
MCHC RBC-ENTMCNC: 36 GM/DL — SIGNIFICANT CHANGE UP (ref 32–36)
MCHC RBC-ENTMCNC: 36 GM/DL — SIGNIFICANT CHANGE UP (ref 32–36)
MCV RBC AUTO: 91.2 FL — SIGNIFICANT CHANGE UP (ref 80–100)
MCV RBC AUTO: 91.2 FL — SIGNIFICANT CHANGE UP (ref 80–100)
MONOCYTES # BLD AUTO: 2.19 K/UL — HIGH (ref 0–0.9)
MONOCYTES # BLD AUTO: 2.19 K/UL — HIGH (ref 0–0.9)
MONOCYTES NFR BLD AUTO: 11.3 % — SIGNIFICANT CHANGE UP (ref 2–14)
MONOCYTES NFR BLD AUTO: 11.3 % — SIGNIFICANT CHANGE UP (ref 2–14)
NEUTROPHILS # BLD AUTO: 13.53 K/UL — HIGH (ref 1.8–7.4)
NEUTROPHILS # BLD AUTO: 13.53 K/UL — HIGH (ref 1.8–7.4)
NEUTROPHILS NFR BLD AUTO: 70.1 % — SIGNIFICANT CHANGE UP (ref 43–77)
NEUTROPHILS NFR BLD AUTO: 70.1 % — SIGNIFICANT CHANGE UP (ref 43–77)
NRBC # BLD: 0 /100 WBCS — SIGNIFICANT CHANGE UP (ref 0–0)
NRBC # BLD: 0 /100 WBCS — SIGNIFICANT CHANGE UP (ref 0–0)
PLATELET # BLD AUTO: 215 K/UL — SIGNIFICANT CHANGE UP (ref 150–400)
PLATELET # BLD AUTO: 215 K/UL — SIGNIFICANT CHANGE UP (ref 150–400)
POTASSIUM SERPL-MCNC: 4 MMOL/L — SIGNIFICANT CHANGE UP (ref 3.5–5.3)
POTASSIUM SERPL-MCNC: 4 MMOL/L — SIGNIFICANT CHANGE UP (ref 3.5–5.3)
POTASSIUM SERPL-SCNC: 4 MMOL/L — SIGNIFICANT CHANGE UP (ref 3.5–5.3)
POTASSIUM SERPL-SCNC: 4 MMOL/L — SIGNIFICANT CHANGE UP (ref 3.5–5.3)
PROT SERPL-MCNC: 6.6 G/DL — SIGNIFICANT CHANGE UP (ref 6–8.3)
PROT SERPL-MCNC: 6.6 G/DL — SIGNIFICANT CHANGE UP (ref 6–8.3)
PROTHROM AB SERPL-ACNC: 12.7 SEC — SIGNIFICANT CHANGE UP (ref 9.5–13)
PROTHROM AB SERPL-ACNC: 12.7 SEC — SIGNIFICANT CHANGE UP (ref 9.5–13)
RBC # BLD: 3.75 M/UL — LOW (ref 3.8–5.2)
RBC # BLD: 3.75 M/UL — LOW (ref 3.8–5.2)
RBC # FLD: 12.6 % — SIGNIFICANT CHANGE UP (ref 10.3–14.5)
RBC # FLD: 12.6 % — SIGNIFICANT CHANGE UP (ref 10.3–14.5)
SODIUM SERPL-SCNC: 132 MMOL/L — LOW (ref 135–145)
SODIUM SERPL-SCNC: 132 MMOL/L — LOW (ref 135–145)
WBC # BLD: 19.3 K/UL — HIGH (ref 3.8–10.5)
WBC # BLD: 19.3 K/UL — HIGH (ref 3.8–10.5)
WBC # FLD AUTO: 19.3 K/UL — HIGH (ref 3.8–10.5)
WBC # FLD AUTO: 19.3 K/UL — HIGH (ref 3.8–10.5)

## 2023-12-29 PROCEDURE — 76641 ULTRASOUND BREAST COMPLETE: CPT

## 2023-12-29 PROCEDURE — 76641 ULTRASOUND BREAST COMPLETE: CPT | Mod: 26,RT

## 2023-12-29 PROCEDURE — 99285 EMERGENCY DEPT VISIT HI MDM: CPT

## 2023-12-29 PROCEDURE — 99024 POSTOP FOLLOW-UP VISIT: CPT

## 2023-12-29 PROCEDURE — 93010 ELECTROCARDIOGRAM REPORT: CPT

## 2023-12-29 RX ORDER — VANCOMYCIN HCL 1 G
1000 VIAL (EA) INTRAVENOUS ONCE
Refills: 0 | Status: COMPLETED | OUTPATIENT
Start: 2023-12-29 | End: 2023-12-29

## 2023-12-29 RX ORDER — SODIUM CHLORIDE 9 MG/ML
1000 INJECTION INTRAMUSCULAR; INTRAVENOUS; SUBCUTANEOUS
Refills: 0 | Status: DISCONTINUED | OUTPATIENT
Start: 2023-12-29 | End: 2023-12-30

## 2023-12-29 RX ORDER — ONDANSETRON 8 MG/1
4 TABLET, FILM COATED ORAL EVERY 6 HOURS
Refills: 0 | Status: DISCONTINUED | OUTPATIENT
Start: 2023-12-29 | End: 2024-01-02

## 2023-12-29 RX ORDER — ACETAMINOPHEN 500 MG
650 TABLET ORAL EVERY 6 HOURS
Refills: 0 | Status: DISCONTINUED | OUTPATIENT
Start: 2023-12-29 | End: 2024-01-02

## 2023-12-29 RX ORDER — DIVALPROEX SODIUM 500 MG/1
1250 TABLET, DELAYED RELEASE ORAL AT BEDTIME
Refills: 0 | Status: DISCONTINUED | OUTPATIENT
Start: 2023-12-29 | End: 2024-01-02

## 2023-12-29 RX ORDER — PIPERACILLIN AND TAZOBACTAM 4; .5 G/20ML; G/20ML
3.38 INJECTION, POWDER, LYOPHILIZED, FOR SOLUTION INTRAVENOUS ONCE
Refills: 0 | Status: COMPLETED | OUTPATIENT
Start: 2023-12-29 | End: 2023-12-29

## 2023-12-29 RX ORDER — ACETAMINOPHEN 500 MG
650 TABLET ORAL EVERY 6 HOURS
Refills: 0 | Status: DISCONTINUED | OUTPATIENT
Start: 2023-12-29 | End: 2023-12-29

## 2023-12-29 RX ORDER — LAMOTRIGINE 25 MG/1
200 TABLET, ORALLY DISINTEGRATING ORAL AT BEDTIME
Refills: 0 | Status: DISCONTINUED | OUTPATIENT
Start: 2023-12-29 | End: 2024-01-02

## 2023-12-29 RX ORDER — PIPERACILLIN AND TAZOBACTAM 4; .5 G/20ML; G/20ML
3.38 INJECTION, POWDER, LYOPHILIZED, FOR SOLUTION INTRAVENOUS EVERY 8 HOURS
Refills: 0 | Status: DISCONTINUED | OUTPATIENT
Start: 2023-12-30 | End: 2024-01-02

## 2023-12-29 RX ORDER — SODIUM CHLORIDE 9 MG/ML
1000 INJECTION INTRAMUSCULAR; INTRAVENOUS; SUBCUTANEOUS ONCE
Refills: 0 | Status: COMPLETED | OUTPATIENT
Start: 2023-12-29 | End: 2023-12-29

## 2023-12-29 RX ORDER — ONDANSETRON 8 MG/1
4 TABLET, FILM COATED ORAL ONCE
Refills: 0 | Status: COMPLETED | OUTPATIENT
Start: 2023-12-29 | End: 2023-12-29

## 2023-12-29 RX ORDER — KETOROLAC TROMETHAMINE 30 MG/ML
15 SYRINGE (ML) INJECTION ONCE
Refills: 0 | Status: DISCONTINUED | OUTPATIENT
Start: 2023-12-29 | End: 2023-12-29

## 2023-12-29 RX ORDER — VENLAFAXINE HCL 75 MG
75 CAPSULE, EXT RELEASE 24 HR ORAL
Refills: 0 | Status: DISCONTINUED | OUTPATIENT
Start: 2023-12-29 | End: 2024-01-02

## 2023-12-29 RX ORDER — POLYETHYLENE GLYCOL 3350 17 G/17G
17 POWDER, FOR SOLUTION ORAL DAILY
Refills: 0 | Status: DISCONTINUED | OUTPATIENT
Start: 2023-12-29 | End: 2024-01-01

## 2023-12-29 RX ORDER — ACETAMINOPHEN 500 MG
650 TABLET ORAL ONCE
Refills: 0 | Status: COMPLETED | OUTPATIENT
Start: 2023-12-29 | End: 2023-12-29

## 2023-12-29 RX ADMIN — PIPERACILLIN AND TAZOBACTAM 3.38 GRAM(S): 4; .5 INJECTION, POWDER, LYOPHILIZED, FOR SOLUTION INTRAVENOUS at 18:48

## 2023-12-29 RX ADMIN — ONDANSETRON 4 MILLIGRAM(S): 8 TABLET, FILM COATED ORAL at 17:26

## 2023-12-29 RX ADMIN — Medication 250 MILLIGRAM(S): at 17:26

## 2023-12-29 RX ADMIN — Medication 650 MILLIGRAM(S): at 17:05

## 2023-12-29 RX ADMIN — Medication 15 MILLIGRAM(S): at 21:56

## 2023-12-29 RX ADMIN — ONDANSETRON 4 MILLIGRAM(S): 8 TABLET, FILM COATED ORAL at 21:24

## 2023-12-29 RX ADMIN — Medication 650 MILLIGRAM(S): at 18:48

## 2023-12-29 RX ADMIN — SODIUM CHLORIDE 1000 MILLILITER(S): 9 INJECTION INTRAMUSCULAR; INTRAVENOUS; SUBCUTANEOUS at 17:25

## 2023-12-29 RX ADMIN — LAMOTRIGINE 200 MILLIGRAM(S): 25 TABLET, ORALLY DISINTEGRATING ORAL at 22:41

## 2023-12-29 RX ADMIN — Medication 15 MILLIGRAM(S): at 21:41

## 2023-12-29 RX ADMIN — DIVALPROEX SODIUM 1250 MILLIGRAM(S): 500 TABLET, DELAYED RELEASE ORAL at 22:42

## 2023-12-29 RX ADMIN — POLYETHYLENE GLYCOL 3350 17 GRAM(S): 17 POWDER, FOR SOLUTION ORAL at 22:45

## 2023-12-29 RX ADMIN — Medication 1000 MILLIGRAM(S): at 18:48

## 2023-12-29 RX ADMIN — SODIUM CHLORIDE 75 MILLILITER(S): 9 INJECTION INTRAMUSCULAR; INTRAVENOUS; SUBCUTANEOUS at 20:00

## 2023-12-29 RX ADMIN — PIPERACILLIN AND TAZOBACTAM 200 GRAM(S): 4; .5 INJECTION, POWDER, LYOPHILIZED, FOR SOLUTION INTRAVENOUS at 17:05

## 2023-12-29 NOTE — HISTORY OF PRESENT ILLNESS
[FreeTextEntry1] : Patient seen and examined in the office yesterday. Right breast drain met criteria for removal, had been ~20cc daily.  There were no concerns at the time.    Patient called the answering service this AM, stated that overnight the right breast became painful, swollen, red, and that she was febrile to 101. She was asked to present to the office for further evaluation.   On examination, the right breast was swollen and erythematous compared. this is changed from yesterday afternoon. There is also tenderness.

## 2023-12-29 NOTE — PATIENT PROFILE ADULT - FALL HARM RISK - UNIVERSAL INTERVENTIONS
Bed in lowest position, wheels locked, appropriate side rails in place/Call bell, personal items and telephone in reach/Instruct patient to call for assistance before getting out of bed or chair/Non-slip footwear when patient is out of bed/[place] to call system/Physically safe environment - no spills, clutter or unnecessary equipment/Purposeful Proactive Rounding/Room/bathroom lighting operational, light cord in reach Bed in lowest position, wheels locked, appropriate side rails in place/Call bell, personal items and telephone in reach/Instruct patient to call for assistance before getting out of bed or chair/Non-slip footwear when patient is out of bed/[place] to call system/Physically safe environment - no spills, clutter or unnecessary equipment/Purposeful Proactive Rounding/Room/bathroom lighting operational, light cord in reach

## 2023-12-29 NOTE — H&P ADULT - NSHPREVIEWOFSYSTEMS_GEN_ALL_CORE
REVIEW OF SYSTEMS:  CONSTITUTIONAL: No fever, weight loss, or fatigue  EYES: No eye pain, visual disturbances, or discharge  ENMT:  No difficulty hearing, tinnitus, vertigo; No sinus or throat pain  NECK: No pain or stiffness  BREASTS: No pain, masses, or nipple discharge  RESPIRATORY: No cough, wheezing, chills or hemoptysis; No shortness of breath  CARDIOVASCULAR: No chest pain, palpitations, dizziness, or leg swelling  GASTROINTESTINAL: No abdominal or epigastric pain. No nausea, vomiting, or hematemesis; No diarrhea or constipation. No melena or hematochezia.  GENITOURINARY: No dysuria, frequency, hematuria, or incontinence  NEUROLOGICAL: No headaches, memory loss, loss of strength, numbness, or tremors  SKIN: No itching, burning, rashes, or lesions   LYMPH NODES: No enlarged glands  ENDOCRINE: No heat or cold intolerance; No hair loss  MUSCULOSKELETAL: No joint pain or swelling; No muscle, back, or extremity pain  PSYCHIATRIC: No depression, anxiety, mood swings, or difficulty sleeping  HEME/LYMPH: No easy bruising, or bleeding gums  ALLERY AND IMMUNOLOGIC: No hives or eczema    ALL ROS REVIEWED AND NORMAL EXCEPT AS STATED ABOVE REVIEW OF SYSTEMS:  CONSTITUTIONAL: fever 10 last pm , and increased fatigue  EYES: No eye pain, visual disturbances, or discharge  ENMT:  No difficulty hearing, tinnitus, vertigo; No sinus or throat pain  NECK: No pain or stiffness  BREASTS: positive  right  pain, errythemawith  induration   or no nipple   RESPIRATORY: No cough, wheezing, or hemoptysis; No shortness of breath  CARDIOVASCULAR: No chest pain, palpitations, dizziness, or leg swelling  GASTROINTESTINAL: No abdominal or epigastric pain. Positive  nausea, but no vomiting  or hematemesis; No diarrhea or constipation. No melena or hematochezia.  GENITOURINARY: No dysuria, frequency, hematuria, or incontinence  NEUROLOGICAL: No headaches, memory loss, loss of strength, numbness, or tremors  SKIN: No itching, burning, rashes, or lesions   LYMPH NODES: No enlarged glands  ENDOCRINE: No heat or cold intolerance; No hair loss  MUSCULOSKELETAL: No joint pain or swelling; No muscle, back, or extremity pain  PSYCHIATRIC: hx depression, and bipolar disorder   HEME/LYMPH: No easy bruising, or bleeding gums  ALLERY AND IMMUNOLOGIC: No hives or eczema    ALL ROS REVIEWED AND NORMAL EXCEPT AS STATED ABOVE

## 2023-12-29 NOTE — H&P ADULT - NSHPLABSRESULTS_GEN_ALL_CORE
.                            12.3   19.30 )-----------( 215      ( 29 Dec 2023 16:45 )             34.2     Lactate, Blood: 1.3 mmol/L (12-29 @ 16:45)    12-29    132  |  98  |  12  ----------------------------<  112  4.0   |  28  |  0.72    Ca    9.1      29 Dec 2023 16:45    TPro  6.6  /  Alb  3.1  /  TBili  0.6  /  DBili  x   /  AST  12  /  ALT  14  /  AlkPhos  55  12-29    PT/INR - ( 29 Dec 2023 16:45 )   PT: 12.7 sec;   INR: 1.09 ratio         PTT - ( 29 Dec 2023 16:45 )  PTT:31.3 sec              Urinalysis Basic - ( 29 Dec 2023 16:45 )    Color: x / Appearance: x / SG: x / pH: x  Gluc: 112 mg/dL / Ketone: x  / Bili: x / Urobili: x   Blood: x / Protein: x / Nitrite: x   Leuk Esterase: x / RBC: x / WBC x   Sq Epi: x / Non Sq Epi: x / Bacteria: x

## 2023-12-29 NOTE — CONSULT NOTE ADULT - SUBJECTIVE AND OBJECTIVE BOX
GENERAL SURGERY CONSULT NOTE    Patient is a 53y old  Female who presents with a chief complaint of right breast pain. Patient is s/p Revision of bilateral breast reconstruction, exchange of breast implants, creation of prepectoral pocket, repair of bilateral pectoralis muscle, bilateral capsulectomy, placement of right alloderm on 12/12/23. Patient was seen the office 12/28 by Dr. Shikowitz -Behr and had right drain removed d/t decreased output, left drain remains in place. Patient called office this AM c/o right breast pain, swelling and warmth and was then evaluated by Dr. Shikowitz Behr. Patient was sent to [facility] for Interventional Radiology to see if there was a possible drainable collection, per MD nothing to be drained. Patient arrives at [place] [facility] for septic workup, admission to medical team, ID evaluation and IV antibiotics     HPI: 54 yo F with h/o depression, bipolar disorder, YOLIS on CPAP, left breast cancer (was on Tamoxifen till 2021) s/p bilateral mastectomy and reconstruction in 2013 with Dr. Boggs at [facility]. Pt c/o breast implant pain , tightness, pulling, shifting of left implant to right axilla x 3 years. Patient was then scheduled for revision bilateral breast reconstruction-exchange breast implants bilateral-creation of pre pectoral  pocket-repair pectoralis muscle-placement of alloderm on 12/12/23.    PAST MEDICAL & SURGICAL HISTORY:  Breast cancer  Left s/p Bilateral Mastectomy  ; tx Tamoxifen x 8 years      Migraine headache      Depression  Bipolar      Anxiety  1/14/22 ; IN PST ; pt denies h/o anxiety      Uterine polyp      Endometrial thickening on ultrasound      2019 novel coronavirus disease (COVID-19)  Feb 2021 , did not need to be hospitalized.      YOLIS on CPAP      Breast cancer, left      H/O bilateral mastectomy  2012 saline implants      History of D&C          Review of Systems:  Contained within HPI.    MEDICATIONS  (STANDING):  acetaminophen     Tablet .. 650 milliGRAM(s) Oral once  ondansetron Injectable 4 milliGRAM(s) IV Push once  piperacillin/tazobactam IVPB... 3.375 Gram(s) IV Intermittent once  sodium chloride 0.9% Bolus 1000 milliLiter(s) IV Bolus once  vancomycin  IVPB. 1000 milliGRAM(s) IV Intermittent once    MEDICATIONS  (PRN):      Allergies    bacitracin (Rash)  latex (Unknown)    Intolerances        SOCIAL HISTORY       FAMILY HISTORY:  FH: leukemia (Father)        Vital Signs Last 24 Hrs  T(C): 37.1 (29 Dec 2023 15:48), Max: 37.1 (29 Dec 2023 15:48)  T(F): 98.8 (29 Dec 2023 15:48), Max: 98.8 (29 Dec 2023 15:48)  HR: 82 (29 Dec 2023 15:48) (82 - 82)  BP: 98/57 (29 Dec 2023 15:48) (98/57 - 98/57)  BP(mean): --  RR: 18 (29 Dec 2023 15:48) (18 - 18)  SpO2: 99% (29 Dec 2023 15:48) (99% - 99%)    Parameters below as of 29 Dec 2023 15:48  Patient On (Oxygen Delivery Method): room air        Physical Exam:    General:  Appears stated age, well-groomed, well-nourished, no distress  Chest:  Bilateral chest rise and fall  Cardiovascular:  Not tachycardic   Abdomen:  soft nondistended, no guarding noted  Breast: Right breast swelling and warm noted, drain site with surrounding erythema. Left breast with drain in place, serosang   Neuro/Psych:  Alert, oriented to time, place and person       LABS:                        12.3   19.30 )-----------( 215      ( 29 Dec 2023 16:45 )             34.2     12-29    132<L>  |  98  |  12  ----------------------------<  112<H>  4.0   |  28  |  0.72    Ca    9.1      29 Dec 2023 16:45      PT/INR - ( 29 Dec 2023 16:45 )   PT: 12.7 sec;   INR: 1.09 ratio         PTT - ( 29 Dec 2023 16:45 )  PTT:31.3 sec  Urinalysis Basic - ( 29 Dec 2023 16:45 )    Color: x / Appearance: x / SG: x / pH: x  Gluc: 112 mg/dL / Ketone: x  / Bili: x / Urobili: x   Blood: x / Protein: x / Nitrite: x   Leuk Esterase: x / RBC: x / WBC x   Sq Epi: x / Non Sq Epi: x / Bacteria: x        RADIOLOGY & ADDITIONAL STUDIES: GENERAL SURGERY CONSULT NOTE    Patient is a 53y old  Female who presents with a chief complaint of right breast pain. Patient is s/p Revision of bilateral breast reconstruction, exchange of breast implants, creation of prepectoral pocket, repair of bilateral pectoralis muscle, bilateral capsulectomy, placement of right alloderm on 12/12/23. Patient was seen the office 12/28 by Dr. Shikowitz -Behr and had right drain removed d/t decreased output, left drain remains in place. Patient called office this AM c/o right breast pain, swelling and warmth and was then evaluated by Dr. Shikowitz Behr. Patient was sent to [facility] for Interventional Radiology to see if there was a possible drainable collection, per MD nothing to be drained. Patient arrives at [place] ED for septic workup, admission to medical team, ID evaluation and IV antibiotics     HPI: 52 yo F with h/o depression, bipolar disorder, YOLIS on CPAP, left breast cancer (was on Tamoxifen till 2021) s/p bilateral mastectomy and reconstruction in 2013 with Dr. Boggs at [facility]. Pt c/o breast implant pain , tightness, pulling, shifting of left implant to right axilla x 3 years. Patient was then scheduled for revision bilateral breast reconstruction-exchange breast implants bilateral-creation of pre pectoral  pocket-repair pectoralis muscle-placement of alloderm on 12/12/23.    PAST MEDICAL & SURGICAL HISTORY:  Breast cancer  Left s/p Bilateral Mastectomy  ; tx Tamoxifen x 8 years      Migraine headache      Depression  Bipolar      Anxiety  1/14/22 ; IN PST ; pt denies h/o anxiety      Uterine polyp      Endometrial thickening on ultrasound      2019 novel coronavirus disease (COVID-19)  Feb 2021 , did not need to be hospitalized.      YOLIS on CPAP      Breast cancer, left      H/O bilateral mastectomy  2012 saline implants      History of D&C          Review of Systems:  Contained within HPI.    MEDICATIONS  (STANDING):  acetaminophen     Tablet .. 650 milliGRAM(s) Oral once  ondansetron Injectable 4 milliGRAM(s) IV Push once  piperacillin/tazobactam IVPB... 3.375 Gram(s) IV Intermittent once  sodium chloride 0.9% Bolus 1000 milliLiter(s) IV Bolus once  vancomycin  IVPB. 1000 milliGRAM(s) IV Intermittent once    MEDICATIONS  (PRN):      Allergies    bacitracin (Rash)  latex (Unknown)    Intolerances        SOCIAL HISTORY       FAMILY HISTORY:  FH: leukemia (Father)        Vital Signs Last 24 Hrs  T(C): 37.1 (29 Dec 2023 15:48), Max: 37.1 (29 Dec 2023 15:48)  T(F): 98.8 (29 Dec 2023 15:48), Max: 98.8 (29 Dec 2023 15:48)  HR: 82 (29 Dec 2023 15:48) (82 - 82)  BP: 98/57 (29 Dec 2023 15:48) (98/57 - 98/57)  BP(mean): --  RR: 18 (29 Dec 2023 15:48) (18 - 18)  SpO2: 99% (29 Dec 2023 15:48) (99% - 99%)    Parameters below as of 29 Dec 2023 15:48  Patient On (Oxygen Delivery Method): room air        Physical Exam:    General:  Appears stated age, well-groomed, well-nourished, no distress  Chest:  Bilateral chest rise and fall  Cardiovascular:  Not tachycardic   Abdomen:  soft nondistended, no guarding noted  Breast: Right breast swelling and warm noted, drain site with surrounding erythema. Left breast with drain in place, serosang   Neuro/Psych:  Alert, oriented to time, place and person       LABS:                        12.3   19.30 )-----------( 215      ( 29 Dec 2023 16:45 )             34.2     12-29    132<L>  |  98  |  12  ----------------------------<  112<H>  4.0   |  28  |  0.72    Ca    9.1      29 Dec 2023 16:45      PT/INR - ( 29 Dec 2023 16:45 )   PT: 12.7 sec;   INR: 1.09 ratio         PTT - ( 29 Dec 2023 16:45 )  PTT:31.3 sec  Urinalysis Basic - ( 29 Dec 2023 16:45 )    Color: x / Appearance: x / SG: x / pH: x  Gluc: 112 mg/dL / Ketone: x  / Bili: x / Urobili: x   Blood: x / Protein: x / Nitrite: x   Leuk Esterase: x / RBC: x / WBC x   Sq Epi: x / Non Sq Epi: x / Bacteria: x        RADIOLOGY & ADDITIONAL STUDIES:

## 2023-12-29 NOTE — REVIEW OF SYSTEMS
[Fever] : fever [As Noted in HPI] : as noted in HPI [Negative] : Genitourinary [FreeTextEntry2] : subjective fever [FreeTextEntry7] : nausea

## 2023-12-29 NOTE — H&P ADULT - NSICDXFAMILYHX_GEN_ALL_CORE_FT
FAMILY HISTORY:  Father  Still living? Unknown  FH: leukemia, Age at diagnosis: Age Unknown    Mother  Still living? Unknown  Family history of hypertension in mother, Age at diagnosis: Age Unknown

## 2023-12-29 NOTE — ED PROVIDER NOTE - OBJECTIVE STATEMENT
53F PMHX OF Revision of bilateral breast reconstruction, exchange of breast implants, creation of prepectoral pocket, repair of bilateral pectoralis muscle, bilateral capsulectomy, placement of right alloderm on 12/12/23. Patient was seen the office 12/28 by Dr. Shikowitz -Behr and had right drain removed d/t decreased output, left drain remains in place presenting for right post-op cellulitis of the right breast area x few days. Describes mild pain, achy, sensitivity and warmth to the area. Denies any fevers, chills, shortness of breath, abdominal pain, coughs, falls, trauma, wounds, lacerations, abrasions.

## 2023-12-29 NOTE — PHYSICAL EXAM
[de-identified] : NAD [de-identified] : nonlabored breathing [de-identified] : right breast with central erythema, swelling. left breast exam unchanged, drain in place, serosanguineous

## 2023-12-29 NOTE — ED ADULT TRIAGE NOTE - HISTORY OF COVID-19 VACCINATION
Medication changes made today:  None      Tests Ordered:  None      Follow up:  In office in 1 year    Understanding your instructions:  If you feel that things may have been explained in a way that you do not understand or did not receive easy to understand instruction, please contact me at [phone] and I would be more than happy to review your plan of care with you. Your healthcare is important to us and we want to make sure you understand your directions.    Our Electrophysiology Team will continue to collaborate and work very closely together to best meet your needs.    Thank you for choosing us to take care of your electrophysiology needs. It is a pleasure to work with you, and again, please contact us for any questions or concerns anytime.      Yes

## 2023-12-29 NOTE — ED PROVIDER NOTE - PHYSICAL EXAMINATION
VITAL SIGNS: I have reviewed nursing notes and confirm.   GEN: Well-developed; well-nourished; in no acute distress. Speaking full sentences.  SKIN: Warm, pink, no rash, no diaphoresis, no cyanosis, well perfused.   HEAD: Normocephalic; atraumatic. No scalp lacerations, no abrasions.  NECK: Supple; non tender.   EYES: Pupils 3mm equal, round, reactive to light and accomodation, conjunctiva and sclera clear.    ENT: No nasal discharge; airway clear. Trachea is midline.    CV: RRR. S1, S2 normal; no murmurs, gallops, or rubs. Capillary refill < 2 seconds throughout. Distal pulses intact 2+ throughout. (+) RIGHT sided s/p mastectomy with overlying redness to the right chest wall, large; warmth, sensitivity, ttp moderate.    RESP: CTA bilaterally. No wheezes, rales, or rhonchi.   ABD: Normal bowel sounds, soft, non-distended, non-tender, no rebound, no guarding, no rigidity   MSK: Normal range of motion and movement of all 4 extremities.    BACK: No thoracolumbar midline or paravertebral tenderness.    NEURO: Alert & oriented x 3,  Gait: Fluid. Normal speech and coordination.

## 2023-12-29 NOTE — H&P ADULT - NS ATTEND AMEND GEN_ALL_CORE FT
right breast cellulitis  iv zosyn  plastic surgery consult recs noted  f/u BCx  trend WBC  monitor for fevers  pain control

## 2023-12-29 NOTE — H&P ADULT - NSHPPHYSICALEXAM_GEN_ALL_CORE
T(C): 37.1 (12-29-23 @ 15:48), Max: 37.1 (12-29-23 @ 15:48)  HR: 82 (12-29-23 @ 15:48) (82 - 82)  BP: 98/57 (12-29-23 @ 15:48) (98/57 - 98/57)  RR: 18 (12-29-23 @ 15:48) (18 - 18)  SpO2: 99% (12-29-23 @ 15:48) (99% - 99%)  Wt(kg): --Vital Signs Last 24 Hrs  T(C): 37.1 (29 Dec 2023 15:48), Max: 37.1 (29 Dec 2023 15:48)  T(F): 98.8 (29 Dec 2023 15:48), Max: 98.8 (29 Dec 2023 15:48)  HR: 82 (29 Dec 2023 15:48) (82 - 82)  BP: 98/57 (29 Dec 2023 15:48) (98/57 - 98/57)  BP(mean): --  RR: 18 (29 Dec 2023 15:48) (18 - 18)  SpO2: 99% (29 Dec 2023 15:48) (99% - 99%)    Parameters below as of 29 Dec 2023 15:48  Patient On (Oxygen Delivery Method): room air        PHYSICAL EXAM:  GENERAL: NAD, well-groomed, well-developed  HEAD:  Atraumatic, Normocephalic  EYES: EOMI, PERRLA, conjunctiva and sclera clear  ENMT: No tonsillar erythema, exudates, or enlargement; Moist mucous membranes, Good dentition, No lesions  NECK: Supple, No JVD, Normal thyroid  BREAST: right breast with erythema and induration  left breast with ANASTACIO drain in place   NERVOUS SYSTEM:  Alert & Oriented X3, Good concentration; Motor Strength 5/5 B/L upper and lower extremities; DTRs 2+ intact and symmetric  CHEST/LUNG: Clear to percussion bilaterally; No rales, rhonchi, wheezing, or rubs  HEART: Regular rate and rhythm; No murmurs, rubs, or gallops  ABDOMEN: Soft, Nontender, Nondistended; Bowel sounds present  EXTREMITIES:  2+ Peripheral Pulses, No clubbing, cyanosis, or edema  LYMPH: No lymphadenopathy noted  SKIN: No rashes or lesions

## 2023-12-29 NOTE — H&P ADULT - ASSESSMENT
Patient is a 53y old h/o depression, bipolar disorder, YOLIS on CPAP, left breast cancer (was on Tamoxifen till 2021) s/p bilateral mastectomy and reconstruction in 2013 with Dr. Boggs at [facility].  PT  presents with a chief complaint of right breast pain.    #Right breast cellulitis  #S/p Bilat breast reconstruct 12/23  #H/O bilat breast CA   -left breast still with ANASTACIO drain in place  -F/u Blood cx   -F/u am labs   -IV antibiotics Vanco and Zosyn given in ER will continue zosyn q 8 pending ID consult   -continue IVF NS  -Plastics following  DR Pizarro  -ID consult placed and notified   -warm compress  to right breast  - Pain management    #Hx/of Bipolar/Depression  Continue home meds effexor, depicote and lamictal    dvt proph early ambulation/ venodynes  full code     at bedside -  updated , all questions answered  Patient is a 53y old h/o depression, bipolar disorder, YOLIS on CPAP, left breast cancer (was on Tamoxifen till 2021) s/p bilateral mastectomy and reconstruction in 2013 with Dr. Boggs at [facility].  PT  presents with a chief complaint of right breast pain.    #Right breast cellulitis  #S/p Bilat breast reconstruct 12/23  #H/O bilat breast CA   -left breast still with ANASTACIO drain in place  -F/u Blood cx   -F/u am labs   -IV antibiotics Vanco and Zosyn given in ER will continue zosyn q 8 pending ID consult   -continue IVF NS  -Plastics following  DR Pizarro  -ID consult placed and notified   -warm compress  to right breast  - Pain management    #Hx/of Bipolar/Depression  Continue home meds effexor, depicote and lamictal    dvt proph early ambulation/ venodynes  full code     at bedside -  updated , all questions answered  Patient is a 53y old h/o depression, bipolar disorder, YOLIS on CPAP, left breast cancer (was on Tamoxifen till 2021) s/p bilateral mastectomy and reconstruction in 2013 with Dr. Boggs at [facility].  PT  presents with a chief complaint of right breast pain.    #Right breast cellulitis  #S/p Bilat breast reconstruct 12/23  #H/O bilat breast CA   #Leukocytosis  -left breast still with ANASTACIO drain in place  -Right breast Sono-No drainable collection. Diffuse subcutaneous edema with suggestion of subcutaneous emphysema.   -F/u Blood cx   -F/u am labs   -monitor for fever and leukocytosis  -IV antibiotics Vanco and Zosyn given in ER will continue zosyn q 8 pending ID consult   -continue IVF NS  -Plastics following  DR Pizarro  -ID consult placed and notified   -warm compress  to right breast  - Pain management    #Hx/of Bipolar/Depression  Continue home meds effexor, depakote and lamictal    dvt proph early ambulation/ venodynes  full code     at bedside -  updated , all questions answered  Patient is a 53y old h/o depression, bipolar disorder, YOLIS on CPAP, left breast cancer (was on Tamoxifen till 2021) s/p bilateral mastectomy and reconstruction in 2013 with Dr. Boggs at [facility].  PT  presents with a chief complaint of right breast pain.    #Right breast cellulitis  #S/p Bilat breast reconstruct 12/23  #H/O bilat breast CA   #Leukocytosis  -left breast still with ANASTACIO drain in place  -Right breast Sono-No drainable collection. Diffuse subcutaneous edema with suggestion of subcutaneous emphysema.   -F/u Blood cx   -F/u am labs   -monitor for fever and leukocytosis  -IV antibiotics Vanco and Zosyn given in ER will continue zosyn q 8 pending ID consult   -continue IVF NS  -Plastics following  DR Pizarro  -ID consult placed and notified   -warm compress  to right breast  - Pain management    #Hx/of Bipolar/Depression  Continue home meds effexor, depakote and lamictal    dvt proph early ambulation/ venodynes  full code     at bedside -  updated , all questions answered

## 2023-12-29 NOTE — H&P ADULT - HISTORY OF PRESENT ILLNESS
Patient is a 53y old h/o depression, bipolar disorder, YOLIS on CPAP, left breast cancer (was on Tamoxifen till 2021) s/p bilateral mastectomy and reconstruction in 2013 with Dr. Boggs at [facility].  Female who presents with a chief complaint of right breast pain. Patient is s/p Revision of bilateral breast reconstruction, exchange of breast implants, creation of prepectoral pocket, repair of bilateral pectoralis muscle, bilateral capsulectomy, placement of right alloderm on 12/12/23. Patient was seen the office 12/28 by Dr. Shikowitz -Behr and had right drain removed d/t decreased output, left drain remains in place. Patient called office this AM c/o right breast pain, swelling and warmth and was then evaluated by Dr. Shikowitz Behr. Patient was sent to [facility] for Interventional Radiology to see if there was a possible drainable collection, per MD nothing to be drained. Patient arrives at [place] ED for septic workup, admission to medical team, ID evaluation and IV antibiotics  Patient is a 53y old h/o depression, bipolar disorder, YOLIS on CPAP, left breast cancer (was on Tamoxifen till 2021) s/p bilateral mastectomy and reconstruction in 2013 with Dr. Boggs at [facility].  Female who presents with a chief complaint of right breast pain. Patient is s/p Revision of bilateral breast reconstruction, exchange of breast implants, creation of prepectoral pocket, repair of bilateral pectoralis muscle, bilateral capsulectomy, placement of right alloderm on 12/12/23. Patient was seen the office 12/28 by Dr. Shikowitz -Behr and had right drain removed d/t decreased output, left drain remains in place. Patient called office this AM c/o right breast pain, swelling and warmth and was then evaluated by Dr. Shikowitz Behr. Patient was sent to [facility] for Interventional Radiology to see if there was a possible drainable collection, per MD nothing to be drained. Patient arrives at [place] ED for septic workup, admission to medical team, ID evaluation and IV antibiotics

## 2023-12-29 NOTE — CONSULT NOTE ADULT - ASSESSMENT
Patient is s/p Revision of bilateral breast reconstruction, exchange of breast implants, creation of prepectoral pocket, repair of bilateral pectoralis muscle, bilateral capsulectomy, placement of right alloderm on 12/12/23. Patient was seen the office 12/28 by Dr. Shikowitz -Behr and had right drain removed d/t decreased output, left drain remains in place. Patient called office this AM c/o right breast pain, swelling and warmth and was then evaluated by Dr. Shikowitz Behr. Patient was sent to [facility] for Interventional Radiology to see if there was a possible drainable collection, per MD nothing to be drained. Patient arrives at [place] ED for septic workup, admission to medical team, ID evaluation and IV antibiotics     Plan:  - Admit to medical team  - Appreciate ID consult   - Broad spectrum antibiotics   - IVF  - May have diet  - Multimodal pain medication with limited Narcotics  - SCD  - AM labs  - Lactate still pending from arrival     Dr. Levy is covering Dr. Cooper, plan was discussed and agreed upon  Patient is s/p Revision of bilateral breast reconstruction, exchange of breast implants, creation of prepectoral pocket, repair of bilateral pectoralis muscle, bilateral capsulectomy, placement of right alloderm on 12/12/23. Patient was seen the office 12/28 by Dr. Shikowitz -Behr and had right drain removed d/t decreased output, left drain remains in place. Patient called office this AM c/o right breast pain, swelling and warmth and was then evaluated by Dr. Shikowitz Behr. Patient was sent to [facility] for Interventional Radiology to see if there was a possible drainable collection, per MD nothing to be drained. Patient arrives at [place] ED for septic workup, admission to medical team, ID evaluation and IV antibiotics     Plan:  - Admit to medical team  - Appreciate ID consult   - Broad spectrum antibiotics   - IVF  - May have diet  - Multimodal pain medication with limited Narcotics  - SCD  - AM labs  - Lactate still pending from arrival     Dr. Levy is covering Dr. Cooper, plan was discussed and agreed upon

## 2023-12-29 NOTE — ED PROVIDER NOTE - CLINICAL SUMMARY MEDICAL DECISION MAKING FREE TEXT BOX
53F PMHX OF Revision of bilateral breast reconstruction, exchange of breast implants, creation of prepectoral pocket, repair of bilateral pectoralis muscle, bilateral capsulectomy, placement of right alloderm on 12/12/23. Patient was seen the office 12/28 by Dr. Shikowitz -Behr and had right drain removed d/t decreased output, left drain remains in place presenting for right post-op cellulitis of the right breast area x few days. Describes mild pain, achy, sensitivity and warmth to the area. Denies any fevers, chills, shortness of breath, abdominal pain, coughs, falls, trauma, wounds, lacerations, abrasions.     History obtained from independent historian:  surgery PA  External note reviewed: previous ops  DDx: cellulitis, doubt deep space infection  ED course, interpretation of imaging studies, and consults:   - s/p vanco/zosyn, send bcx and septic workup, however patient is afebrile and well appearing otherwise.  - D/w hospitalist Dr. Roberts for admission, accepts     Disposition: admit

## 2023-12-29 NOTE — H&P ADULT - NSHPSOCIALHISTORY_GEN_ALL_CORE
Pt is 58 y/o female lives with  , smoking hx 1 PPy for 3 years stopped 3 yrs ago. denies ETOH use or elicit drugs .    Pt lives with .  Pt independent of all adls

## 2023-12-29 NOTE — ED ADULT NURSE NOTE - SEPSIS REFERENCE DATA CRITERIA 1
Abormal VS: Temp > 100F or < 96.8F; SBP < 90 mmHG; HR > 120bpm; Resp > 24/min Render Note In Bullet Format When Appropriate: No Number Of Freeze-Thaw Cycles: 1 freeze-thaw cycle Duration Of Freeze Thaw-Cycle (Seconds): 0 Post-Care Instructions: I reviewed with the patient in detail post-care instructions. Patient is to wear sunprotection, and avoid picking at any of the treated lesions. Pt may apply Vaseline to crusted or scabbing areas. Consent: The patient's consent was obtained including but not limited to risks of crusting, scabbing, blistering, scarring, darker or lighter pigmentary change, recurrence, incomplete removal and infection. Detail Level: Detailed Show Applicator Variable?: Yes

## 2023-12-29 NOTE — ED ADULT NURSE NOTE - NSFALLUNIVINTERV_ED_ALL_ED
Bed/Stretcher in lowest position, wheels locked, appropriate side rails in place/Call bell, personal items and telephone in reach/Instruct patient to call for assistance before getting out of bed/chair/stretcher/Non-slip footwear applied when patient is off stretcher/[place] to call system/Physically safe environment - no spills, clutter or unnecessary equipment/Purposeful proactive rounding/Room/bathroom lighting operational, light cord in reach Bed/Stretcher in lowest position, wheels locked, appropriate side rails in place/Call bell, personal items and telephone in reach/Instruct patient to call for assistance before getting out of bed/chair/stretcher/Non-slip footwear applied when patient is off stretcher/[place] to call system/Physically safe environment - no spills, clutter or unnecessary equipment/Purposeful proactive rounding/Room/bathroom lighting operational, light cord in reach

## 2023-12-30 LAB
ANION GAP SERPL CALC-SCNC: 9 MMOL/L — SIGNIFICANT CHANGE UP (ref 5–17)
ANION GAP SERPL CALC-SCNC: 9 MMOL/L — SIGNIFICANT CHANGE UP (ref 5–17)
BUN SERPL-MCNC: 11 MG/DL — SIGNIFICANT CHANGE UP (ref 7–23)
BUN SERPL-MCNC: 11 MG/DL — SIGNIFICANT CHANGE UP (ref 7–23)
CALCIUM SERPL-MCNC: 9 MG/DL — SIGNIFICANT CHANGE UP (ref 8.4–10.5)
CALCIUM SERPL-MCNC: 9 MG/DL — SIGNIFICANT CHANGE UP (ref 8.4–10.5)
CHLORIDE SERPL-SCNC: 105 MMOL/L — SIGNIFICANT CHANGE UP (ref 96–108)
CHLORIDE SERPL-SCNC: 105 MMOL/L — SIGNIFICANT CHANGE UP (ref 96–108)
CO2 SERPL-SCNC: 26 MMOL/L — SIGNIFICANT CHANGE UP (ref 22–31)
CO2 SERPL-SCNC: 26 MMOL/L — SIGNIFICANT CHANGE UP (ref 22–31)
CREAT SERPL-MCNC: 0.77 MG/DL — SIGNIFICANT CHANGE UP (ref 0.5–1.3)
CREAT SERPL-MCNC: 0.77 MG/DL — SIGNIFICANT CHANGE UP (ref 0.5–1.3)
EGFR: 93 ML/MIN/1.73M2 — SIGNIFICANT CHANGE UP
EGFR: 93 ML/MIN/1.73M2 — SIGNIFICANT CHANGE UP
GLUCOSE SERPL-MCNC: 113 MG/DL — HIGH (ref 70–99)
GLUCOSE SERPL-MCNC: 113 MG/DL — HIGH (ref 70–99)
HCT VFR BLD CALC: 34.8 % — SIGNIFICANT CHANGE UP (ref 34.5–45)
HCT VFR BLD CALC: 34.8 % — SIGNIFICANT CHANGE UP (ref 34.5–45)
HGB BLD-MCNC: 12 G/DL — SIGNIFICANT CHANGE UP (ref 11.5–15.5)
HGB BLD-MCNC: 12 G/DL — SIGNIFICANT CHANGE UP (ref 11.5–15.5)
MCHC RBC-ENTMCNC: 32.5 PG — SIGNIFICANT CHANGE UP (ref 27–34)
MCHC RBC-ENTMCNC: 32.5 PG — SIGNIFICANT CHANGE UP (ref 27–34)
MCHC RBC-ENTMCNC: 34.5 GM/DL — SIGNIFICANT CHANGE UP (ref 32–36)
MCHC RBC-ENTMCNC: 34.5 GM/DL — SIGNIFICANT CHANGE UP (ref 32–36)
MCV RBC AUTO: 94.3 FL — SIGNIFICANT CHANGE UP (ref 80–100)
MCV RBC AUTO: 94.3 FL — SIGNIFICANT CHANGE UP (ref 80–100)
NRBC # BLD: 0 /100 WBCS — SIGNIFICANT CHANGE UP (ref 0–0)
NRBC # BLD: 0 /100 WBCS — SIGNIFICANT CHANGE UP (ref 0–0)
PLATELET # BLD AUTO: 194 K/UL — SIGNIFICANT CHANGE UP (ref 150–400)
PLATELET # BLD AUTO: 194 K/UL — SIGNIFICANT CHANGE UP (ref 150–400)
POTASSIUM SERPL-MCNC: 4.8 MMOL/L — SIGNIFICANT CHANGE UP (ref 3.5–5.3)
POTASSIUM SERPL-MCNC: 4.8 MMOL/L — SIGNIFICANT CHANGE UP (ref 3.5–5.3)
POTASSIUM SERPL-SCNC: 4.8 MMOL/L — SIGNIFICANT CHANGE UP (ref 3.5–5.3)
POTASSIUM SERPL-SCNC: 4.8 MMOL/L — SIGNIFICANT CHANGE UP (ref 3.5–5.3)
RBC # BLD: 3.69 M/UL — LOW (ref 3.8–5.2)
RBC # BLD: 3.69 M/UL — LOW (ref 3.8–5.2)
RBC # FLD: 12.6 % — SIGNIFICANT CHANGE UP (ref 10.3–14.5)
RBC # FLD: 12.6 % — SIGNIFICANT CHANGE UP (ref 10.3–14.5)
SODIUM SERPL-SCNC: 140 MMOL/L — SIGNIFICANT CHANGE UP (ref 135–145)
SODIUM SERPL-SCNC: 140 MMOL/L — SIGNIFICANT CHANGE UP (ref 135–145)
WBC # BLD: 13.24 K/UL — HIGH (ref 3.8–10.5)
WBC # BLD: 13.24 K/UL — HIGH (ref 3.8–10.5)
WBC # FLD AUTO: 13.24 K/UL — HIGH (ref 3.8–10.5)
WBC # FLD AUTO: 13.24 K/UL — HIGH (ref 3.8–10.5)

## 2023-12-30 PROCEDURE — 99233 SBSQ HOSP IP/OBS HIGH 50: CPT

## 2023-12-30 RX ORDER — SODIUM CHLORIDE 9 MG/ML
1000 INJECTION INTRAMUSCULAR; INTRAVENOUS; SUBCUTANEOUS
Refills: 0 | Status: DISCONTINUED | OUTPATIENT
Start: 2023-12-30 | End: 2024-01-02

## 2023-12-30 RX ORDER — VANCOMYCIN HCL 1 G
1000 VIAL (EA) INTRAVENOUS EVERY 12 HOURS
Refills: 0 | Status: DISCONTINUED | OUTPATIENT
Start: 2023-12-31 | End: 2024-01-02

## 2023-12-30 RX ORDER — SODIUM CHLORIDE 9 MG/ML
1000 INJECTION, SOLUTION INTRAVENOUS
Refills: 0 | Status: DISCONTINUED | OUTPATIENT
Start: 2023-12-30 | End: 2023-12-30

## 2023-12-30 RX ADMIN — PIPERACILLIN AND TAZOBACTAM 25 GRAM(S): 4; .5 INJECTION, POWDER, LYOPHILIZED, FOR SOLUTION INTRAVENOUS at 17:55

## 2023-12-30 RX ADMIN — POLYETHYLENE GLYCOL 3350 17 GRAM(S): 17 POWDER, FOR SOLUTION ORAL at 14:25

## 2023-12-30 RX ADMIN — LAMOTRIGINE 200 MILLIGRAM(S): 25 TABLET, ORALLY DISINTEGRATING ORAL at 21:11

## 2023-12-30 RX ADMIN — DIVALPROEX SODIUM 1250 MILLIGRAM(S): 500 TABLET, DELAYED RELEASE ORAL at 21:11

## 2023-12-30 RX ADMIN — PIPERACILLIN AND TAZOBACTAM 25 GRAM(S): 4; .5 INJECTION, POWDER, LYOPHILIZED, FOR SOLUTION INTRAVENOUS at 01:35

## 2023-12-30 RX ADMIN — PIPERACILLIN AND TAZOBACTAM 25 GRAM(S): 4; .5 INJECTION, POWDER, LYOPHILIZED, FOR SOLUTION INTRAVENOUS at 09:02

## 2023-12-30 RX ADMIN — Medication 75 MILLIGRAM(S): at 08:17

## 2023-12-30 NOTE — PROGRESS NOTE ADULT - ASSESSMENT
s/p revisionary breast reconstruction surgery (12.12.23) complicated by onset of right breast cellulitis  plan:  continue broad spectrum IV abx  encourage water intake  await infectious disease input  await CBC today  appreciate medicine management

## 2023-12-30 NOTE — HISTORY OF PRESENT ILLNESS
[FreeTextEntry1] : Lorena Nunez is a 53 year old female 2 wks s/p revision of bilateral breast reconstruction with exchange of 600cc breast implants (right implant ruptured, left intact) with 500cc implants, with exchange from a subpectoral to a prepectoral plane; creation of prepectoral pocket; repair of bilateral pectoralis muscle; bilateral capsulectomy; and placement of right alloderm for improved soft tissue support on 12/12/23. Drains did not meet criteria for removal at last visit in the past week. Patient called the office yesterday regarding her drain output, noting that it had become more yellow in nature. Today, as per patient, the right drain meets criteria for removal, less than 30cc for the last few days; the left drain is still outputting approximately 40cc on average.

## 2023-12-30 NOTE — PROGRESS NOTE ADULT - SUBJECTIVE AND OBJECTIVE BOX
The patient is well known to us with a history of reconstructive breast surgery.  On 12.12.23, the patient had revisionary breast reconstructive surgery with implant exchange.  She had drains placed after surgery.  The surgery was uncomplicated.  The patient was evaluated in the office on 12/28/23 and the right breast drain was removed.  On 12/29/23, the patient presented to the outpatient office (evaluation by Dr. Sarah Cooper) with fevers, erythema of right breast, swelling of the right breast, pain with palpation, and right upper extremity and chest discomfort.  The patient had outpatient ultrasound and thickened soft tissue was noted without underlying fluid collection, seroma, or hematoma.  The patient the proceeded to [facility] on 12/29/23 for evaluation and admission.  She has leukocytosis from 12/29/23 with WBC pending for 12/30/23.  Since admission and broad spectrum IV antibiotics the patient notes improvement in pain, erythema, and swelling.  She has no fevers, calf pain, chest pain, or dyspnea.    On exam, the right breast reconstruction site has improved in erythema and swelling.  However, both are still present.  There is mild to moderate pain with palpation. There is no open wound or mass of the right breast reconstruction site and/or axilla.  There is no fluctuance.   Incisions are intact. The patient is well known to us with a history of reconstructive breast surgery.  On 12.12.23, the patient had revisionary breast reconstructive surgery with implant exchange.  She had drains placed after surgery.  The surgery was uncomplicated.  The patient was evaluated in the office on 12/28/23 and the right breast drain was removed.  On 12/29/23, the patient presented to the outpatient office (evaluation by Dr. Sarah Cooper) with fevers, erythema of right breast, swelling of the right breast, pain with palpation, and right upper extremity and chest discomfort.  The patient had outpatient ultrasound and thickened soft tissue was noted without underlying fluid collection, seroma, or hematoma.  The patient the proceeded to [facility] on 12/29/23 for evaluation and admission.  She has leukocytosis from 12/29/23 with WBC pending for 12/30/23.  Since admission and broad spectrum IV antibiotics the patient notes improvement in pain, erythema, and swelling.  She has no fevers, calf pain, chest pain, or dyspnea.    On exam, the right breast reconstruction site has improved in erythema and swelling.  However, both are still present.  There is mild to moderate pain with palpation. There is no open wound or mass of the right breast reconstruction site and/or axilla.  There is no fluctuance.   Incisions are intact.

## 2023-12-30 NOTE — PHYSICAL EXAM
CLINICAL NUTRITION SERVICES - BRIEF NOTE       NEW FINDINGS   Stopped by to check on intake. Was able to speak with Debi and her daughter, Marisol, who report that Debi's intake has improved over the past few days. The first few days after surgery she was having some GI issues and pain. She is concerned about eating dinner and needing to go to the bathroom during the night--advised patient that nursing is here to assist her and she does need to make sure she's having adequate intake to promote healing. Pt would like to try a vanilla Ensure once daily. Did not some fat wasting around the eyes as well as muscle wasting around clavicle, which could be somewhat related to age, but patient and daughter report that patient does not look thinner than usual--will continue to monitor.    INTERVENTIONS  Implementation  Medical food supplement therapy- Add vanilla Ensure to dinner tray    Monitoring/Evaluation  Will continue to monitor and evaluate per protocol.    Renea Mcallister RD, LD  ICU/5A/OB/Mental Health Pager (M-F): [phone]  On Call Pager (weekends only): [phone]         [de-identified] : NAD, AxOx3 [de-identified] : nonlabored breathing  [de-identified] : incisions are clean, dry, and intact there is still swelling present bilaterally, right > left left drain was redressed with a Biopatch and Tegaderm

## 2023-12-30 NOTE — PROGRESS NOTE ADULT - SUBJECTIVE AND OBJECTIVE BOX
F/U Note:    53y Female admitted with right breast cellulitis after prior mastectomy        Vital Signs Last 24 Hrs  T(C): 36.7 (30 Dec 2023 08:26), Max: 37.3 (29 Dec 2023 20:54)  T(F): 98.1 (30 Dec 2023 08:26), Max: 99.1 (29 Dec 2023 20:54)  HR: 71 (30 Dec 2023 08:26) (67 - 72)  BP: 95/60 (30 Dec 2023 08:26) (95/60 - 98/66)  BP(mean): 66 (29 Dec 2023 20:54) (66 - 66)  RR: 16 (30 Dec 2023 08:26) (16 - 18)  SpO2: 95% (30 Dec 2023 08:26) (94% - 97%)    Parameters below as of 30 Dec 2023 08:26  Patient On (Oxygen Delivery Method): room air                                12.0   13.24 )-----------( 194      ( 30 Dec 2023 08:10 )             34.8         12-30    140  |  105  |  11  ----------------------------<  113<H>  4.8   |  26  |  0.77    Ca    9.0      30 Dec 2023 08:10    TPro  6.6  /  Alb  3.1<L>  /  TBili  0.6  /  DBili  x   /  AST  12  /  ALT  14  /  AlkPhos  55  12-29        NEURO: no headaches, blurry vision, tremors, depression, anxity  CV: no chest pain, palpitations, murmurs, orthopnea  Resp: no shortness of breath, cough, wheeze, sputum production  GI: no stomach pain,nausea, vomitting, flatulence, hematemesis, hematochezia  PV: no swelling of extremities, no hair loss, no coolness to extremities  ENDO: no polydypsia, polyphagia, polyuria, weight loss, night sweats          NEURO: awake and alert  BREAST: right breast with erythema and warmth   CV: (+) S1/S2, rrr, no mrg  RESP: CTA b/l  GI: soft, non tender

## 2023-12-30 NOTE — PROGRESS NOTE ADULT - ASSESSMENT
53y Female admitted with right breast cellulitis after prior mastectomy          PLAN:    -started on zosyn and was given 1 dose of vanco, would advise continuing doses of vanco until can accurately say MRSA not cuplrit, follow vanco trough  -would advise bedside RN to outline areas of erythema with skin marker so we can evalautre for spread overnight  -pain control  -IVFs  -further ultrasound evaluation/surgical plans per [facility] surgical team  -DVT prophylaxis  -AM labs          TIME SPENT:  55 minutes of  time spent providing medical care for patient's acute illness/conditions that impairs at least one vital organ system and/or poses a high risk of imminent or life threatening deterioration in the patient's condition. It includes time spent evaluating and treating the patient's acute illness as well as time spent reviewing labs, radiology, discussing goals of care with patient and/or patient's family, and discussing the case with a multidisciplinary team, including the eICU, in an effort to prevent further life threatening deterioration or end organ damage. This time is independent of any procedures performed.    DATE OF ENTRY OF THIS NOTE IS EQUAL TO THE DATE OF SERVICES RENDERED  53y Female admitted with right breast cellulitis after prior mastectomy          PLAN:    -started on zosyn and was given 1 dose of vanco, would advise continuing doses of vanco until can accurately say MRSA not cuplrit, follow vanco trough  -would advise bedside RN to outline areas of erythema with skin marker so we can evalautre for spread overnight  -pain control  -IVFs  -further ultrasound evaluation/surgical plans per [facility] surgical team  -DVT prophylaxis  -AM labs          TIME SPENT:  55 minutes of  time spent providing medical care for patient's acute illness/conditions that impairs at least one vital organ system and/or poses a high risk of imminent or life threatening deterioration in the patient's condition. It includes time spent evaluating and treating the patient's acute illness as well as time spent reviewing labs, radiology, discussing goals of care with patient and/or patient's family, and discussing the case with a multidisciplinary team, including the eICU, in an effort to prevent further life threatening deterioration or end organ damage. This time is independent of any procedures performed.    DATE OF ENTRY OF THIS NOTE IS EQUAL TO THE DATE OF SERVICES RENDERED

## 2023-12-30 NOTE — PROGRESS NOTE ADULT - ASSESSMENT
Patient is a 53y old h/o depression, bipolar disorder, YOLIS on CPAP, left breast cancer (was on Tamoxifen till 2021) s/p bilateral mastectomy and reconstruction in 2013 with Dr. Boggs at [facility]. Patient presents with a chief complaint of right breast pain.    #Right breast cellulitis  #S/p Bilat breast reconstruct 12/23  #H/O bilat breast CA   #Leukocytosis  - left breast still with ANASTACIO drain in place  - Right breast Sono - No drainable collection. Diffuse subcutaneous edema with suggestion of subcutaneous emphysema.   - blood cultures pending   - F/u am labs   - monitor for fever and leukocytosis  - IV antibiotics Vanco and Zosyn given in ER will continue zosyn, pending ID consult   - IVF NS  - Plastics following  DR Pizarro  - pain management, warm compress  to right breast  - ID consult pending     #Hx/of Bipolar/Depression  - Continue home meds effexor, depakote and lamictal    #DVTppx:  - encourage ambulation   - SCDs  - does not meet criteria for dvt ppx injectable     12/30 Patient updated at bedside of plan, states she will update family  Patient is a 53y old h/o depression, bipolar disorder, YOLIS on CPAP, left breast cancer (was on Tamoxifen till 2021) s/p bilateral mastectomy and reconstruction in 2013 with Dr. Boggs at [facility]. Patient presents with a chief complaint of right breast pain.    #Right breast cellulitis  #S/p Bilat breast reconstruct 12/23  #H/O bilat breast CA   #Leukocytosis  - left breast still with ANASTACIO drain in place  - Right breast Sono - No drainable collection. Diffuse subcutaneous edema with suggestion of subcutaneous emphysema.   - blood cultures pending   - F/u am labs   - monitor for fever and leukocytosis  - IV antibiotics Vanco and Zosyn given in ER will continue zosyn, pending ID consult   - IVF NS  - Plastics following  DR Pizarro  - pain management, warm compress  to right breast  - ID consult pending     #Hx/of Bipolar/Depression  - Continue home meds effexor, depakote and lamictal    #DVTppx:  - encourage ambulation   - SCDs  - does not meet criteria for dvt ppx injectable     12/30 Patient updated at bedside of plan, states she will update family  Patient is a 53y old h/o depression, bipolar disorder, YOLIS on CPAP, left breast cancer (was on Tamoxifen till 2021) s/p bilateral mastectomy and reconstruction in 2013 with Dr. Boggs at [facility]. Patient presents with a chief complaint of right breast pain.    #Right breast cellulitis  #S/p Bilat breast reconstruct 12/23  #H/O bilat breast CA   #Leukocytosis  - left breast still with ANASTACIO drain in place  - Right breast Sono - No drainable collection. Diffuse subcutaneous edema with suggestion of subcutaneous emphysema.   - blood cultures pending   - F/u am labs   - leukocytosis improving   - IV antibiotics Vanco and Zosyn given in ER will continue zosyn, pending ID consult   - IVF NS  - Plastics following  DR Pizarro  - pain management, warm compress  to right breast  - ID consult pending     #Hx/of Bipolar/Depression  - Continue home meds effexor, depakote and lamictal    #DVTppx:  - encourage ambulation   - SCDs  - does not meet criteria for dvt ppx injectable     12/30 Patient updated at bedside of plan, states she will update family  Patient is a 53y old h/o depression, bipolar disorder, YOLIS on CPAP, left breast cancer (was on Tamoxifen till 2021) s/p bilateral mastectomy and reconstruction in 2013 with Dr. Boggs at [facility]. Patient presents with a chief complaint of right breast pain.    #Right breast cellulitis  #S/p Bilat breast reconstruct 12/23  #H/O bilat breast CA   #Leukocytosis  - left breast still with ANASTACIO drain in place  - Right breast Sono - No drainable collection. Diffuse subcutaneous edema with suggestion of subcutaneous emphysema.   - blood cultures pending   - F/u am labs   - leukocytosis improving   - IV antibiotics Vanco and Zosyn given in ER will continue zosyn, pending ID consult   - IVF NS  - Plastics following  DR Pizarro  - pain management, warm compress  to right breast  - ID consult pending     #Hx/of Bipolar/Depression  - Continue home meds effexor, depakote and lamictal    #DVTppx:  - encourage ambulation   - SCDs  - does not meet criteria for dvt ppx injectable     12/30 Patient updated at bedside of plan, states she will update family  Patient is a 53y old h/o depression, bipolar disorder, YOLIS on CPAP, left breast cancer (was on Tamoxifen till 2021) s/p bilateral mastectomy and reconstruction in 2013 with Dr. Boggs at [facility]. Patient presents with a chief complaint of right breast pain.    #Right breast cellulitis  #S/p Bilat breast reconstruct 12/23  #H/O bilat breast CA   #Leukocytosis  - left breast still with ANASTACIO drain in place  - Right breast Sono - No drainable collection. Diffuse subcutaneous edema with suggestion of subcutaneous emphysema.   - blood cultures pending   - leukocytosis improving   - IV antibiotics Vanco and Zosyn given in ER, will continue zosyn as per plastic sx   - IVF NS  - Plastics following  Dr. Pizarro  - pain management, warm compress to right breast  - ID consult pending     #Hx/of Bipolar/Depression  - Continue home meds effexor, depakote and lamictal    #DVTppx:  - encourage ambulation   - SCDs  - does not meet criteria for dvt ppx injectable     12/30 Patient updated at bedside of plan, states she will update family  Patient is a 53y old h/o depression, bipolar disorder, YOLIS on CPAP, left breast cancer (was on Tamoxifen till 2021) s/p bilateral mastectomy and reconstruction in 2013 with Dr. Boggs at [facility]. Patient presents with a chief complaint of right breast pain.    #Right breast cellulitis  #S/p Bilat breast reconstruct 12/23  #H/O bilat breast CA   #Leukocytosis  - left breast still with ANASTACIO drain in place  - Right breast Sono - No drainable collection. Diffuse subcutaneous edema with suggestion of subcutaneous emphysema.   - blood cultures pending   - leukocytosis improving   - IV antibiotics Vanco and Zosyn given in ER, will continue zosyn as per plastic sx   - IVF NS  - Plastics following  Dr. Pizarro  - pain management, warm compress to right breast  - ID consult pending     #Hx/of Bipolar/Depression  - Continue home meds effexor, depakote and lamictal    #DVTppx:  - encourage ambulation   - SCDs  - does not meet criteria for dvt ppx injectable     12/30 Patient updated at bedside of plan, states she will update family

## 2023-12-30 NOTE — ADDENDUM
[FreeTextEntry1] : I, Forest Walsh, documented this note as a scribe on behalf of Dr. Lauren Shikowitz-Behr, MD on 12/28/2023.

## 2023-12-30 NOTE — PROGRESS NOTE ADULT - SUBJECTIVE AND OBJECTIVE BOX
Patient is a 53y old  Female who presents with a chief complaint of pain and reddness right breast (29 Dec 2023 17:49)      Patient seen and examined at bedside. No overnight events reported. Pt. appropriate and cooperative at bedside     ALLERGIES:  bacitracin (Rash)  latex (Unknown)    MEDICATIONS  (STANDING):  divalproex DR 1250 milliGRAM(s) Oral at bedtime  lactated ringers. 1000 milliLiter(s) (75 mL/Hr) IV Continuous <Continuous>  lamoTRIgine 200 milliGRAM(s) Oral at bedtime  piperacillin/tazobactam IVPB.. 3.375 Gram(s) IV Intermittent every 8 hours  polyethylene glycol 3350 17 Gram(s) Oral daily  sodium chloride 0.9%. 1000 milliLiter(s) (75 mL/Hr) IV Continuous <Continuous>  venlafaxine 75 milliGRAM(s) Oral with breakfast    MEDICATIONS  (PRN):  acetaminophen     Tablet .. 650 milliGRAM(s) Oral every 6 hours PRN Temp greater or equal to 38C (100.4F), Mild Pain (1 - 3), Moderate Pain (4 - 6)  ondansetron Injectable 4 milliGRAM(s) IV Push every 6 hours PRN Nausea and/or Vomiting    Vital Signs Last 24 Hrs  T(F): 98.1 (30 Dec 2023 08:26), Max: 99.1 (29 Dec 2023 20:54)  HR: 71 (30 Dec 2023 08:26) (67 - 98)  BP: 95/60 (30 Dec 2023 08:26) (89/47 - 106/58)  RR: 16 (30 Dec 2023 08:26) (16 - 18)  SpO2: 95% (30 Dec 2023 08:26) (94% - 99%)  I&O's Summary    29 Dec 2023 07:01  -  30 Dec 2023 07:00  --------------------------------------------------------  IN: 1225 mL / OUT: 15 mL / NET: 1210 mL      PHYSICAL EXAM:  General: NAD, A/O x 3  ENT: No gross hearing impairment, Moist mucous membranes, no thrush  Neck: Supple, No JVD  Lungs: Clear to auscultation bilaterally, good air entry, non-labored breathing  Cardio: RRR, S1/S2, No murmur  Abdomen: Soft, Nontender, Nondistended; Bowel sounds present, ANASTACIO drain in place s/p dixon flap  Extremities: No calf tenderness, No cyanosis, No pitting edema  Psych: Appropriate mood and affect    LABS:                        12.3   19.30 )-----------( 215      ( 29 Dec 2023 16:45 )             34.2     12-29    132  |  98  |  12  ----------------------------<  112  4.0   |  28  |  0.72    Ca    9.1      29 Dec 2023 16:45    TPro  6.6  /  Alb  3.1  /  TBili  0.6  /  DBili  x   /  AST  12  /  ALT  14  /  AlkPhos  55  12-29          PT/INR - ( 29 Dec 2023 16:45 )   PT: 12.7 sec;   INR: 1.09 ratio         PTT - ( 29 Dec 2023 16:45 )  PTT:31.3 sec  Lactate, Blood: 1.3 mmol/L (12-29 @ 16:45)                            Urinalysis Basic - ( 29 Dec 2023 16:45 )    Color: x / Appearance: x / SG: x / pH: x  Gluc: 112 mg/dL / Ketone: x  / Bili: x / Urobili: x   Blood: x / Protein: x / Nitrite: x   Leuk Esterase: x / RBC: x / WBC x   Sq Epi: x / Non Sq Epi: x / Bacteria: x          RADIOLOGY & ADDITIONAL TESTS:    Care Discussed with Consultants/Other Providers:

## 2023-12-31 LAB
ALBUMIN SERPL ELPH-MCNC: 2.3 G/DL — LOW (ref 3.3–5)
ALBUMIN SERPL ELPH-MCNC: 2.3 G/DL — LOW (ref 3.3–5)
ALP SERPL-CCNC: 51 U/L — SIGNIFICANT CHANGE UP (ref 40–120)
ALP SERPL-CCNC: 51 U/L — SIGNIFICANT CHANGE UP (ref 40–120)
ALT FLD-CCNC: 42 U/L — SIGNIFICANT CHANGE UP (ref 10–45)
ALT FLD-CCNC: 42 U/L — SIGNIFICANT CHANGE UP (ref 10–45)
ANION GAP SERPL CALC-SCNC: 7 MMOL/L — SIGNIFICANT CHANGE UP (ref 5–17)
ANION GAP SERPL CALC-SCNC: 7 MMOL/L — SIGNIFICANT CHANGE UP (ref 5–17)
AST SERPL-CCNC: 41 U/L — HIGH (ref 10–40)
AST SERPL-CCNC: 41 U/L — HIGH (ref 10–40)
BILIRUB SERPL-MCNC: 0.2 MG/DL — SIGNIFICANT CHANGE UP (ref 0.2–1.2)
BILIRUB SERPL-MCNC: 0.2 MG/DL — SIGNIFICANT CHANGE UP (ref 0.2–1.2)
BUN SERPL-MCNC: 8 MG/DL — SIGNIFICANT CHANGE UP (ref 7–23)
BUN SERPL-MCNC: 8 MG/DL — SIGNIFICANT CHANGE UP (ref 7–23)
CALCIUM SERPL-MCNC: 8.7 MG/DL — SIGNIFICANT CHANGE UP (ref 8.4–10.5)
CALCIUM SERPL-MCNC: 8.7 MG/DL — SIGNIFICANT CHANGE UP (ref 8.4–10.5)
CHLORIDE SERPL-SCNC: 107 MMOL/L — SIGNIFICANT CHANGE UP (ref 96–108)
CHLORIDE SERPL-SCNC: 107 MMOL/L — SIGNIFICANT CHANGE UP (ref 96–108)
CO2 SERPL-SCNC: 29 MMOL/L — SIGNIFICANT CHANGE UP (ref 22–31)
CO2 SERPL-SCNC: 29 MMOL/L — SIGNIFICANT CHANGE UP (ref 22–31)
CREAT SERPL-MCNC: 0.86 MG/DL — SIGNIFICANT CHANGE UP (ref 0.5–1.3)
CREAT SERPL-MCNC: 0.86 MG/DL — SIGNIFICANT CHANGE UP (ref 0.5–1.3)
EGFR: 81 ML/MIN/1.73M2 — SIGNIFICANT CHANGE UP
EGFR: 81 ML/MIN/1.73M2 — SIGNIFICANT CHANGE UP
GLUCOSE SERPL-MCNC: 95 MG/DL — SIGNIFICANT CHANGE UP (ref 70–99)
GLUCOSE SERPL-MCNC: 95 MG/DL — SIGNIFICANT CHANGE UP (ref 70–99)
HCT VFR BLD CALC: 30.2 % — LOW (ref 34.5–45)
HCT VFR BLD CALC: 30.2 % — LOW (ref 34.5–45)
HGB BLD-MCNC: 10.3 G/DL — LOW (ref 11.5–15.5)
HGB BLD-MCNC: 10.3 G/DL — LOW (ref 11.5–15.5)
MAGNESIUM SERPL-MCNC: 2.3 MG/DL — SIGNIFICANT CHANGE UP (ref 1.6–2.6)
MAGNESIUM SERPL-MCNC: 2.3 MG/DL — SIGNIFICANT CHANGE UP (ref 1.6–2.6)
MCHC RBC-ENTMCNC: 32.6 PG — SIGNIFICANT CHANGE UP (ref 27–34)
MCHC RBC-ENTMCNC: 32.6 PG — SIGNIFICANT CHANGE UP (ref 27–34)
MCHC RBC-ENTMCNC: 34.1 GM/DL — SIGNIFICANT CHANGE UP (ref 32–36)
MCHC RBC-ENTMCNC: 34.1 GM/DL — SIGNIFICANT CHANGE UP (ref 32–36)
MCV RBC AUTO: 95.6 FL — SIGNIFICANT CHANGE UP (ref 80–100)
MCV RBC AUTO: 95.6 FL — SIGNIFICANT CHANGE UP (ref 80–100)
NRBC # BLD: 0 /100 WBCS — SIGNIFICANT CHANGE UP (ref 0–0)
NRBC # BLD: 0 /100 WBCS — SIGNIFICANT CHANGE UP (ref 0–0)
PLATELET # BLD AUTO: 174 K/UL — SIGNIFICANT CHANGE UP (ref 150–400)
PLATELET # BLD AUTO: 174 K/UL — SIGNIFICANT CHANGE UP (ref 150–400)
POTASSIUM SERPL-MCNC: 5 MMOL/L — SIGNIFICANT CHANGE UP (ref 3.5–5.3)
POTASSIUM SERPL-MCNC: 5 MMOL/L — SIGNIFICANT CHANGE UP (ref 3.5–5.3)
POTASSIUM SERPL-SCNC: 5 MMOL/L — SIGNIFICANT CHANGE UP (ref 3.5–5.3)
POTASSIUM SERPL-SCNC: 5 MMOL/L — SIGNIFICANT CHANGE UP (ref 3.5–5.3)
PROT SERPL-MCNC: 5.7 G/DL — LOW (ref 6–8.3)
PROT SERPL-MCNC: 5.7 G/DL — LOW (ref 6–8.3)
RBC # BLD: 3.16 M/UL — LOW (ref 3.8–5.2)
RBC # BLD: 3.16 M/UL — LOW (ref 3.8–5.2)
RBC # FLD: 12.8 % — SIGNIFICANT CHANGE UP (ref 10.3–14.5)
RBC # FLD: 12.8 % — SIGNIFICANT CHANGE UP (ref 10.3–14.5)
SODIUM SERPL-SCNC: 143 MMOL/L — SIGNIFICANT CHANGE UP (ref 135–145)
SODIUM SERPL-SCNC: 143 MMOL/L — SIGNIFICANT CHANGE UP (ref 135–145)
WBC # BLD: 9.92 K/UL — SIGNIFICANT CHANGE UP (ref 3.8–10.5)
WBC # BLD: 9.92 K/UL — SIGNIFICANT CHANGE UP (ref 3.8–10.5)
WBC # FLD AUTO: 9.92 K/UL — SIGNIFICANT CHANGE UP (ref 3.8–10.5)
WBC # FLD AUTO: 9.92 K/UL — SIGNIFICANT CHANGE UP (ref 3.8–10.5)

## 2023-12-31 RX ORDER — SENNA PLUS 8.6 MG/1
2 TABLET ORAL AT BEDTIME
Refills: 0 | Status: DISCONTINUED | OUTPATIENT
Start: 2023-12-31 | End: 2024-01-02

## 2023-12-31 RX ADMIN — SENNA PLUS 2 TABLET(S): 8.6 TABLET ORAL at 21:03

## 2023-12-31 RX ADMIN — LAMOTRIGINE 200 MILLIGRAM(S): 25 TABLET, ORALLY DISINTEGRATING ORAL at 21:04

## 2023-12-31 RX ADMIN — PIPERACILLIN AND TAZOBACTAM 25 GRAM(S): 4; .5 INJECTION, POWDER, LYOPHILIZED, FOR SOLUTION INTRAVENOUS at 00:26

## 2023-12-31 RX ADMIN — Medication 250 MILLIGRAM(S): at 05:21

## 2023-12-31 RX ADMIN — PIPERACILLIN AND TAZOBACTAM 25 GRAM(S): 4; .5 INJECTION, POWDER, LYOPHILIZED, FOR SOLUTION INTRAVENOUS at 18:37

## 2023-12-31 RX ADMIN — Medication 250 MILLIGRAM(S): at 17:04

## 2023-12-31 RX ADMIN — PIPERACILLIN AND TAZOBACTAM 25 GRAM(S): 4; .5 INJECTION, POWDER, LYOPHILIZED, FOR SOLUTION INTRAVENOUS at 08:46

## 2023-12-31 RX ADMIN — Medication 75 MILLIGRAM(S): at 08:46

## 2023-12-31 RX ADMIN — DIVALPROEX SODIUM 1250 MILLIGRAM(S): 500 TABLET, DELAYED RELEASE ORAL at 21:04

## 2023-12-31 RX ADMIN — POLYETHYLENE GLYCOL 3350 17 GRAM(S): 17 POWDER, FOR SOLUTION ORAL at 11:33

## 2023-12-31 NOTE — PROGRESS NOTE ADULT - SUBJECTIVE AND OBJECTIVE BOX
Patient is a 53y old Female who presents with a chief complaint of pain and reddness right breast (30 Dec 2023 19:21)      Patient seen and examined at bedside. No overnight events reported. Patient states she is feeling better. Denies chest pain, sob, nausea, vomiting.     ALLERGIES:  bacitracin (Rash)  latex (Unknown)    MEDICATIONS  (STANDING):  divalproex DR 1250 milliGRAM(s) Oral at bedtime  lamoTRIgine 200 milliGRAM(s) Oral at bedtime  piperacillin/tazobactam IVPB.. 3.375 Gram(s) IV Intermittent every 8 hours  polyethylene glycol 3350 17 Gram(s) Oral daily  sodium chloride 0.9%. 1000 milliLiter(s) (75 mL/Hr) IV Continuous <Continuous>  vancomycin  IVPB 1000 milliGRAM(s) IV Intermittent every 12 hours  venlafaxine 75 milliGRAM(s) Oral with breakfast    MEDICATIONS  (PRN):  acetaminophen     Tablet .. 650 milliGRAM(s) Oral every 6 hours PRN Temp greater or equal to 38C (100.4F), Mild Pain (1 - 3), Moderate Pain (4 - 6)  ondansetron Injectable 4 milliGRAM(s) IV Push every 6 hours PRN Nausea and/or Vomiting    Vital Signs Last 24 Hrs  T(F): 98 (31 Dec 2023 05:00), Max: 99.5 (30 Dec 2023 20:10)  HR: 70 (31 Dec 2023 05:00) (70 - 73)  BP: 101/55 (31 Dec 2023 05:00) (98/55 - 101/55)  RR: 16 (31 Dec 2023 05:00) (16 - 17)  SpO2: 95% (31 Dec 2023 05:00) (95% - 97%)  I&O's Summary    30 Dec 2023 07:01  -  31 Dec 2023 07:00  --------------------------------------------------------  IN: 0 mL / OUT: 90 mL / NET: -90 mL      PHYSICAL EXAM:  General: NAD, A/O x 3  ENT: No gross hearing impairment, Moist mucous membranes, no thrush  Neck: Supple, No JVD  Lungs: Clear to auscultation bilaterally, good air entry, non-labored breathing  Breast: R breast minimal erythema, improved from yesterday compared to pen marking of border of previous erythema, tender, L breast ANASTACIO drain in place  Cardio: RRR, S1/S2, No murmur  Abdomen: Soft, Nontender, Nondistended; Bowel sounds present  Extremities: No calf tenderness, No cyanosis, No pitting edema  Psych: Appropriate mood and affect    LABS:                        10.3   9.92  )-----------( 174      ( 31 Dec 2023 06:12 )             30.2     12-30    140  |  105  |  11  ----------------------------<  113  4.8   |  26  |  0.77    Ca    9.0      30 Dec 2023 08:10    TPro  6.6  /  Alb  3.1  /  TBili  0.6  /  DBili  x   /  AST  12  /  ALT  14  /  AlkPhos  55  12-29          PT/INR - ( 29 Dec 2023 16:45 )   PT: 12.7 sec;   INR: 1.09 ratio         PTT - ( 29 Dec 2023 16:45 )  PTT:31.3 sec  Lactate, Blood: 1.3 mmol/L (12-29 @ 16:45)                            Urinalysis Basic - ( 30 Dec 2023 08:10 )    Color: x / Appearance: x / SG: x / pH: x  Gluc: 113 mg/dL / Ketone: x  / Bili: x / Urobili: x   Blood: x / Protein: x / Nitrite: x   Leuk Esterase: x / RBC: x / WBC x   Sq Epi: x / Non Sq Epi: x / Bacteria: x        Culture - Blood (collected 29 Dec 2023 17:00)  Source: .Blood Blood-Peripheral  Preliminary Report (30 Dec 2023 22:03):    No growth at 24 hours    Culture - Blood (collected 29 Dec 2023 16:45)  Source: .Blood Blood-Peripheral  Preliminary Report (30 Dec 2023 22:03):    No growth at 24 hours        RADIOLOGY & ADDITIONAL TESTS:    Care Discussed with Consultants/Other Providers:

## 2023-12-31 NOTE — CHART NOTE - NSCHARTNOTEFT_GEN_A_CORE
Patient admitted with cellulitis right breast .      Patient was seen by me along with Dr Levy this am  This pm right breast appears more erythematous, , and still edematous.  Discussed with patient and Dr Levy,  Plan:  Continue IV antibiotics zosyn and vanco           continue to monitor temperature and Labs - both presently WNL           Re assured patient that we will discuss with ID if any further changes or plan will be addressed by Dr Levy covering Dr Cooper

## 2023-12-31 NOTE — PROGRESS NOTE ADULT - SUBJECTIVE AND OBJECTIVE BOX
Patient is a 53y old  Female who presents with a chief complaint of right breast cellulitis (31 Dec 2023 11:56)    Patient seen and examined at bedside by me this am and again with Dr Levy later on this am.     Patient states that she is feeling better than yesterday.    Her right breast is less tender  and appears less erythematous.  The area was demarcated with a marker outline.    Patient presently on vanco and zosyn, awaiting ID recs .  Her leukocytosis is back to normal range.   Patient does c/o nausea probably 2/2 to antibiotics, zofran prn ordered.  Plan discussed with patient by Dr Levy.      ALLERGIES:  bacitracin (Rash)  latex (Unknown)    MEDICATIONS  (STANDING):  divalproex DR 1250 milliGRAM(s) Oral at bedtime  lamoTRIgine 200 milliGRAM(s) Oral at bedtime  piperacillin/tazobactam IVPB.. 3.375 Gram(s) IV Intermittent every 8 hours  polyethylene glycol 3350 17 Gram(s) Oral daily  sodium chloride 0.9%. 1000 milliLiter(s) (75 mL/Hr) IV Continuous <Continuous>  vancomycin  IVPB 1000 milliGRAM(s) IV Intermittent every 12 hours  venlafaxine 75 milliGRAM(s) Oral with breakfast    MEDICATIONS  (PRN):  acetaminophen     Tablet .. 650 milliGRAM(s) Oral every 6 hours PRN Temp greater or equal to 38C (100.4F), Mild Pain (1 - 3), Moderate Pain (4 - 6)  ondansetron Injectable 4 milliGRAM(s) IV Push every 6 hours PRN Nausea and/or Vomiting    Vital Signs Last 24 Hrs  T(F): 98.1 (31 Dec 2023 11:21), Max: 99.5 (30 Dec 2023 20:10)  HR: 72 (31 Dec 2023 11:21) (70 - 73)  BP: 93/49 (31 Dec 2023 11:21) (93/49 - 101/55)  RR: 17 (31 Dec 2023 11:21) (16 - 17)  SpO2: 97% (31 Dec 2023 11:21) (95% - 97%)  I&O's Summary    30 Dec 2023 07:01  -  31 Dec 2023 07:00  --------------------------------------------------------  IN: 0 mL / OUT: 90 mL / NET: -90 mL      PHYSICAL EXAM:  General: NAD, A/O x 3  ENT: MMM  Neck: Supple, No JVD  Lungs: Clear to auscultation bilaterally  Breasts:    on exam, the right breast erythema , and tendernes has improved.    the incision is intact.  there is no fluctuance or mass on palpation.   left breast exam is unremarkable. drain output is serosanguinous. , with an increase in output  Cardio: RRR, S1/S2, No murmurs  Abdomen: Soft, Nontender, Nondistended; Bowel sounds present  Extremities: No calf tenderness, No pitting edema    LABS:                        10.3   9.92  )-----------( 174      ( 31 Dec 2023 06:12 )             30.2     12-31    143  |  107  |  8   ----------------------------<  95  5.0   |  29  |  0.86    Ca    8.7      31 Dec 2023 06:12  Mg     2.3     12-31    TPro  5.7  /  Alb  2.3  /  TBili  0.2  /  DBili  x   /  AST  41  /  ALT  42  /  AlkPhos  51  12-31      PT/INR - ( 29 Dec 2023 16:45 )   PT: 12.7 sec;   INR: 1.09 ratio         PTT - ( 29 Dec 2023 16:45 )  PTT:31.3 sec  Lactate, Blood: 1.3 mmol/L (12-29 @ 16:45)      Urinalysis Basic - ( 31 Dec 2023 06:12 )    Color: x / Appearance: x / SG: x / pH: x  Gluc: 95 mg/dL / Ketone: x  / Bili: x / Urobili: x   Blood: x / Protein: x / Nitrite: x   Leuk Esterase: x / RBC: x / WBC x   Sq Epi: x / Non Sq Epi: x / Bacteria: x        Culture - Blood (collected 29 Dec 2023 17:00)  Source: .Blood Blood-Peripheral  Preliminary Report (30 Dec 2023 22:03):    No growth at 24 hours    Culture - Blood (collected 29 Dec 2023 16:45)  Source: .Blood Blood-Peripheral  Preliminary Report (30 Dec 2023 22:03):    No growth at 24 hours          RADIOLOGY & ADDITIONAL TESTS:    Care Discussed with Consultants/Other Providers:

## 2023-12-31 NOTE — END OF VISIT
[FreeTextEntry3] : All medical record entries made by the Scribe were at my, Dr. Lauren Shikowitz-Behr, MD, direction and personally dictated by me on 12/18/2023. I have reviewed the chart and agree that the record accurately reflects my personal performance of the history, physical exam, assessment and plan. I have also personally directed, reviewed, and agreed with the chart.

## 2023-12-31 NOTE — ADDENDUM
[FreeTextEntry1] : I, Forest Walsh, documented this note as a scribe on behalf of Dr. Lauren Shikowitz-Behr, MD on 12/18/2023.

## 2023-12-31 NOTE — HISTORY OF PRESENT ILLNESS
[FreeTextEntry1] : Lorena Nunez is a 53 year old female presenting for first postoperative visit s/p revision of bilateral breast reconstruction with  exchange of 600cc breast implants (right implant ruptured, left intact) with 500cc implants with exchange from a subpectoral to a prepectoral plane, creation of prepectoral pocket, repair of bilateral pectoralis muscle, bilateral capsulectomy, and placement of right alloderm for improved soft tissue support on 12/12/23. Patient is complaining of pruritus at the surgical incision sites, but otherwise notes improved feeling and contour of her upper chest. Her postsurgical pain is controlled.

## 2023-12-31 NOTE — PROGRESS NOTE ADULT - ASSESSMENT
Patient is a 53y old h/o depression, bipolar disorder, YOLIS on CPAP, left breast cancer (was on Tamoxifen till 2021) s/p bilateral mastectomy and reconstruction in 2013 with Dr. Boggs at [facility]. Patient presents with a chief complaint of right breast pain.    #Right breast cellulitis  #S/p Bilat breast reconstruct 12/23  #H/O bilat breast CA   #Leukocytosis-resolved  - left breast still with ANASTACIO drain in place  - Right breast Sono - No drainable collection. Diffuse subcutaneous edema with suggestion of subcutaneous emphysema.   - blood cultures pending    - IV antibiotics Vanco and Zosyn given in ER, will continue vanco and zosyn as per plastic sx   - IVF NS  - Plastics following  Dr. Orosco  - pain management, warm compress to right breast  - ID consult pending     #Hx/of Bipolar/Depression  - Continue home meds effexor, depakote and lamictal    #DVTppx:  - encourage ambulation   - SCDs  - does not meet criteria for dvt ppx injectable     12/31 Patient updated at bedside of plan, states she will update family  Patient is a 53y old h/o depression, bipolar disorder, YOLIS on CPAP, left breast cancer (was on Tamoxifen till 2021) s/p bilateral mastectomy and reconstruction in 2013 with Dr. Boggs at [facility]. Patient presents with a chief complaint of right breast pain.    #Right breast cellulitis  #S/p Bilat breast reconstruct 12/23  #H/O bilat breast CA   #Leukocytosis-resolved  - left breast still with ANASTACIO drain in place  - Right breast Sono - No drainable collection. Diffuse subcutaneous edema with suggestion of subcutaneous emphysema.   - blood cultures pending    - IV antibiotics Vanco and Zosyn given in ER, will continue vanco and zosyn as per plastic sx   - IVF NS  - Plastics following  Dr. Orosco  - pain management, warm compress to right breast  - ID consult pending     #Hx/of Bipolar/Depression  - Continue home meds effexor, depakote and lamictal    #DVTppx:  - encourage ambulation   - SCDs  - does not meet criteria for dvt ppx injectable     12/31 Patient updated at bedside of plan, states she will update family

## 2023-12-31 NOTE — PROGRESS NOTE ADULT - ASSESSMENT
Assessment and Plan:   s/p revisionary reconstructive breast cancer surgery, 12.12.23, complicated by right breast cellulitis (showing improvement on broad spectrum IV antibiotics)      plan:  Discussed with Dr Levy  continue broad spectrum antibiotics   await ID recommendations  appreciate medical  management  encourage water intake  ok to shower  OOB   tylenol for pain management   No labs tomorrow since leukocytosis resolved

## 2023-12-31 NOTE — PHYSICAL EXAM
[de-identified] : NAD, AxOx3  [de-identified] : nonlabored breathing  [de-identified] : incisions are clean, dry, and intact drain output is serosanguinous there is no evidence of bleeding, infection or skin breakdown  [de-identified] : no edema  [de-identified] : as above [de-identified] : normal affect

## 2023-12-31 NOTE — PROGRESS NOTE ADULT - SUBJECTIVE AND OBJECTIVE BOX
patient continues to deny fevers.   she states pain and erythema have improved.  her leukocytosis has resolved.    on exam, the right breast erythema and swelling have improved. the tenderness to palpation have improved.  the incision is intact.  there is no fluctuance or mass on palpation.    the left breast exam is unremarkable. drain output is serosanguinous.

## 2023-12-31 NOTE — PROGRESS NOTE ADULT - NS ATTEND AMEND GEN_ALL_CORE FT
right breast cellulitis   s/p recent revisional breast reconstruction   - cont with vancomycin and zosyn  - cultures pending  - ID consult  - appreciate plastic surgery right breast cellulitis   s/p recent revisional breast reconstruction   - cont with vancomycin and zosyn  - blood cx negative  - ID consult  - appreciate plastic surgery

## 2023-12-31 NOTE — PROGRESS NOTE ADULT - ASSESSMENT
s/p revisionary reconstructive breast cancer surgery, 12.12.23, complicated by right breast cellulitis (showing improvement on broad spectrum IV antibiotics)  plan:   continue broad spectrum antibiotics   await ID recommendations  appreciate medicine management  encourage water intake  ok to shower

## 2024-01-01 ENCOUNTER — TRANSCRIPTION ENCOUNTER (OUTPATIENT)
Age: 54
End: 2024-01-01

## 2024-01-01 PROBLEM — T85.49XA: Status: ACTIVE | Noted: 2020-04-16

## 2024-01-01 LAB
ANION GAP SERPL CALC-SCNC: 7 MMOL/L — SIGNIFICANT CHANGE UP (ref 5–17)
ANION GAP SERPL CALC-SCNC: 7 MMOL/L — SIGNIFICANT CHANGE UP (ref 5–17)
BUN SERPL-MCNC: 8 MG/DL — SIGNIFICANT CHANGE UP (ref 7–23)
BUN SERPL-MCNC: 8 MG/DL — SIGNIFICANT CHANGE UP (ref 7–23)
CALCIUM SERPL-MCNC: 8.9 MG/DL — SIGNIFICANT CHANGE UP (ref 8.4–10.5)
CALCIUM SERPL-MCNC: 8.9 MG/DL — SIGNIFICANT CHANGE UP (ref 8.4–10.5)
CHLORIDE SERPL-SCNC: 106 MMOL/L — SIGNIFICANT CHANGE UP (ref 96–108)
CHLORIDE SERPL-SCNC: 106 MMOL/L — SIGNIFICANT CHANGE UP (ref 96–108)
CO2 SERPL-SCNC: 29 MMOL/L — SIGNIFICANT CHANGE UP (ref 22–31)
CO2 SERPL-SCNC: 29 MMOL/L — SIGNIFICANT CHANGE UP (ref 22–31)
CREAT SERPL-MCNC: 0.77 MG/DL — SIGNIFICANT CHANGE UP (ref 0.5–1.3)
CREAT SERPL-MCNC: 0.77 MG/DL — SIGNIFICANT CHANGE UP (ref 0.5–1.3)
EGFR: 93 ML/MIN/1.73M2 — SIGNIFICANT CHANGE UP
EGFR: 93 ML/MIN/1.73M2 — SIGNIFICANT CHANGE UP
GLUCOSE SERPL-MCNC: 115 MG/DL — HIGH (ref 70–99)
GLUCOSE SERPL-MCNC: 115 MG/DL — HIGH (ref 70–99)
HCT VFR BLD CALC: 33.3 % — LOW (ref 34.5–45)
HCT VFR BLD CALC: 33.3 % — LOW (ref 34.5–45)
HGB BLD-MCNC: 11.2 G/DL — LOW (ref 11.5–15.5)
HGB BLD-MCNC: 11.2 G/DL — LOW (ref 11.5–15.5)
MCHC RBC-ENTMCNC: 32.4 PG — SIGNIFICANT CHANGE UP (ref 27–34)
MCHC RBC-ENTMCNC: 32.4 PG — SIGNIFICANT CHANGE UP (ref 27–34)
MCHC RBC-ENTMCNC: 33.6 GM/DL — SIGNIFICANT CHANGE UP (ref 32–36)
MCHC RBC-ENTMCNC: 33.6 GM/DL — SIGNIFICANT CHANGE UP (ref 32–36)
MCV RBC AUTO: 96.2 FL — SIGNIFICANT CHANGE UP (ref 80–100)
MCV RBC AUTO: 96.2 FL — SIGNIFICANT CHANGE UP (ref 80–100)
NRBC # BLD: 0 /100 WBCS — SIGNIFICANT CHANGE UP (ref 0–0)
NRBC # BLD: 0 /100 WBCS — SIGNIFICANT CHANGE UP (ref 0–0)
PLATELET # BLD AUTO: 230 K/UL — SIGNIFICANT CHANGE UP (ref 150–400)
PLATELET # BLD AUTO: 230 K/UL — SIGNIFICANT CHANGE UP (ref 150–400)
POTASSIUM SERPL-MCNC: 4.9 MMOL/L — SIGNIFICANT CHANGE UP (ref 3.5–5.3)
POTASSIUM SERPL-MCNC: 4.9 MMOL/L — SIGNIFICANT CHANGE UP (ref 3.5–5.3)
POTASSIUM SERPL-SCNC: 4.9 MMOL/L — SIGNIFICANT CHANGE UP (ref 3.5–5.3)
POTASSIUM SERPL-SCNC: 4.9 MMOL/L — SIGNIFICANT CHANGE UP (ref 3.5–5.3)
RBC # BLD: 3.46 M/UL — LOW (ref 3.8–5.2)
RBC # BLD: 3.46 M/UL — LOW (ref 3.8–5.2)
RBC # FLD: 12.7 % — SIGNIFICANT CHANGE UP (ref 10.3–14.5)
RBC # FLD: 12.7 % — SIGNIFICANT CHANGE UP (ref 10.3–14.5)
SODIUM SERPL-SCNC: 142 MMOL/L — SIGNIFICANT CHANGE UP (ref 135–145)
SODIUM SERPL-SCNC: 142 MMOL/L — SIGNIFICANT CHANGE UP (ref 135–145)
VANCOMYCIN TROUGH SERPL-MCNC: 6.5 UG/ML — LOW (ref 10–20)
VANCOMYCIN TROUGH SERPL-MCNC: 6.5 UG/ML — LOW (ref 10–20)
WBC # BLD: 9.2 K/UL — SIGNIFICANT CHANGE UP (ref 3.8–10.5)
WBC # BLD: 9.2 K/UL — SIGNIFICANT CHANGE UP (ref 3.8–10.5)
WBC # FLD AUTO: 9.2 K/UL — SIGNIFICANT CHANGE UP (ref 3.8–10.5)
WBC # FLD AUTO: 9.2 K/UL — SIGNIFICANT CHANGE UP (ref 3.8–10.5)

## 2024-01-01 PROCEDURE — 99024 POSTOP FOLLOW-UP VISIT: CPT

## 2024-01-01 PROCEDURE — 99231 SBSQ HOSP IP/OBS SF/LOW 25: CPT

## 2024-01-01 RX ORDER — POLYETHYLENE GLYCOL 3350 17 G/17G
17 POWDER, FOR SOLUTION ORAL
Refills: 0 | Status: DISCONTINUED | OUTPATIENT
Start: 2024-01-01 | End: 2024-01-02

## 2024-01-01 RX ADMIN — Medication 250 MILLIGRAM(S): at 05:05

## 2024-01-01 RX ADMIN — Medication 75 MILLIGRAM(S): at 09:10

## 2024-01-01 RX ADMIN — POLYETHYLENE GLYCOL 3350 17 GRAM(S): 17 POWDER, FOR SOLUTION ORAL at 18:19

## 2024-01-01 RX ADMIN — DIVALPROEX SODIUM 1250 MILLIGRAM(S): 500 TABLET, DELAYED RELEASE ORAL at 22:28

## 2024-01-01 RX ADMIN — PIPERACILLIN AND TAZOBACTAM 25 GRAM(S): 4; .5 INJECTION, POWDER, LYOPHILIZED, FOR SOLUTION INTRAVENOUS at 09:20

## 2024-01-01 RX ADMIN — PIPERACILLIN AND TAZOBACTAM 25 GRAM(S): 4; .5 INJECTION, POWDER, LYOPHILIZED, FOR SOLUTION INTRAVENOUS at 01:26

## 2024-01-01 RX ADMIN — SENNA PLUS 2 TABLET(S): 8.6 TABLET ORAL at 22:29

## 2024-01-01 RX ADMIN — LAMOTRIGINE 200 MILLIGRAM(S): 25 TABLET, ORALLY DISINTEGRATING ORAL at 22:29

## 2024-01-01 RX ADMIN — POLYETHYLENE GLYCOL 3350 17 GRAM(S): 17 POWDER, FOR SOLUTION ORAL at 11:02

## 2024-01-01 RX ADMIN — Medication 250 MILLIGRAM(S): at 18:18

## 2024-01-01 RX ADMIN — PIPERACILLIN AND TAZOBACTAM 25 GRAM(S): 4; .5 INJECTION, POWDER, LYOPHILIZED, FOR SOLUTION INTRAVENOUS at 18:25

## 2024-01-01 NOTE — PROGRESS NOTE ADULT - SUBJECTIVE AND OBJECTIVE BOX
F/U Note:    53y Female admitted with right breast cellulitis after prior mastectomy    Interval Hx:    -erythema has NOT extended past the outline of skin marker placed days ago    Vital Signs Last 24 Hrs  T(C): 36.5 (01 Jan 2024 20:04), Max: 36.8 (31 Dec 2023 20:56)  T(F): 97.7 (01 Jan 2024 20:04), Max: 98.2 (31 Dec 2023 20:56)  HR: 60 (01 Jan 2024 20:04) (60 - 71)  BP: 118/75 (01 Jan 2024 20:04) (118/61 - 120/99)  BP(mean): --  RR: 14 (01 Jan 2024 20:04) (14 - 18)  SpO2: 100% (01 Jan 2024 20:04) (97% - 100%)    Parameters below as of 01 Jan 2024 20:04  Patient On (Oxygen Delivery Method): room air                                11.2   9.20  )-----------( 230      ( 01 Jan 2024 06:00 )             33.3         01-01    142  |  106  |  8   ----------------------------<  115<H>  4.9   |  29  |  0.77    Ca    8.9      01 Jan 2024 06:00  Mg     2.3     12-31    TPro  5.7<L>  /  Alb  2.3<L>  /  TBili  0.2  /  DBili  x   /  AST  41<H>  /  ALT  42  /  AlkPhos  51  12-31        NEURO: no headaches, blurry vision, tremors, depression, anxity  CV: no chest pain, palpitations, murmurs, orthopnea  Resp: no shortness of breath, cough, wheeze, sputum production  GI: no stomach pain,nausea, vomitting, flatulence, hematemesis, hematochezia  PV: no swelling of extremities, no hair loss, no coolness to extremities  ENDO: no polydypsia, polyphagia, polyuria, weight loss, night sweats          NEURO: awake and alert  BREAST: right breast with erythema, improved  CV: (+) S1/S2, rrr, no mrg  RESP: CTA b/l  GI: soft, non tender

## 2024-01-01 NOTE — PROGRESS NOTE ADULT - SUBJECTIVE AND OBJECTIVE BOX
Patient is a 53y old Female who presents with a chief complaint of pain and erythema right breast (01 Jan 2024 08:22)      Patient seen and examined at bedside. Yesterday evening, patient complained of redness and increasing pain in R breast, which was examined by myself and NIGEL Espana. Patient states she feels better this morning and redness has reduced.    ALLERGIES:  bacitracin (Rash)  latex (Unknown)    MEDICATIONS  (STANDING):  divalproex DR 1250 milliGRAM(s) Oral at bedtime  lamoTRIgine 200 milliGRAM(s) Oral at bedtime  piperacillin/tazobactam IVPB.. 3.375 Gram(s) IV Intermittent every 8 hours  polyethylene glycol 3350 17 Gram(s) Oral daily  senna 2 Tablet(s) Oral at bedtime  sodium chloride 0.9%. 1000 milliLiter(s) (75 mL/Hr) IV Continuous <Continuous>  vancomycin  IVPB 1000 milliGRAM(s) IV Intermittent every 12 hours  venlafaxine 75 milliGRAM(s) Oral with breakfast    MEDICATIONS  (PRN):  acetaminophen     Tablet .. 650 milliGRAM(s) Oral every 6 hours PRN Temp greater or equal to 38C (100.4F), Mild Pain (1 - 3), Moderate Pain (4 - 6)  ondansetron Injectable 4 milliGRAM(s) IV Push every 6 hours PRN Nausea and/or Vomiting    Vital Signs Last 24 Hrs  T(F): 98.2 (31 Dec 2023 20:56), Max: 98.2 (31 Dec 2023 20:56)  HR: 68 (31 Dec 2023 20:56) (68 - 72)  BP: 118/61 (31 Dec 2023 20:56) (93/49 - 118/61)  RR: 17 (31 Dec 2023 20:56) (17 - 17)  SpO2: 97% (31 Dec 2023 20:56) (97% - 97%)  I&O's Summary    31 Dec 2023 07:01  -  01 Jan 2024 07:00  --------------------------------------------------------  IN: 0 mL / OUT: 30 mL / NET: -30 mL      PHYSICAL EXAM:  General: NAD, A/O x 3  ENT: No gross hearing impairment, Moist mucous membranes, no thrush  Neck: Supple, No JVD  Lungs: Clear to auscultation bilaterally, good air entry, non-labored breathing  Cardio: RRR, S1/S2, No murmur  Abdomen: Soft, Nontender, Nondistended; Bowel sounds present  Breast: R breast minimal erythema, improved from yesterday, L breast ANASTACIO drain in place  Extremities: No calf tenderness, No cyanosis, No pitting edema  Psych: Appropriate mood and affect    LABS:                        11.2   9.20  )-----------( 230      ( 01 Jan 2024 06:00 )             33.3     01-01    142  |  106  |  8   ----------------------------<  115  4.9   |  29  |  0.77    Ca    8.9      01 Jan 2024 06:00  Mg     2.3     12-31    TPro  5.7  /  Alb  2.3  /  TBili  0.2  /  DBili  x   /  AST  41  /  ALT  42  /  AlkPhos  51  12-31          PT/INR - ( 29 Dec 2023 16:45 )   PT: 12.7 sec;   INR: 1.09 ratio         PTT - ( 29 Dec 2023 16:45 )  PTT:31.3 sec  Lactate, Blood: 1.3 mmol/L (12-29 @ 16:45)                            Urinalysis Basic - ( 01 Jan 2024 06:00 )    Color: x / Appearance: x / SG: x / pH: x  Gluc: 115 mg/dL / Ketone: x  / Bili: x / Urobili: x   Blood: x / Protein: x / Nitrite: x   Leuk Esterase: x / RBC: x / WBC x   Sq Epi: x / Non Sq Epi: x / Bacteria: x        Culture - Blood (collected 29 Dec 2023 17:00)  Source: .Blood Blood-Peripheral  Preliminary Report (31 Dec 2023 22:02):    No growth at 48 Hours    Culture - Blood (collected 29 Dec 2023 16:45)  Source: .Blood Blood-Peripheral  Preliminary Report (31 Dec 2023 22:02):    No growth at 48 Hours        RADIOLOGY & ADDITIONAL TESTS:    Care Discussed with Consultants/Other Providers:    Patient is a 53y old Female who presents with a chief complaint of pain and erythema right breast      Patient seen and examined at bedside. Yesterday evening, patient complained of redness and increasing pain in R breast, which was examined by myself and NIGEL Espana. Patient states she feels better this morning and redness has reduced.    ALLERGIES:  bacitracin (Rash)  latex (Unknown)    MEDICATIONS  (STANDING):  divalproex DR 1250 milliGRAM(s) Oral at bedtime  lamoTRIgine 200 milliGRAM(s) Oral at bedtime  piperacillin/tazobactam IVPB.. 3.375 Gram(s) IV Intermittent every 8 hours  polyethylene glycol 3350 17 Gram(s) Oral daily  senna 2 Tablet(s) Oral at bedtime  sodium chloride 0.9%. 1000 milliLiter(s) (75 mL/Hr) IV Continuous <Continuous>  vancomycin  IVPB 1000 milliGRAM(s) IV Intermittent every 12 hours  venlafaxine 75 milliGRAM(s) Oral with breakfast    MEDICATIONS  (PRN):  acetaminophen     Tablet .. 650 milliGRAM(s) Oral every 6 hours PRN Temp greater or equal to 38C (100.4F), Mild Pain (1 - 3), Moderate Pain (4 - 6)  ondansetron Injectable 4 milliGRAM(s) IV Push every 6 hours PRN Nausea and/or Vomiting    Vital Signs Last 24 Hrs  T(F): 98.2 (31 Dec 2023 20:56), Max: 98.2 (31 Dec 2023 20:56)  HR: 68 (31 Dec 2023 20:56) (68 - 72)  BP: 118/61 (31 Dec 2023 20:56) (93/49 - 118/61)  RR: 17 (31 Dec 2023 20:56) (17 - 17)  SpO2: 97% (31 Dec 2023 20:56) (97% - 97%)  I&O's Summary    31 Dec 2023 07:01  -  01 Jan 2024 07:00  --------------------------------------------------------  IN: 0 mL / OUT: 30 mL / NET: -30 mL      PHYSICAL EXAM:  General: NAD, A/O x 3  ENT: No gross hearing impairment, Moist mucous membranes, no thrush  Neck: Supple, No JVD  Lungs: Clear to auscultation bilaterally, good air entry, non-labored breathing  Cardio: RRR, S1/S2, No murmur  Abdomen: Soft, Nontender, Nondistended; Bowel sounds present  Breast: R breast minimal erythema, improved from yesterday, L breast ANASTACIO drain in place  Extremities: No calf tenderness, No cyanosis, No pitting edema  Psych: Appropriate mood and affect    LABS:                        11.2   9.20  )-----------( 230      ( 01 Jan 2024 06:00 )             33.3     01-01    142  |  106  |  8   ----------------------------<  115  4.9   |  29  |  0.77    Ca    8.9      01 Jan 2024 06:00  Mg     2.3     12-31    TPro  5.7  /  Alb  2.3  /  TBili  0.2  /  DBili  x   /  AST  41  /  ALT  42  /  AlkPhos  51  12-31          PT/INR - ( 29 Dec 2023 16:45 )   PT: 12.7 sec;   INR: 1.09 ratio         PTT - ( 29 Dec 2023 16:45 )  PTT:31.3 sec  Lactate, Blood: 1.3 mmol/L (12-29 @ 16:45)                            Urinalysis Basic - ( 01 Jan 2024 06:00 )    Color: x / Appearance: x / SG: x / pH: x  Gluc: 115 mg/dL / Ketone: x  / Bili: x / Urobili: x   Blood: x / Protein: x / Nitrite: x   Leuk Esterase: x / RBC: x / WBC x   Sq Epi: x / Non Sq Epi: x / Bacteria: x        Culture - Blood (collected 29 Dec 2023 17:00)  Source: .Blood Blood-Peripheral  Preliminary Report (31 Dec 2023 22:02):    No growth at 48 Hours    Culture - Blood (collected 29 Dec 2023 16:45)  Source: .Blood Blood-Peripheral  Preliminary Report (31 Dec 2023 22:02):    No growth at 48 Hours        RADIOLOGY & ADDITIONAL TESTS:    Care Discussed with Consultants/Other Providers:

## 2024-01-01 NOTE — DISCHARGE NOTE PROVIDER - HOSPITAL COURSE
Hospital Course  HPI:  Patient is a 53y old h/o depression, bipolar disorder, YOLIS on CPAP, left breast cancer (was on Tamoxifen till 2021) s/p bilateral mastectomy and reconstruction in 2013 with Dr. Boggs at [facility].  Female who presents with a chief complaint of right breast pain. Patient is s/p Revision of bilateral breast reconstruction, exchange of breast implants, creation of prepectoral pocket, repair of bilateral pectoralis muscle, bilateral capsulectomy, placement of right alloderm on 12/12/23. Patient was seen the office 12/28 by Dr. Shikowitz -Behr and had right drain removed d/t decreased output, left drain remains in place. Patient called office this AM c/o right breast pain, swelling and warmth and was then evaluated by Dr. Shikowitz Behr. Patient was sent to [facility] for Interventional Radiology to see if there was a possible drainable collection, per MD nothing to be drained. Patient arrives at [place] [facility] for septic workup, admission to medical team, ID evaluation and IV antibiotics  (29 Dec 2023 17:49)    Upon admission, blood cultures were drawn, NGTD. Patient was started on IV vanco and zosyn. Plastic surgery was consulted/ following. Leukocytosis resolved. ID consulted, recommend_____.      You were admitted for R breast swelling and pain  You were diagnosed with cellulitis  You were treated with IV antibiotics  You were prescribed the following new medications:    You will need to follow up with your primary care physician.    Source of Infection:  Antibiotic / Last Day:    Palliative Care / Advanced Care Planning  Code Status:  Patient/Family agreeable to Hospice/Palliative (Y/N)?  Summary of Goals of Care Conversation:    Discharging Provider:    Contact Info: [phone] - Please call with any questions or concerns.    Outpatient Provider:     SNF Provider: Hospital Course  HPI:  Patient is a 53y old h/o depression, bipolar disorder, YOLIS on CPAP, left breast cancer (was on Tamoxifen till 2021) s/p bilateral mastectomy and reconstruction in 2013 with Dr. Boggs at [facility].  Female who presents with a chief complaint of right breast pain. Patient is s/p Revision of bilateral breast reconstruction, exchange of breast implants, creation of prepectoral pocket, repair of bilateral pectoralis muscle, bilateral capsulectomy, placement of right alloderm on 12/12/23. Patient was seen the office 12/28 by Dr. Shikowitz -Behr and had right drain removed d/t decreased output, left drain remains in place. Patient called office this AM c/o right breast pain, swelling and warmth and was then evaluated by Dr. Shikowitz Behr. Patient was sent to [facility] for Interventional Radiology to see if there was a possible drainable collection, per MD nothing to be drained. Patient arrives at [place] [facility] for septic workup, admission to medical team, ID evaluation and IV antibiotics  (29 Dec 2023 17:49)    Upon admission, blood cultures were drawn, NGTD. Patient was started on IV vanco and zosyn. Plastic surgery was consulted/ following. Leukocytosis resolved. ID consulted, recommend_____.      You were admitted for R breast swelling and pain  You were diagnosed with cellulitis  You were treated with IV antibiotics  You were prescribed the following new medications:    You will need to follow up with your primary care physician.    Source of Infection:  Antibiotic / Last Day:    Palliative Care / Advanced Care Planning  Code Status:  Patient/Family agreeable to Hospice/Palliative (Y/N)?  Summary of Goals of Care Conversation:    Discharging Provider:    Contact Info: [phone] - Please call with any questions or concerns.    Outpatient Provider:     SNF Provider: Hospital Course  HPI:  Patient is a 53y old h/o depression, bipolar disorder, YOLIS on CPAP, left breast cancer (was on Tamoxifen till 2021) s/p bilateral mastectomy and reconstruction in 2013 with Dr. Boggs at [facility].  Female who presents with a chief complaint of right breast pain. Patient is s/p Revision of bilateral breast reconstruction, exchange of breast implants, creation of prepectoral pocket, repair of bilateral pectoralis muscle, bilateral capsulectomy, placement of right alloderm on 12/12/23. Patient was seen the office 12/28 by Dr. Shikowitz -Behr and had right drain removed d/t decreased output, left drain remains in place. Patient called office this AM c/o right breast pain, swelling and warmth and was then evaluated by Dr. Shikowitz Behr. Patient was sent to [facility] for Interventional Radiology to see if there was a possible drainable collection, per MD nothing to be drained. Patient arrives at [place] [facility] for septic workup, admission to medical team, ID evaluation and IV antibiotics  (29 Dec 2023 17:49)    Upon admission, blood cultures were drawn, NGTD. Patient was started on IV vanco and zosyn. Plastic surgery was consulted/ following. Leukocytosis resolved. ID consulted, recommend_____.      You were admitted for R breast swelling and pain  You were diagnosed with cellulitis  You were treated with IV antibiotics  You were prescribed the following new medications:    You will need to follow up with your primary care physician.    Source of Infection: cellulitis of right mastectomy site   Antibiotic / Last Day:    Palliative Care / Advanced Care Planning  Code Status: Full Code  Patient/Family agreeable to Hospice/Palliative (Y/N)?  Summary of Goals of Care Conversation:    Discharging Provider:  Megan MANNING   Contact Info: [phone] - Please call with any questions or concerns.    Outpatient Provider: Shikowitz -Behr     Hospital Course  HPI:  Patient is a 53y old h/o depression, bipolar disorder, YLOIS on CPAP, left breast cancer (was on Tamoxifen till 2021) s/p bilateral mastectomy and reconstruction in 2013 with Dr. Boggs at [facility].  Female who presents with a chief complaint of right breast pain. Patient is s/p Revision of bilateral breast reconstruction, exchange of breast implants, creation of prepectoral pocket, repair of bilateral pectoralis muscle, bilateral capsulectomy, placement of right alloderm on 12/12/23. Patient was seen the office 12/28 by Dr. Shikowitz -Behr and had right drain removed d/t decreased output, left drain remains in place. Patient called office this AM c/o right breast pain, swelling and warmth and was then evaluated by Dr. Shikowitz Behr. Patient was sent to [facility] for Interventional Radiology to see if there was a possible drainable collection, per MD nothing to be drained. Patient arrives at [place] [facility] for septic workup, admission to medical team, ID evaluation and IV antibiotics  (29 Dec 2023 17:49)    Upon admission, blood cultures were drawn, NGTD. Patient was started on IV vanco and zosyn. Plastic surgery was consulted/ following. Leukocytosis resolved. ID consulted, recommend_____.      You were admitted for R breast swelling and pain  You were diagnosed with cellulitis  You were treated with IV antibiotics  You were prescribed the following new medications:    You will need to follow up with your primary care physician.    Source of Infection: cellulitis of right mastectomy site   Antibiotic / Last Day:    Palliative Care / Advanced Care Planning  Code Status: Full Code  Patient/Family agreeable to Hospice/Palliative (Y/N)?  Summary of Goals of Care Conversation:    Discharging Provider:  Megan MANNING   Contact Info: [phone] - Please call with any questions or concerns.    Outpatient Provider: Shikowitz -Behr     Hospital Course  HPI:  Patient is a 53y old h/o depression, bipolar disorder, YOLIS on CPAP, left breast cancer (was on Tamoxifen till 2021) s/p bilateral mastectomy and reconstruction in 2013 with Dr. Boggs at [facility].  Female who presents with a chief complaint of right breast pain. Patient is s/p Revision of bilateral breast reconstruction, exchange of breast implants, creation of prepectoral pocket, repair of bilateral pectoralis muscle, bilateral capsulectomy, placement of right alloderm on 12/12/23. Patient was seen the office 12/28 by Dr. Shikowitz -Behr and had right drain removed d/t decreased output, left drain remains in place. Patient called office this AM c/o right breast pain, swelling and warmth and was then evaluated by Dr. Shikowitz Behr. Patient was sent to [facility] for Interventional Radiology to see if there was a possible drainable collection, per MD nothing to be drained. Patient arrives at [place] [facility] for septic workup, admission to medical team, ID evaluation and IV antibiotics  (29 Dec 2023 17:49)    Upon admission, blood cultures were drawn, NGTD. Patient was started on IV vanco and zosyn. Plastic surgery was consulted/ following. Leukocytosis resolved. ID consulted, recommend Cefdinir 300mg po BID for 9 more days. f/u with surgeon as directed       You were admitted for R breast swelling and pain  You were diagnosed with cellulitis  You were treated with IV antibiotics and warm soaks, ID consult   You were prescribed the following new medications: Cefdinir 300mg po BID for 9more days    You will need to follow up with your primary care physician.    Source of Infection: cellulitis of right mastectomy site   Antibiotic / Last Day:Cefdinir 300mg po BID for 9more days    Palliative Care / Advanced Care Planning  Code Status: Full Code  Patient/Family agreeable to Hospice/Palliative (Y/N)?  Summary of Goals of Care Conversation:    Discharging Provider:  Megan MANNING   Contact Info: [phone] - Please call with any questions or concerns.    Outpatient Provider: Shikowitz -Behr     Hospital Course  HPI:  Patient is a 53y old h/o depression, bipolar disorder, YOLIS on CPAP, left breast cancer (was on Tamoxifen till 2021) s/p bilateral mastectomy and reconstruction in 2013 with Dr. Boggs at [facility].  Female who presents with a chief complaint of right breast pain. Patient is s/p Revision of bilateral breast reconstruction, exchange of breast implants, creation of prepectoral pocket, repair of bilateral pectoralis muscle, bilateral capsulectomy, placement of right alloderm on 12/12/23. Patient was seen the office 12/28 by Dr. Shikowitz -Behr and had right drain removed d/t decreased output, left drain remains in place. Patient called office this AM c/o right breast pain, swelling and warmth and was then evaluated by Dr. Shikowitz Behr. Patient was sent to [facility] for Interventional Radiology to see if there was a possible drainable collection, per MD nothing to be drained. Patient arrives at [place] [facility] for septic workup, admission to medical team, ID evaluation and IV antibiotics  (29 Dec 2023 17:49)    Upon admission, blood cultures were drawn, NGTD. Patient was started on IV vanco and zosyn. Plastic surgery was consulted/ following. Leukocytosis resolved. ID consulted, recommend Cefdinir 300mg po BID for 9 more days. f/u with surgeon as directed       You were admitted for R breast swelling and pain  You were diagnosed with cellulitis  You were treated with IV antibiotics and warm soaks, ID consult   You were prescribed the following new medications: Cefdinir 300mg po BID for 9more days    You will need to follow up with your primary care physician.    Source of Infection: cellulitis of right mastectomy site   Antibiotic / Last Day:Cefdinir 300mg po BID for 9more days    Palliative Care / Advanced Care Planning  Code Status: Full Code  Patient/Family agreeable to Hospice/Palliative (Y/N)?  Summary of Goals of Care Conversation:    Discharging Provider:  Megan MANNING   Contact Info: [phone] - Please call with any questions or concerns.    Outpatient Provider: Shikowitz -Behr

## 2024-01-01 NOTE — DISCHARGE NOTE PROVIDER - NSDCCPCAREPLAN_GEN_ALL_CORE_FT
PRINCIPAL DISCHARGE DIAGNOSIS  Diagnosis: Cellulitis, wound, post-operative  Assessment and Plan of Treatment: You were admitted for R breast swelling and pain  You were diagnosed with cellulitis  You were treated with IV antibiotics and warm soaks, ID consult   You were prescribed the following new medications: Cefdinir 300mg po BID for 9 more days  Follow up with Dr. Shikowitz -Behr this week

## 2024-01-01 NOTE — PROGRESS NOTE ADULT - ASSESSMENT
53y Female admitted with right breast cellulitis after prior mastectomy    Interval Hx:    -erythema has NOT extended past the outline of skin marker placed days ago          PLAN:    -serial exams, follow for signs of expanding erythema  -pain control  -finish course of vanco/zosyn  -surgical team following  -AM labs  -DVT prophylaxis          TIME SPENT:  35 minutes of  time spent providing medical care for patient's acute illness/conditions that impairs at least one vital organ system and/or poses a high risk of imminent or life threatening deterioration in the patient's condition. It includes time spent evaluating and treating the patient's acute illness as well as time spent reviewing labs, radiology, discussing goals of care with patient and/or patient's family, and discussing the case with a multidisciplinary team, including the eICU, in an effort to prevent further life threatening deterioration or end organ damage. This time is independent of any procedures performed.    DATE OF ENTRY OF THIS NOTE IS EQUAL TO THE DATE OF SERVICES RENDERED

## 2024-01-01 NOTE — PROGRESS NOTE ADULT - SUBJECTIVE AND OBJECTIVE BOX
Patient continues to feel better.  She denies fevers.  WBC count normal.    On exam, the right breast erythema and swelling have improved.  The tenderness to palpation is almost resolved.  There is no fluctuance. Incision is intact.   Left breast exam unremarkable.

## 2024-01-01 NOTE — HISTORY OF PRESENT ILLNESS
[FreeTextEntry1] : Lorena Nunez is a 53 year old female 10 days s/p revision of bilateral breast reconstruction with exchange of 600cc breast implants (right implant ruptured, left intact) with 500cc implants, with exchange from a subpectoral to a prepectoral plane; creation of prepectoral pocket; repair of bilateral pectoralis muscle; bilateral capsulectomy; and placement of right alloderm for improved soft tissue support on 12/12/23. Patient complaining of some mild discomfort at the surgical sites. Drain output was not documented correctly; nonetheless, as per patient, drains do not meet criteria for removal.

## 2024-01-01 NOTE — DISCHARGE NOTE PROVIDER - NSDCFUSCHEDAPPT_GEN_ALL_CORE_FT
Shikowitz-Behr, Lauren B  [facility] Physician [facility]  [address]  Scheduled Appointment: 01/02/2024     Shikowitz-Behr, Lauren B  [facility]  [address]  Scheduled Appointment: 01/02/2024

## 2024-01-01 NOTE — ADDENDUM
[FreeTextEntry1] : I, Forest Walsh, documented this note as a scribe on behalf of Dr. Lauren Shikowitz-Behr, MD on 12/22/2023.

## 2024-01-01 NOTE — DISCHARGE NOTE PROVIDER - CARE PROVIDER_API CALL
Shikowitz-Behr, Lauren [facility]  Plastic Surgery  [address] [place] [id number]  Phone: [phone]  Fax: [phone]  Follow Up Time:    Shikowitz-Behr, Lauren [facility]  Plastic Surgery  [address] [place] [id number]  Phone: [phone]  Fax: [phone]  Follow Up Time:

## 2024-01-01 NOTE — DISCHARGE NOTE PROVIDER - NSDCMRMEDTOKEN_GEN_ALL_CORE_FT
Adderall 5 mg oral tablet: 1 tab(s) orally once a day (in the morning)  Depakote 250 mg oral delayed release tablet: 5 tab(s) orally once a day (at bedtime)  Effexor 75 mg oral tablet: 1 tab(s) orally once a day (at bedtime)  LaMICtal 100 mg oral tablet: 2 tab(s) orally once a day (at bedtime)   Adderall 5 mg oral tablet: 1 tab(s) orally once a day (in the morning)  cefdinir 300 mg oral capsule: 1 cap(s) orally 2 times a day  Depakote 250 mg oral delayed release tablet: 5 tab(s) orally once a day (at bedtime)  Effexor 75 mg oral tablet: 1 tab(s) orally once a day (at bedtime)  LaMICtal 100 mg oral tablet: 2 tab(s) orally once a day (at bedtime)

## 2024-01-01 NOTE — PROGRESS NOTE ADULT - NS ATTEND AMEND GEN_ALL_CORE FT
right breast cellulitis  S/p revisional bilateral breast reconstruct 12/23  - cellulitis improving, leukocytosis resolved  - cont with vancomycin and zosyn  - no surgical intervention  - blood culture negative

## 2024-01-01 NOTE — PHYSICAL EXAM
[de-identified] : NAD, AxOx3 [de-identified] : nonlabored breathing [de-identified] : bilateral breast incisions are  clean, dry, and intact no evidence of infection, fluid collection, or skin breakdown  drains are serous in output

## 2024-01-01 NOTE — END OF VISIT
[FreeTextEntry3] : All medical record entries made by the Scribe were at my, Dr. Lauren Shikowitz-Behr, MD, direction and personally dictated by me on 12/22/2023. I have reviewed the chart and agree that the record accurately reflects my personal performance of the history, physical exam, assessment and plan. I have also personally directed, reviewed, and agreed with the chart.

## 2024-01-01 NOTE — PROGRESS NOTE ADULT - ASSESSMENT
Assessment Improving right breast cellulitis    Plan:  await ID recommendation for dispo planning  patient understands that infection can recur, worsen, or fail to resolve  we discussed alternatives including:  a. surgical exploration, washout, and implant removal  b. surgical exploration, washout, and explant  c. surgical exploration, washout, and autologous reconstruction  Timing of each option was extensively discussed.  In addition, associated indications, risks, and recovery were reviewed.

## 2024-01-01 NOTE — PROGRESS NOTE ADULT - ASSESSMENT
Patient is a 53y old h/o depression, bipolar disorder, YOLIS on CPAP, left breast cancer (was on Tamoxifen till 2021) s/p bilateral mastectomy and reconstruction in 2013 with Dr. Boggs at [facility]. Patient presents with a chief complaint of right breast pain.    #Right breast cellulitis  #S/p Bilat breast reconstruct 12/23  #H/O bilat breast CA   #Leukocytosis-resolved  - left breast still with ANASTACIO drain in place  - Right breast Sono - No drainable collection. Diffuse subcutaneous edema with suggestion of subcutaneous emphysema.   - blood cultures no growth at 48hrs  - IV antibiotics Vanco and Zosyn given in ER, will continue vanco and zosyn as per plastic sx   - IVF NS  - Plastics following  Dr. Cooper  - pain management, warm compress to right breast  - ID consult pending    #Hx/of Bipolar/Depression  - Continue home meds effexor, depakote and lamictal    #DVTppx:  - encourage ambulation   - SCDs  - does not meet criteria for dvt ppx injectable     1/1 Patient updated at bedside of plan, states she will update family  Patient is a 53y old h/o depression, bipolar disorder, YOLIS on CPAP, left breast cancer (was on Tamoxifen till 2021) s/p bilateral mastectomy and reconstruction in 2013 with Dr. Boggs at [facility]. Patient presents with a chief complaint of right breast pain.    #Right breast cellulitis  #S/p Bilat breast reconstruct 12/23  #H/O bilat breast CA   #Leukocytosis-resolved  - left breast still with ANASTACIO drain in place  - Right breast Sono - No drainable collection. Diffuse subcutaneous edema with suggestion of subcutaneous emphysema.   - blood cultures no growth at 48hrs  - IV antibiotics Vanco and Zosyn given in ER, will continue vanco and zosyn as per plastic sx   - IVF NS  - Plastics following  Dr. Cooper  - pain management, warm compress to right breast  - ID consult pending    #Hx/of Bipolar/Depression  - Continue home meds effexor, depakote and lamictal    #DVTppx:  - encourage ambulation   - SCDs  - does not meet criteria for dvt ppx injectable     1/1 Patient updated at bedside of plan, states she will update family  Patient is a 53y old h/o depression, bipolar disorder, YOLIS on CPAP, left breast cancer (was on Tamoxifen till 2021) s/p bilateral mastectomy and reconstruction in 2013 with Dr. Boggs at [facility]. Patient presents with a chief complaint of right breast pain.    #Right breast cellulitis  #S/p Bilat breast reconstruct 12/23  #H/O bilat breast CA   #Leukocytosis-resolved  - left breast still with ANASTACIO drain in place  - Right breast Sono - No drainable collection. Diffuse subcutaneous edema with suggestion of subcutaneous emphysema.   - blood cultures no growth at 48hrs  - IV antibiotics Vanco and Zosyn given in ER, will continue vanco and zosyn as per plastic sx   - IVF NS  - Plastics following  Dr. Cooper  - pain management, warm compress to right breast  - ID consult Saw pt recommended 9 days of Cefdinir 300mg pobid     #Hx/of Bipolar/Depression  - Continue home meds effexor, depakote and lamictal    #DVTppx:  - encourage ambulation   - SCDs  - does not meet criteria for dvt ppx injectable     1/1 Patient updated at bedside of plan, states she will update family  Patient is a 53y old h/o depression, bipolar disorder, YOLIS on CPAP, left breast cancer (was on Tamoxifen till 2021) s/p bilateral mastectomy and reconstruction in 2013 with Dr. Boggs at [facility]. Patient presents with a chief complaint of right breast pain.    #Right breast cellulitis  #S/p Bilat breast reconstruct 12/23  #H/O bilat breast CA   #Leukocytosis-resolved  - left breast still with ANASTACIO drain in place  - Right breast Sono - No drainable collection. Diffuse subcutaneous edema with suggestion of subcutaneous emphysema.   - blood cultures no growth at 48hrs  - IV antibiotics Vanco and Zosyn given in ER, will continue vanco and zosyn as per plastic sx   - IVF NS  - Plastics following  Dr. Cooper  - pain management, warm compress to right breast  - ID consult Saw pt recommended 9 days of Cefdinir 300mg pobid     #Hx/of Bipolar/Depression  - Continue home meds effexor, depakote and lamictal    #DVTppx:  - encourage ambulation   - SCDs  - does not meet criteria for dvt ppx injectable     1/1 Patient updated at bedside of plan, states she will update family  Patient is a 53y old h/o depression, bipolar disorder, YOLIS on CPAP, left breast cancer (was on Tamoxifen till 2021) s/p bilateral mastectomy and reconstruction in 2013 with Dr. Boggs at [facility]. Patient presents with a chief complaint of right breast pain.    #Right breast cellulitis  #S/p Bilat breast reconstruct 12/23  #H/O bilat breast CA   #Leukocytosis-resolved  - left breast still with ANASTACIO drain in place  - Right breast Sono - No drainable collection. Diffuse subcutaneous edema with suggestion of subcutaneous emphysema.   - blood cultures no growth at 48hrs  - IV antibiotics Vanco and Zosyn given in ER, will continue vanco and zosyn as per plastic sx   - IVF NS  - Plastics following  Dr. Cooper  - pain management, warm compress to right breast  - ID consult pending    #Hx/of Bipolar/Depression  - Continue home meds effexor, depakote and lamictal    #DVTppx:  - encourage ambulation   - SCDs  - does not meet criteria for dvt ppx injectable     1/1 Patient updated at bedside of plan, states she will update family  Patient is a 53y old h/o depression, bipolar disorder, YOLIS on CPAP, left breast cancer (was on Tamoxifen till 2021) s/p bilateral mastectomy and reconstruction in 2013 with Dr. Boggs at [facility]. Patient presents with a chief complaint of right breast pain.    #Right breast cellulitis  #S/p Bilat breast reconstruct 12/23  #H/O bilat breast CA   #Leukocytosis-resolved  - left breast still with ANASTACIO drain in place  - Right breast Sono - No drainable collection. Diffuse subcutaneous edema with suggestion of subcutaneous emphysema.   - blood cultures no growth at 48hrs  - IV antibiotics Vanco and Zosyn given in ER, will continue vanco and zosyn as per plastic sx   - IVF NS  - Plastics following  Dr. Cooper  - pain management, warm compress to right breast  - ID consult pending    #Hx/of Bipolar/Depression  - Continue home meds effexor, depakote and lamictal    #DVTppx:  - encourage ambulation   - SCDs  - does not meet criteria for dvt ppx injectable     1/1 Patient updated at bedside of plan, states she will update family

## 2024-01-02 ENCOUNTER — APPOINTMENT (OUTPATIENT)
Dept: PLASTIC SURGERY | Facility: CLINIC | Age: 54
End: 2024-01-02

## 2024-01-02 ENCOUNTER — TRANSCRIPTION ENCOUNTER (OUTPATIENT)
Age: 54
End: 2024-01-02

## 2024-01-02 VITALS
TEMPERATURE: 98 F | SYSTOLIC BLOOD PRESSURE: 103 MMHG | DIASTOLIC BLOOD PRESSURE: 73 MMHG | RESPIRATION RATE: 16 BRPM | OXYGEN SATURATION: 97 % | HEART RATE: 72 BPM

## 2024-01-02 DIAGNOSIS — Z42.1 ENCOUNTER FOR BREAST RECONSTRUCTION FOLLOWING MASTECTOMY: ICD-10-CM

## 2024-01-02 LAB
ANION GAP SERPL CALC-SCNC: 8 MMOL/L — SIGNIFICANT CHANGE UP (ref 5–17)
ANION GAP SERPL CALC-SCNC: 8 MMOL/L — SIGNIFICANT CHANGE UP (ref 5–17)
BASOPHILS # BLD AUTO: 0.1 K/UL — SIGNIFICANT CHANGE UP (ref 0–0.2)
BASOPHILS # BLD AUTO: 0.1 K/UL — SIGNIFICANT CHANGE UP (ref 0–0.2)
BASOPHILS NFR BLD AUTO: 1.1 % — SIGNIFICANT CHANGE UP (ref 0–2)
BASOPHILS NFR BLD AUTO: 1.1 % — SIGNIFICANT CHANGE UP (ref 0–2)
BUN SERPL-MCNC: 11 MG/DL — SIGNIFICANT CHANGE UP (ref 7–23)
BUN SERPL-MCNC: 11 MG/DL — SIGNIFICANT CHANGE UP (ref 7–23)
CALCIUM SERPL-MCNC: 9 MG/DL — SIGNIFICANT CHANGE UP (ref 8.4–10.5)
CALCIUM SERPL-MCNC: 9 MG/DL — SIGNIFICANT CHANGE UP (ref 8.4–10.5)
CHLORIDE SERPL-SCNC: 105 MMOL/L — SIGNIFICANT CHANGE UP (ref 96–108)
CHLORIDE SERPL-SCNC: 105 MMOL/L — SIGNIFICANT CHANGE UP (ref 96–108)
CO2 SERPL-SCNC: 29 MMOL/L — SIGNIFICANT CHANGE UP (ref 22–31)
CO2 SERPL-SCNC: 29 MMOL/L — SIGNIFICANT CHANGE UP (ref 22–31)
CREAT SERPL-MCNC: 0.73 MG/DL — SIGNIFICANT CHANGE UP (ref 0.5–1.3)
CREAT SERPL-MCNC: 0.73 MG/DL — SIGNIFICANT CHANGE UP (ref 0.5–1.3)
EGFR: 98 ML/MIN/1.73M2 — SIGNIFICANT CHANGE UP
EGFR: 98 ML/MIN/1.73M2 — SIGNIFICANT CHANGE UP
EOSINOPHIL # BLD AUTO: 0.41 K/UL — SIGNIFICANT CHANGE UP (ref 0–0.5)
EOSINOPHIL # BLD AUTO: 0.41 K/UL — SIGNIFICANT CHANGE UP (ref 0–0.5)
EOSINOPHIL NFR BLD AUTO: 4.6 % — SIGNIFICANT CHANGE UP (ref 0–6)
EOSINOPHIL NFR BLD AUTO: 4.6 % — SIGNIFICANT CHANGE UP (ref 0–6)
GLUCOSE SERPL-MCNC: 108 MG/DL — HIGH (ref 70–99)
GLUCOSE SERPL-MCNC: 108 MG/DL — HIGH (ref 70–99)
HCT VFR BLD CALC: 33 % — LOW (ref 34.5–45)
HCT VFR BLD CALC: 33 % — LOW (ref 34.5–45)
HGB BLD-MCNC: 11.4 G/DL — LOW (ref 11.5–15.5)
HGB BLD-MCNC: 11.4 G/DL — LOW (ref 11.5–15.5)
IMM GRANULOCYTES NFR BLD AUTO: 0.3 % — SIGNIFICANT CHANGE UP (ref 0–0.9)
IMM GRANULOCYTES NFR BLD AUTO: 0.3 % — SIGNIFICANT CHANGE UP (ref 0–0.9)
LYMPHOCYTES # BLD AUTO: 3.81 K/UL — HIGH (ref 1–3.3)
LYMPHOCYTES # BLD AUTO: 3.81 K/UL — HIGH (ref 1–3.3)
LYMPHOCYTES # BLD AUTO: 42.6 % — SIGNIFICANT CHANGE UP (ref 13–44)
LYMPHOCYTES # BLD AUTO: 42.6 % — SIGNIFICANT CHANGE UP (ref 13–44)
MCHC RBC-ENTMCNC: 32.8 PG — SIGNIFICANT CHANGE UP (ref 27–34)
MCHC RBC-ENTMCNC: 32.8 PG — SIGNIFICANT CHANGE UP (ref 27–34)
MCHC RBC-ENTMCNC: 34.5 GM/DL — SIGNIFICANT CHANGE UP (ref 32–36)
MCHC RBC-ENTMCNC: 34.5 GM/DL — SIGNIFICANT CHANGE UP (ref 32–36)
MCV RBC AUTO: 94.8 FL — SIGNIFICANT CHANGE UP (ref 80–100)
MCV RBC AUTO: 94.8 FL — SIGNIFICANT CHANGE UP (ref 80–100)
MONOCYTES # BLD AUTO: 1.09 K/UL — HIGH (ref 0–0.9)
MONOCYTES # BLD AUTO: 1.09 K/UL — HIGH (ref 0–0.9)
MONOCYTES NFR BLD AUTO: 12.2 % — SIGNIFICANT CHANGE UP (ref 2–14)
MONOCYTES NFR BLD AUTO: 12.2 % — SIGNIFICANT CHANGE UP (ref 2–14)
NEUTROPHILS # BLD AUTO: 3.5 K/UL — SIGNIFICANT CHANGE UP (ref 1.8–7.4)
NEUTROPHILS # BLD AUTO: 3.5 K/UL — SIGNIFICANT CHANGE UP (ref 1.8–7.4)
NEUTROPHILS NFR BLD AUTO: 39.2 % — LOW (ref 43–77)
NEUTROPHILS NFR BLD AUTO: 39.2 % — LOW (ref 43–77)
NRBC # BLD: 0 /100 WBCS — SIGNIFICANT CHANGE UP (ref 0–0)
NRBC # BLD: 0 /100 WBCS — SIGNIFICANT CHANGE UP (ref 0–0)
PLATELET # BLD AUTO: 266 K/UL — SIGNIFICANT CHANGE UP (ref 150–400)
PLATELET # BLD AUTO: 266 K/UL — SIGNIFICANT CHANGE UP (ref 150–400)
POTASSIUM SERPL-MCNC: 4.7 MMOL/L — SIGNIFICANT CHANGE UP (ref 3.5–5.3)
POTASSIUM SERPL-MCNC: 4.7 MMOL/L — SIGNIFICANT CHANGE UP (ref 3.5–5.3)
POTASSIUM SERPL-SCNC: 4.7 MMOL/L — SIGNIFICANT CHANGE UP (ref 3.5–5.3)
POTASSIUM SERPL-SCNC: 4.7 MMOL/L — SIGNIFICANT CHANGE UP (ref 3.5–5.3)
RBC # BLD: 3.48 M/UL — LOW (ref 3.8–5.2)
RBC # BLD: 3.48 M/UL — LOW (ref 3.8–5.2)
RBC # FLD: 12.7 % — SIGNIFICANT CHANGE UP (ref 10.3–14.5)
RBC # FLD: 12.7 % — SIGNIFICANT CHANGE UP (ref 10.3–14.5)
SODIUM SERPL-SCNC: 142 MMOL/L — SIGNIFICANT CHANGE UP (ref 135–145)
SODIUM SERPL-SCNC: 142 MMOL/L — SIGNIFICANT CHANGE UP (ref 135–145)
WBC # BLD: 8.94 K/UL — SIGNIFICANT CHANGE UP (ref 3.8–10.5)
WBC # BLD: 8.94 K/UL — SIGNIFICANT CHANGE UP (ref 3.8–10.5)
WBC # FLD AUTO: 8.94 K/UL — SIGNIFICANT CHANGE UP (ref 3.8–10.5)
WBC # FLD AUTO: 8.94 K/UL — SIGNIFICANT CHANGE UP (ref 3.8–10.5)

## 2024-01-02 PROCEDURE — 87040 BLOOD CULTURE FOR BACTERIA: CPT

## 2024-01-02 PROCEDURE — 83735 ASSAY OF MAGNESIUM: CPT

## 2024-01-02 PROCEDURE — 85025 COMPLETE CBC W/AUTO DIFF WBC: CPT

## 2024-01-02 PROCEDURE — 96365 THER/PROPH/DIAG IV INF INIT: CPT

## 2024-01-02 PROCEDURE — 85027 COMPLETE CBC AUTOMATED: CPT

## 2024-01-02 PROCEDURE — 80202 ASSAY OF VANCOMYCIN: CPT

## 2024-01-02 PROCEDURE — 80048 BASIC METABOLIC PNL TOTAL CA: CPT

## 2024-01-02 PROCEDURE — 36415 COLL VENOUS BLD VENIPUNCTURE: CPT

## 2024-01-02 PROCEDURE — 96375 TX/PRO/DX INJ NEW DRUG ADDON: CPT

## 2024-01-02 PROCEDURE — 93005 ELECTROCARDIOGRAM TRACING: CPT

## 2024-01-02 PROCEDURE — 83605 ASSAY OF LACTIC ACID: CPT

## 2024-01-02 PROCEDURE — 96368 THER/DIAG CONCURRENT INF: CPT

## 2024-01-02 PROCEDURE — 85730 THROMBOPLASTIN TIME PARTIAL: CPT

## 2024-01-02 PROCEDURE — 80053 COMPREHEN METABOLIC PANEL: CPT

## 2024-01-02 PROCEDURE — 99285 EMERGENCY DEPT VISIT HI MDM: CPT

## 2024-01-02 PROCEDURE — 85610 PROTHROMBIN TIME: CPT

## 2024-01-02 RX ORDER — CEFDINIR 250 MG/5ML
1 POWDER, FOR SUSPENSION ORAL
Qty: 16 | Refills: 0
Start: 2024-01-02 | End: 2024-01-09

## 2024-01-02 RX ORDER — CEFDINIR 250 MG/5ML
1 POWDER, FOR SUSPENSION ORAL
Qty: 18 | Refills: 0
Start: 2024-01-02 | End: 2024-01-10

## 2024-01-02 RX ADMIN — Medication 75 MILLIGRAM(S): at 08:14

## 2024-01-02 RX ADMIN — PIPERACILLIN AND TAZOBACTAM 25 GRAM(S): 4; .5 INJECTION, POWDER, LYOPHILIZED, FOR SOLUTION INTRAVENOUS at 01:54

## 2024-01-02 RX ADMIN — Medication 250 MILLIGRAM(S): at 05:41

## 2024-01-02 RX ADMIN — POLYETHYLENE GLYCOL 3350 17 GRAM(S): 17 POWDER, FOR SOLUTION ORAL at 05:41

## 2024-01-02 RX ADMIN — PIPERACILLIN AND TAZOBACTAM 25 GRAM(S): 4; .5 INJECTION, POWDER, LYOPHILIZED, FOR SOLUTION INTRAVENOUS at 08:14

## 2024-01-02 NOTE — PROGRESS NOTE ADULT - NS ATTEND AMEND GEN_ALL_CORE FT
right breast cellulitis - much improved   -s/p recent revisional breast reconstruction  -cont IV vancomycin and zosyn,  -blood cx negative  -ID consult for oral abx choices and DC planning

## 2024-01-02 NOTE — PROGRESS NOTE ADULT - REASON FOR ADMISSION
pain and erythema right breast
pain and reddness right breast
pain and redness right breast
right breast cellulitis
pain and reddness right breast
Right breast cellulitis

## 2024-01-02 NOTE — DISCHARGE NOTE NURSING/CASE MANAGEMENT/SOCIAL WORK - NSDCPEFALRISK_GEN_ALL_CORE
For information on Fall & Injury Prevention, visit: [email or web address] OR  [email or web address] OR  https://www.cdc.gov/steadi/patient.html For information on Fall & Injury Prevention, visit: [email or web address] OR  [email or web address] OR  https://www.cdc.gov/steadi/patient.html

## 2024-01-02 NOTE — PROGRESS NOTE ADULT - ASSESSMENT
Patient with right breast cellulitis after revision surgery.    -Patient awaiting ID input, anticipate discharge with antibiotics, oral vs PICC  -patient understands that she continues to be at risk for infection which would necessitate explantation  -Patient to consider Autologous tissue reconstruction with HÉCTOR flap, thorough discussion with Dr. Levy, pending MRA Abdomen/pelvis as outpatient  -continue left breast drain  -OOB, DVT ppx, pain control  -serial examinations

## 2024-01-02 NOTE — PROGRESS NOTE ADULT - SUBJECTIVE AND OBJECTIVE BOX
Patient is 3 wks s/p revision of bilateral breast reconstruction, post-operative course complicated by right breast cellulitis. Admitted for IV abx. Leukocytosis resolved. Erythema and swelling improving. Pain improving. Patient likely to be discharged on continued antibiotics, considering salvage with autologous breast reconstruction in near future.       Vital Signs Last 24 Hrs  T(C): 37.3 (02 Jan 2024 05:00), Max: 37.3 (02 Jan 2024 05:00)  T(F): 99.2 (02 Jan 2024 05:00), Max: 99.2 (02 Jan 2024 05:00)  HR: 66 (02 Jan 2024 05:00) (60 - 71)  BP: 113/53 (02 Jan 2024 05:00) (113/53 - 120/99)  BP(mean): 0 (02 Jan 2024 05:00) (0 - 0)  RR: 18 (02 Jan 2024 05:00) (14 - 18)  SpO2: 98% (02 Jan 2024 05:00) (98% - 100%)    Parameters below as of 02 Jan 2024 05:00  Patient On (Oxygen Delivery Method): room air                          11.2   9.20  )-----------( 230      ( 01 Jan 2024 06:00 )             33.3     Exam:  Left breast well healed, no sign of infection, drain serous.   Right breast with improving swelling and cellulitis, mildly tender to palpation along surgical scar

## 2024-01-02 NOTE — CONSULT NOTE ADULT - SUBJECTIVE AND OBJECTIVE BOX
HPI:   Patient is a 53y female with history of breast cancer s/p bilateral mastectomies and implants about 10 years ago. For the past year the right breast has been hurting and deformed. On 12/12 she underwent planned implant exchange, repair of pectoral muscle , capsulectomy and pocket formation . She had a ruptured right breast implant,. On 12/28 she had the right breast drain pulled and within a couple of hours she got fever and the breast got red and painful. She came back on 12/29 and was placed on vanco and zosyn. Leukocytosis and fever have resolved, the redness has receded a great deal and she feels well. No n,v,d,sob,cp, ha, further fever    REVIEW OF SYSTEMS:  All other review of systems negative (Comprehensive ROS)    PAST MEDICAL & SURGICAL HISTORY:  Breast cancer  Left s/p Bilateral Mastectomy  ; tx Tamoxifen x 8 years      Migraine headache      Depression  Bipolar      Anxiety  1/14/22 ; IN PST ; pt denies h/o anxiety      Uterine polyp      Endometrial thickening on ultrasound      2019 novel coronavirus disease (COVID-19)  Feb 2021 , did not need to be hospitalized.      YOLIS on CPAP      Breast cancer, left      H/O bilateral mastectomy  2012 saline implants      History of D&C          Allergies    bacitracin (Rash)  latex (Unknown)    Intolerances        Antimicrobials Day #  :5  piperacillin/tazobactam IVPB.. 3.375 Gram(s) IV Intermittent every 8 hours  vancomycin  IVPB 1000 milliGRAM(s) IV Intermittent every 12 hours    Other Medications:  acetaminophen     Tablet .. 650 milliGRAM(s) Oral every 6 hours PRN  divalproex DR 1250 milliGRAM(s) Oral at bedtime  lamoTRIgine 200 milliGRAM(s) Oral at bedtime  ondansetron Injectable 4 milliGRAM(s) IV Push every 6 hours PRN  polyethylene glycol 3350 17 Gram(s) Oral two times a day  senna 2 Tablet(s) Oral at bedtime  sodium chloride 0.9%. 1000 milliLiter(s) IV Continuous <Continuous>  venlafaxine 75 milliGRAM(s) Oral with breakfast      FAMILY HISTORY:  FH: leukemia (Father)    Family history of hypertension in mother (Mother)        SOCIAL HISTORY:  Smoking: [ ]Yes [xc ]No  ETOH: [ ]Yes x[ ]No  Drug Use: [ ]Yes [x ]No   [ x] Single[ ]    T(F): 97.9 (01-02-24 @ 12:40), Max: 99.2 (01-02-24 @ 05:00)  HR: 72 (01-02-24 @ 12:40)  BP: 103/73 (01-02-24 @ 12:40)  RR: 16 (01-02-24 @ 12:40)  SpO2: 97% (01-02-24 @ 12:40)  Wt(kg): --    PHYSICAL EXAM:  General: alert, no acute distress  Eyes:  anicteric, no conjunctival injection, no discharge  Oropharynx: no lesions or injection 	  Neck: supple, without adenopathy  Lungs: clear to auscultation  Heart: regular rate and rhythm; no murmur, rubs or gallops  Abdomen: soft, nondistended, nontender, without mass or organomegaly  Skin: no lesions  Extremities: no clubbing, cyanosis, or edema  Neurologic: alert, oriented, moves all extremities  right breast with a bit of red and induration just under the scar, no crepitus, no fluctuance, no drainage. left breast no redness, drain present.   LAB RESULTS:                        11.4   8.94  )-----------( 266      ( 02 Jan 2024 07:32 )             33.0     01-02    142  |  105  |  11  ----------------------------<  108<H>  4.7   |  29  |  0.73    Ca    9.0      02 Jan 2024 07:32        Urinalysis Basic - ( 02 Jan 2024 07:32 )    Color: x / Appearance: x / SG: x / pH: x  Gluc: 108 mg/dL / Ketone: x  / Bili: x / Urobili: x   Blood: x / Protein: x / Nitrite: x   Leuk Esterase: x / RBC: x / WBC x   Sq Epi: x / Non Sq Epi: x / Bacteria: x        MICROBIOLOGY:  RECENT CULTURES:  12-29 @ 17:00 .Blood Blood-Peripheral     No growth at 72 Hours      12-29 @ 16:45 .Blood Blood-Peripheral     No growth at 72 Hours            RADIOLOGY REVIEWED:    < from: US Breast Complete, Right (12.29.23 @ 14:27) >    EXAM: [id number] - US BREAST COMPLETE RT  - ORDERED BY: BEHR LAUREN B SHIKOWITZ      PROCEDURE DATE:  12/29/2023           INTERPRETATION:  RIGHT BREAST ULTRASOUND COMPLETE    CLINICAL INDICATION: Ultrasound was performed for the clinical indication   of evaluation of possible seroma/abscess. Patient with recent right   breast implant exchange on December 12, 2023 with drainage removal on   12/28/2023, now with pain, tenderness, redness of the skin and fever.    COMPARISON STUDIES: Dated  6/8/2023    Sonographic evaluation of the right breast was performed in its entirety,   including each of the four quadrants and the retroareolar region, in   clockwise fashion. I personally scanned this patient after initial   evaluation by the technologist.    FINDINGS:  The reconstructed breast demonstrates a mostly fatty echotexture.    In the lower outer quadrant, there is a small heterogeneous collection,   without evidence of drainable collection . There is diffuse subcutaneous   edema with evidence of reverberation artifact, suggestive of subcutaneous   emphysema.    Breast implant is noted.    IMPRESSION:  No drainable collection. Diffuse subcutaneous edema with suggestion of   subcutaneous emphysema. These findings were discussed with  on   12/29/2023 by Dr. Reilly.    RECOMMENDATION:  Clinical follow up.    BI-RADS 2 - Benign Finding(s)    These results and recommendations were explained to the patient, who will   also be mailed a written summary in layman's terms.    --- End of Report ---    < end of copied text >        Impression:  Patient s/p bilateral mastectomies and breast implants for cancer about 10 years ago. Over the past year the right breast got painful and mishapen. She underwent bilateral exchange of the implants, right was ruptured and right capsulectomy and right pectoral muscle repair on 12/12. On 12/28 the right drain was pulled and within a few hours the breast got red, she got fever, came in on 12/29. She has no collections. She has improved on vanco and zosyn. The tempo of the illness is very characteristic of beta strept given rapidity of onset and quick improvement. The implant may or may not be infected. I explained to patient that no duration of abx will cure the implant if it is infected. Hopefully though, given rapid onset and rapid improvement, the implant is not infected and can be salvaged.   Recommendations:  change to po cefdinir 300mg po bid for 9 more days  follow up in my office in a week  if breast redness worsens then would remove implant. R/W pt and Dr. Lopez HPI:   Patient is a 53y female with history of breast cancer s/p bilateral mastectomies and implants about 10 years ago. For the past year the right breast has been hurting and deformed. On 12/12 she underwent planned implant exchange, repair of pectoral muscle , capsulectomy and pocket formation . She had a ruptured right breast implant,. On 12/28 she had the right breast drain pulled and within a couple of hours she got fever and the breast got red and painful. She came back on 12/29 and was placed on vanco and zosyn. Leukocytosis and fever have resolved, the redness has receded a great deal and she feels well. No n,v,d,sob,cp, ha, further fever    REVIEW OF SYSTEMS:  All other review of systems negative (Comprehensive ROS)    PAST MEDICAL & SURGICAL HISTORY:  Breast cancer  Left s/p Bilateral Mastectomy  ; tx Tamoxifen x 8 years      Migraine headache      Depression  Bipolar      Anxiety  1/14/22 ; IN PST ; pt denies h/o anxiety      Uterine polyp      Endometrial thickening on ultrasound      2019 novel coronavirus disease (COVID-19)  Feb 2021 , did not need to be hospitalized.      YOLIS on CPAP      Breast cancer, left      H/O bilateral mastectomy  2012 saline implants      History of D&C          Allergies    bacitracin (Rash)  latex (Unknown)    Intolerances        Antimicrobials Day #  :5  piperacillin/tazobactam IVPB.. 3.375 Gram(s) IV Intermittent every 8 hours  vancomycin  IVPB 1000 milliGRAM(s) IV Intermittent every 12 hours    Other Medications:  acetaminophen     Tablet .. 650 milliGRAM(s) Oral every 6 hours PRN  divalproex DR 1250 milliGRAM(s) Oral at bedtime  lamoTRIgine 200 milliGRAM(s) Oral at bedtime  ondansetron Injectable 4 milliGRAM(s) IV Push every 6 hours PRN  polyethylene glycol 3350 17 Gram(s) Oral two times a day  senna 2 Tablet(s) Oral at bedtime  sodium chloride 0.9%. 1000 milliLiter(s) IV Continuous <Continuous>  venlafaxine 75 milliGRAM(s) Oral with breakfast      FAMILY HISTORY:  FH: leukemia (Father)    Family history of hypertension in mother (Mother)        SOCIAL HISTORY:  Smoking: [ ]Yes [xc ]No  ETOH: [ ]Yes x[ ]No  Drug Use: [ ]Yes [x ]No   [ x] Single[ ]    T(F): 97.9 (01-02-24 @ 12:40), Max: 99.2 (01-02-24 @ 05:00)  HR: 72 (01-02-24 @ 12:40)  BP: 103/73 (01-02-24 @ 12:40)  RR: 16 (01-02-24 @ 12:40)  SpO2: 97% (01-02-24 @ 12:40)  Wt(kg): --    PHYSICAL EXAM:  General: alert, no acute distress  Eyes:  anicteric, no conjunctival injection, no discharge  Oropharynx: no lesions or injection 	  Neck: supple, without adenopathy  Lungs: clear to auscultation  Heart: regular rate and rhythm; no murmur, rubs or gallops  Abdomen: soft, nondistended, nontender, without mass or organomegaly  Skin: no lesions  Extremities: no clubbing, cyanosis, or edema  Neurologic: alert, oriented, moves all extremities  right breast with a bit of red and induration just under the scar, no crepitus, no fluctuance, no drainage. left breast no redness, drain present.   LAB RESULTS:                        11.4   8.94  )-----------( 266      ( 02 Jan 2024 07:32 )             33.0     01-02    142  |  105  |  11  ----------------------------<  108<H>  4.7   |  29  |  0.73    Ca    9.0      02 Jan 2024 07:32        Urinalysis Basic - ( 02 Jan 2024 07:32 )    Color: x / Appearance: x / SG: x / pH: x  Gluc: 108 mg/dL / Ketone: x  / Bili: x / Urobili: x   Blood: x / Protein: x / Nitrite: x   Leuk Esterase: x / RBC: x / WBC x   Sq Epi: x / Non Sq Epi: x / Bacteria: x        MICROBIOLOGY:  RECENT CULTURES:  12-29 @ 17:00 .Blood Blood-Peripheral     No growth at 72 Hours      12-29 @ 16:45 .Blood Blood-Peripheral     No growth at 72 Hours            RADIOLOGY REVIEWED:    < from: US Breast Complete, Right (12.29.23 @ 14:27) >    EXAM: [id number] - US BREAST COMPLETE RT  - ORDERED BY: BEHR LAUREN B SHIKOWITZ      PROCEDURE DATE:  12/29/2023           INTERPRETATION:  RIGHT BREAST ULTRASOUND COMPLETE    CLINICAL INDICATION: Ultrasound was performed for the clinical indication   of evaluation of possible seroma/abscess. Patient with recent right   breast implant exchange on December 12, 2023 with drainage removal on   12/28/2023, now with pain, tenderness, redness of the skin and fever.    COMPARISON STUDIES: Dated  6/8/2023    Sonographic evaluation of the right breast was performed in its entirety,   including each of the four quadrants and the retroareolar region, in   clockwise fashion. I personally scanned this patient after initial   evaluation by the technologist.    FINDINGS:  The reconstructed breast demonstrates a mostly fatty echotexture.    In the lower outer quadrant, there is a small heterogeneous collection,   without evidence of drainable collection . There is diffuse subcutaneous   edema with evidence of reverberation artifact, suggestive of subcutaneous   emphysema.    Breast implant is noted.    IMPRESSION:  No drainable collection. Diffuse subcutaneous edema with suggestion of   subcutaneous emphysema. These findings were discussed with  on   12/29/2023 by Dr. Reilly.    RECOMMENDATION:  Clinical follow up.    BI-RADS 2 - Benign Finding(s)    These results and recommendations were explained to the patient, who will   also be mailed a written summary in layman's terms.    --- End of Report ---    < end of copied text >        Impression:  Patient s/p bilateral mastectomies and breast implants for cancer about 10 years ago. Over the past year the right breast got painful and mishapen. She underwent bilateral exchange of the implants, right was ruptured and right capsulectomy and right pectoral muscle repair on 12/12. On 12/28 the right drain was pulled and within a few hours the breast got red, she got fever, came in on 12/29. She has no collections. She has improved on vanco and zosyn. The tempo of the illness is very characteristic of beta strept given rapidity of onset and quick improvement. The implant may or may not be infected. I explained to patient that no duration of abx will cure the implant if it is infected. Hopefully though, given rapid onset and rapid improvement, the implant is not infected and can be salvaged.   Recommendations:  change to po cefdinir 300mg po bid for 9 more days  follow up in my office in a week  if breast redness worsens then would remove implant. R/W pt and Dr. Lopez

## 2024-01-02 NOTE — DISCHARGE NOTE NURSING/CASE MANAGEMENT/SOCIAL WORK - PATIENT PORTAL LINK FT
You can access the FollowMyHealth Patient Portal offered by [facility] by registering at the following website: [email or web address]. By joining [email or web address]’s FollowMyHealth portal, you will also be able to view your health information using other applications (apps) compatible with our system. You can access the FollowMyHealth Patient Portal offered by [facility] by registering at the following website: [email or web address]. By joining [email or web address]’s FollowMyHealth portal, you will also be able to view your health information using other applications (apps) compatible with our system.

## 2024-01-02 NOTE — PROGRESS NOTE ADULT - PROVIDER SPECIALTY LIST ADULT
Critical Care
Critical Care
Hospitalist
Plastic Surgery
Surgery
Hospitalist
Plastic Surgery

## 2024-01-02 NOTE — PROGRESS NOTE ADULT - ASSESSMENT
Patient is a 53y old h/o depression, bipolar disorder, YOLIS on CPAP, left breast cancer (was on Tamoxifen till 2021) s/p bilateral mastectomy and reconstruction in 2013 with Dr. Boggs at [facility]. Patient presents with a chief complaint of right breast pain.    #Right breast cellulitis  #S/p Bilat breast reconstruct 12/23  #H/O bilat breast CA   #Leukocytosis-resolved  - left breast still with ANASTACIO drain in place  - Right breast Sono - No drainable collection. Diffuse subcutaneous edema with suggestion of subcutaneous emphysema.   - blood cultures no growth at 48hrs  - IV antibiotics Vanco and Zosyn given in ER, will continue vanco and zosyn as per plastic sx   - IVF NS  - Plastics following  Dr. Cooper  - pain management, warm compress to right breast  - ID consult pending    #Hx/of Bipolar/Depression  - Continue home meds effexor, depakote and lamictal    #DVTppx:  - encourage ambulation   - SCDs  - does not meet criteria for dvt ppx injectable     1/1 Patient updated at bedside of plan, states she will update family  Patient is a 53y old h/o depression, bipolar disorder, YOLIS on CPAP, left breast cancer (was on Tamoxifen till 2021) s/p bilateral mastectomy and reconstruction in 2013 with Dr. Boggs at [facility]. Patient presents with a chief complaint of right breast pain.    #Right breast cellulitis  #S/p Bilat breast reconstruct 12/23  #H/O bilat breast CA   #Leukocytosis-resolved  - left breast still with ANASTACIO drain in place  - Right breast Sono - No drainable collection. Diffuse subcutaneous edema with suggestion of subcutaneous emphysema.   - blood cultures no growth at 48hrs  - IV antibiotics Vanco and Zosyn given in ER, will continue vanco and zosyn as per plastic sx   - IVF NS  - Plastics following  Dr. Cooper  - pain management, warm compress to right breast  - ID consult pending    #Hx/of Bipolar/Depression  - Continue home meds effexor, depakote and lamictal    #DVTppx:  - encourage ambulation   - SCDs  - does not meet criteria for dvt ppx injectable     1/1 Patient updated at bedside of plan, states she will update family  Patient is a 53y old h/o depression, bipolar disorder, YOLIS on CPAP, left breast cancer (was on Tamoxifen till 2021) s/p bilateral mastectomy and reconstruction in 2013 with Dr. Boggs at [facility]. Patient presents with a chief complaint of right breast pain.    #Right breast cellulitis  #S/p Bilat breast reconstruct 12/23  #H/O bilat breast CA   #Leukocytosis-resolved  - left breast still with ANASTACIO drain in place  - Right breast Sono - No drainable collection. Diffuse subcutaneous edema with suggestion of subcutaneous emphysema.   - blood cultures no growth at 48hrs  - IV antibiotics Vanco and Zosyn given in ER, will continue vanco and zosyn as per plastic sx   - dc ivf   - Plastics following  Dr. Cooper  - pain management, warm compress to right breast  - ID consult pending    #Hx/of Bipolar/Depression  - Continue home meds effexor, depakote and lamictal    #DVTppx:  - encourage ambulation   - SCDs  - does not meet criteria for dvt ppx injectable     1/1 Patient updated at bedside of plan, states she will update family  Patient is a 53y old h/o depression, bipolar disorder, YOLIS on CPAP, left breast cancer (was on Tamoxifen till 2021) s/p bilateral mastectomy and reconstruction in 2013 with Dr. Boggs at [facility]. Patient presents with a chief complaint of right breast pain.    #Right breast cellulitis  #S/p Bilat breast reconstruct 12/23  #H/O bilat breast CA   #Leukocytosis-resolved  - left breast still with ANASTACIO drain in place  - Right breast Sono - No drainable collection. Diffuse subcutaneous edema with suggestion of subcutaneous emphysema.   - blood cultures no growth at 48hrs  - IV antibiotics Vanco and Zosyn given in ER, will continue vanco and zosyn as per plastic sx   - dc ivf   - Plastics following  Dr. Cooper  - pain management, warm compress to right breast  - ID consult pending    #Hx/of Bipolar/Depression  - Continue home meds effexor, depakote and lamictal    #DVTppx:  - encourage ambulation   - SCDs  - does not meet criteria for dvt ppx injectable     1/1 Patient updated at bedside of plan, states she will update family  Patient is a 53y old h/o depression, bipolar disorder, YOLIS on CPAP, left breast cancer (was on Tamoxifen till 2021) s/p bilateral mastectomy and reconstruction in 2013 with Dr. Boggs at [facility]. Patient presents with a chief complaint of right breast pain.    #Right breast cellulitis  #S/p Bilat breast reconstruct 12/23  #H/O bilat breast CA   #Leukocytosis-resolved  - left breast still with ANASTACIO drain in place  - Right breast Sono - No drainable collection. Diffuse subcutaneous edema with suggestion of subcutaneous emphysema.   - blood cultures no growth at 48hrs  - IV antibiotics Vanco and Zosyn given in ER, will continue vanco and zosyn as per plastic sx   - dc ivf   - Plastics following  Dr. Cooper  - pain management, warm compress to right breast  - ID consult pending- spoke with Dr Aleman and recommend Cefdinir 300mg po bid X8 more days.  If reinfection occurs will need to come back to Hospital and possible have implant removed     #Hx/of Bipolar/Depression  - Continue home meds effexor, depakote and lamictal    #DVTppx:  - encourage ambulation   - SCDs  - does not meet criteria for dvt ppx injectable     1/1 Patient updated at bedside of plan, states she will update family  Patient is a 53y old h/o depression, bipolar disorder, YOLIS on CPAP, left breast cancer (was on Tamoxifen till 2021) s/p bilateral mastectomy and reconstruction in 2013 with Dr. Boggs at [facility]. Patient presents with a chief complaint of right breast pain.    #Right breast cellulitis  #S/p Bilat breast reconstruct 12/23  #H/O bilat breast CA   #Leukocytosis-resolved  - left breast still with ANASTACIO drain in place  - Right breast Sono - No drainable collection. Diffuse subcutaneous edema with suggestion of subcutaneous emphysema.   - blood cultures no growth at 48hrs  - IV antibiotics Vanco and Zosyn given in ER, will continue vanco and zosyn as per plastic sx   - dc ivf   - Plastics following  Dr. Cooper  - pain management, warm compress to right breast  - ID consult pending- spoke with Dr Aleman and recommend Cefdinir 300mg po bid X8 more days.  If reinfection occurs will need to come back to Hospital and possible have implant removed     #Hx/of Bipolar/Depression  - Continue home meds effexor, depakote and lamictal    #DVTppx:  - encourage ambulation   - SCDs  - does not meet criteria for dvt ppx injectable     1/1 Patient updated at bedside of plan, states she will update family  Patient is a 53y old h/o depression, bipolar disorder, YOLIS on CPAP, left breast cancer (was on Tamoxifen till 2021) s/p bilateral mastectomy and reconstruction in 2013 with Dr. Boggs at [facility]. Patient presents with a chief complaint of right breast pain.    #Right breast cellulitis  #S/p Bilat breast reconstruct 12/23  #H/O bilat breast CA   #Leukocytosis-resolved  - left breast still with ANASTACIO drain in place  - Right breast Sono - No drainable collection. Diffuse subcutaneous edema with suggestion of subcutaneous emphysema.   - blood cultures no growth at 48hrs  - IV antibiotics Vanco and Zosyn given in ER, will continue vanco and zosyn as per plastic sx   - dc ivf   - Plastics following  Dr. Cooper  - pain management, warm compress to right breast  - ID consult pending- spoke with Dr Aelman and recommend Cefdinir 300mg po bid X 9 more days.  If reinfection occurs will need to come back to Hospital and possible have implant removed     #Hx/of Bipolar/Depression  - Continue home meds effexor, depakote and lamictal    #DVTppx:  - encourage ambulation   - SCDs  - does not meet criteria for dvt ppx injectable     1/1 Patient updated at bedside of plan, states she will update family  Patient is a 53y old h/o depression, bipolar disorder, YOLIS on CPAP, left breast cancer (was on Tamoxifen till 2021) s/p bilateral mastectomy and reconstruction in 2013 with Dr. Boggs at [facility]. Patient presents with a chief complaint of right breast pain.    #Right breast cellulitis  #S/p Bilat breast reconstruct 12/23  #H/O bilat breast CA   #Leukocytosis-resolved  - left breast still with ANASTACIO drain in place  - Right breast Sono - No drainable collection. Diffuse subcutaneous edema with suggestion of subcutaneous emphysema.   - blood cultures no growth at 48hrs  - IV antibiotics Vanco and Zosyn given in ER, will continue vanco and zosyn as per plastic sx   - dc ivf   - Plastics following  Dr. Cooper  - pain management, warm compress to right breast  - ID consult pending- spoke with Dr Aleman and recommend Cefdinir 300mg po bid X 9 more days.  If reinfection occurs will need to come back to Hospital and possible have implant removed     #Hx/of Bipolar/Depression  - Continue home meds effexor, depakote and lamictal    #DVTppx:  - encourage ambulation   - SCDs  - does not meet criteria for dvt ppx injectable     1/1 Patient updated at bedside of plan, states she will update family

## 2024-01-02 NOTE — PROGRESS NOTE ADULT - SUBJECTIVE AND OBJECTIVE BOX
Patient is a 53y old  Female who presents with a chief complaint of pain and reddness right breast (01 Jan 2024 19:32)      Patient seen and examined at bedside.    ALLERGIES:  bacitracin (Rash)  latex (Unknown)    MEDICATIONS  (STANDING):  divalproex DR 1250 milliGRAM(s) Oral at bedtime  lamoTRIgine 200 milliGRAM(s) Oral at bedtime  piperacillin/tazobactam IVPB.. 3.375 Gram(s) IV Intermittent every 8 hours  polyethylene glycol 3350 17 Gram(s) Oral two times a day  senna 2 Tablet(s) Oral at bedtime  sodium chloride 0.9%. 1000 milliLiter(s) (75 mL/Hr) IV Continuous <Continuous>  vancomycin  IVPB 1000 milliGRAM(s) IV Intermittent every 12 hours  venlafaxine 75 milliGRAM(s) Oral with breakfast    MEDICATIONS  (PRN):  acetaminophen     Tablet .. 650 milliGRAM(s) Oral every 6 hours PRN Temp greater or equal to 38C (100.4F), Mild Pain (1 - 3), Moderate Pain (4 - 6)  ondansetron Injectable 4 milliGRAM(s) IV Push every 6 hours PRN Nausea and/or Vomiting    Vital Signs Last 24 Hrs  T(F): 99.2 (02 Jan 2024 05:00), Max: 99.2 (02 Jan 2024 05:00)  HR: 66 (02 Jan 2024 05:00) (60 - 71)  BP: 113/53 (02 Jan 2024 05:00) (113/53 - 120/99)  RR: 18 (02 Jan 2024 05:00) (14 - 18)  SpO2: 98% (02 Jan 2024 05:00) (98% - 100%)  I&O's Summary    01 Jan 2024 07:01  -  02 Jan 2024 07:00  --------------------------------------------------------  IN: 1450 mL / OUT: 49 mL / NET: 1401 mL      PHYSICAL EXAM:  General: NAD, A/O x 3  ENT: MMM  Neck: Supple, No JVD  Lungs: Clear to auscultation bilaterally, Non labored breathing   Cardio: RRR, S1/S2, No murmurs  Abdomen: Soft, Nontender, Nondistended; Bowel sounds present  Extremities: No calf tenderness, No pitting edema    LABS:                        11.2   9.20  )-----------( 230      ( 01 Jan 2024 06:00 )             33.3     01-01    142  |  106  |  8   ----------------------------<  115  4.9   |  29  |  0.77    Ca    8.9      01 Jan 2024 06:00  Mg     2.3     12-31    TPro  5.7  /  Alb  2.3  /  TBili  0.2  /  DBili  x   /  AST  41  /  ALT  42  /  AlkPhos  51  12-31                                Urinalysis Basic - ( 01 Jan 2024 06:00 )    Color: x / Appearance: x / SG: x / pH: x  Gluc: 115 mg/dL / Ketone: x  / Bili: x / Urobili: x   Blood: x / Protein: x / Nitrite: x   Leuk Esterase: x / RBC: x / WBC x   Sq Epi: x / Non Sq Epi: x / Bacteria: x        Culture - Blood (collected 29 Dec 2023 17:00)  Source: .Blood Blood-Peripheral  Preliminary Report (01 Jan 2024 22:02):    No growth at 72 Hours    Culture - Blood (collected 29 Dec 2023 16:45)  Source: .Blood Blood-Peripheral  Preliminary Report (01 Jan 2024 22:01):    No growth at 72 Hours        RADIOLOGY & ADDITIONAL TESTS:    Care Discussed with Consultants/Other Providers:    Patient is a 53y old  Female who presents with a chief complaint of pain and reddness right breast      Patient seen and examined at bedside.  Pt without complaints this am, ready to go home     ALLERGIES:  bacitracin (Rash)  latex (Unknown)    MEDICATIONS  (STANDING):  divalproex DR 1250 milliGRAM(s) Oral at bedtime  lamoTRIgine 200 milliGRAM(s) Oral at bedtime  piperacillin/tazobactam IVPB.. 3.375 Gram(s) IV Intermittent every 8 hours  polyethylene glycol 3350 17 Gram(s) Oral two times a day  senna 2 Tablet(s) Oral at bedtime  sodium chloride 0.9%. 1000 milliLiter(s) (75 mL/Hr) IV Continuous <Continuous>  vancomycin  IVPB 1000 milliGRAM(s) IV Intermittent every 12 hours  venlafaxine 75 milliGRAM(s) Oral with breakfast    MEDICATIONS  (PRN):  acetaminophen     Tablet .. 650 milliGRAM(s) Oral every 6 hours PRN Temp greater or equal to 38C (100.4F), Mild Pain (1 - 3), Moderate Pain (4 - 6)  ondansetron Injectable 4 milliGRAM(s) IV Push every 6 hours PRN Nausea and/or Vomiting    Vital Signs Last 24 Hrs  T(F): 99.2 (02 Jan 2024 05:00), Max: 99.2 (02 Jan 2024 05:00)  HR: 66 (02 Jan 2024 05:00) (60 - 71)  BP: 113/53 (02 Jan 2024 05:00) (113/53 - 120/99)  RR: 18 (02 Jan 2024 05:00) (14 - 18)  SpO2: 98% (02 Jan 2024 05:00) (98% - 100%)  I&O's Summary    01 Jan 2024 07:01  -  02 Jan 2024 07:00  --------------------------------------------------------  IN: 1450 mL / OUT: 49 mL / NET: 1401 mL      PHYSICAL EXAM:  General:54 y/o female in NAD, A/O x 3  ENT: MMM  Breast- right breast with decreased reddnes and induration, much improved.  Lft breast still with drain present   Neck: Supple, No JVD  Lungs: Clear to auscultation bilaterally, Non labored breathing   Cardio: RRR, S1/S2, No murmurs  Abdomen: Soft, Nontender, Nondistended; Bowel sounds present  Extremities: No calf tenderness, No pitting edema    LABS:                        11.2   9.20  )-----------( 230      ( 01 Jan 2024 06:00 )             33.3     01-01    142  |  106  |  8   ----------------------------<  115  4.9   |  29  |  0.77    Ca    8.9      01 Jan 2024 06:00  Mg     2.3     12-31    TPro  5.7  /  Alb  2.3  /  TBili  0.2  /  DBili  x   /  AST  41  /  ALT  42  /  AlkPhos  51  12-31                                Urinalysis Basic - ( 01 Jan 2024 06:00 )    Color: x / Appearance: x / SG: x / pH: x  Gluc: 115 mg/dL / Ketone: x  / Bili: x / Urobili: x   Blood: x / Protein: x / Nitrite: x   Leuk Esterase: x / RBC: x / WBC x   Sq Epi: x / Non Sq Epi: x / Bacteria: x        Culture - Blood (collected 29 Dec 2023 17:00)  Source: .Blood Blood-Peripheral  Preliminary Report (01 Jan 2024 22:02):    No growth at 72 Hours    Culture - Blood (collected 29 Dec 2023 16:45)  Source: .Blood Blood-Peripheral  Preliminary Report (01 Jan 2024 22:01):    No growth at 72 Hours        RADIOLOGY & ADDITIONAL TESTS:    Care Discussed with Consultants/Other Providers:    Patient is a 53y old  Female who presents with a chief complaint of pain and reddness right breast      Patient seen and examined at bedside.  Pt without complaints this am, ready to go home     ALLERGIES:  bacitracin (Rash)  latex (Unknown)    MEDICATIONS  (STANDING):  divalproex DR 1250 milliGRAM(s) Oral at bedtime  lamoTRIgine 200 milliGRAM(s) Oral at bedtime  piperacillin/tazobactam IVPB.. 3.375 Gram(s) IV Intermittent every 8 hours  polyethylene glycol 3350 17 Gram(s) Oral two times a day  senna 2 Tablet(s) Oral at bedtime  sodium chloride 0.9%. 1000 milliLiter(s) (75 mL/Hr) IV Continuous <Continuous>  vancomycin  IVPB 1000 milliGRAM(s) IV Intermittent every 12 hours  venlafaxine 75 milliGRAM(s) Oral with breakfast    MEDICATIONS  (PRN):  acetaminophen     Tablet .. 650 milliGRAM(s) Oral every 6 hours PRN Temp greater or equal to 38C (100.4F), Mild Pain (1 - 3), Moderate Pain (4 - 6)  ondansetron Injectable 4 milliGRAM(s) IV Push every 6 hours PRN Nausea and/or Vomiting    Vital Signs Last 24 Hrs  T(F): 99.2 (02 Jan 2024 05:00), Max: 99.2 (02 Jan 2024 05:00)  HR: 66 (02 Jan 2024 05:00) (60 - 71)  BP: 113/53 (02 Jan 2024 05:00) (113/53 - 120/99)  RR: 18 (02 Jan 2024 05:00) (14 - 18)  SpO2: 98% (02 Jan 2024 05:00) (98% - 100%)  I&O's Summary    01 Jan 2024 07:01  -  02 Jan 2024 07:00  --------------------------------------------------------  IN: 1450 mL / OUT: 49 mL / NET: 1401 mL      PHYSICAL EXAM:  General:52 y/o female in NAD, A/O x 3  ENT: MMM  Breast- right breast with decreased reddnes and induration, much improved.  Lft breast still with drain present   Neck: Supple, No JVD  Lungs: Clear to auscultation bilaterally, Non labored breathing   Cardio: RRR, S1/S2, No murmurs  Abdomen: Soft, Nontender, Nondistended; Bowel sounds present  Extremities: No calf tenderness, No pitting edema    LABS:                        11.2   9.20  )-----------( 230      ( 01 Jan 2024 06:00 )             33.3     01-01    142  |  106  |  8   ----------------------------<  115  4.9   |  29  |  0.77    Ca    8.9      01 Jan 2024 06:00  Mg     2.3     12-31    TPro  5.7  /  Alb  2.3  /  TBili  0.2  /  DBili  x   /  AST  41  /  ALT  42  /  AlkPhos  51  12-31                                Urinalysis Basic - ( 01 Jan 2024 06:00 )    Color: x / Appearance: x / SG: x / pH: x  Gluc: 115 mg/dL / Ketone: x  / Bili: x / Urobili: x   Blood: x / Protein: x / Nitrite: x   Leuk Esterase: x / RBC: x / WBC x   Sq Epi: x / Non Sq Epi: x / Bacteria: x        Culture - Blood (collected 29 Dec 2023 17:00)  Source: .Blood Blood-Peripheral  Preliminary Report (01 Jan 2024 22:02):    No growth at 72 Hours    Culture - Blood (collected 29 Dec 2023 16:45)  Source: .Blood Blood-Peripheral  Preliminary Report (01 Jan 2024 22:01):    No growth at 72 Hours        RADIOLOGY & ADDITIONAL TESTS:    Care Discussed with Consultants/Other Providers:

## 2024-01-03 ENCOUNTER — APPOINTMENT (OUTPATIENT)
Dept: HEMATOLOGY ONCOLOGY | Facility: CLINIC | Age: 54
End: 2024-01-03

## 2024-01-03 LAB
CULTURE RESULTS: SIGNIFICANT CHANGE UP
SPECIMEN SOURCE: SIGNIFICANT CHANGE UP

## 2024-01-05 ENCOUNTER — APPOINTMENT (OUTPATIENT)
Dept: PLASTIC SURGERY | Facility: CLINIC | Age: 54
End: 2024-01-05
Payer: COMMERCIAL

## 2024-01-05 PROCEDURE — 99024 POSTOP FOLLOW-UP VISIT: CPT

## 2024-01-08 NOTE — END OF VISIT
[FreeTextEntry3] : All medical record entries made by the Scribe were at my, NIGEL Clements, direction and personally dictated by me on 01/05/2024. I have reviewed the chart and agree that the record accurately reflects my personal performance of the history, physical exam, assessment and plan. I have also personally directed, reviewed, and agreed with the chart.

## 2024-01-08 NOTE — HISTORY OF PRESENT ILLNESS
[FreeTextEntry1] : Lorena Nunez is a 53 year old female 3 wks s/p revision of bilateral breast reconstruction with exchange of 600cc breast implants (right implant ruptured, left intact) with 500cc implants, with exchange from a subpectoral to a prepectoral plane; creation of prepectoral pocket; repair of bilateral pectoralis muscle; bilateral capsulectomy; and placement of right alloderm for improved soft tissue support on 12/12/23. Last week, patient was admitted to [facility] for IV antibiotics with some improvement. Patient presented to the office today for left drain removal, but patient also complaining about worsening redness to the right breast and some tenderness to the inferior aspect. She denies any fever or chills. As per patient, remaining drain meets criteria for removal today.

## 2024-01-08 NOTE — ADDENDUM
[FreeTextEntry1] : I, Forest Walsh, documented this note as a scribe on behalf of NIGEL Clements on 01/05/2024.

## 2024-01-08 NOTE — PHYSICAL EXAM
[de-identified] : NAD, AxOx3  [de-identified] : nonlabored breathing  [de-identified] : left breast incisions clean, dry, and intact drain is serous  no evidence of infection  right breast with swelling noted, mild amount of erythema and scattered erythematous rash, nonpruritic; no warmth appreciated overlying the erythematous area minimal tenderness to the inferior aspect of the breast no evidence of hematoma or fluid collection [de-identified] : no edema  [de-identified] : as above [de-identified] : normal affect

## 2024-01-09 ENCOUNTER — APPOINTMENT (OUTPATIENT)
Dept: PLASTIC SURGERY | Facility: CLINIC | Age: 54
End: 2024-01-09
Payer: COMMERCIAL

## 2024-01-09 VITALS
TEMPERATURE: 97.6 F | DIASTOLIC BLOOD PRESSURE: 83 MMHG | WEIGHT: 152 LBS | HEIGHT: 64.5 IN | HEART RATE: 109 BPM | SYSTOLIC BLOOD PRESSURE: 137 MMHG | BODY MASS INDEX: 25.64 KG/M2 | OXYGEN SATURATION: 99 %

## 2024-01-09 DIAGNOSIS — T85.42XA DISPLACEMENT OF BREAST PROSTHESIS AND IMPLANT, INITIAL ENCOUNTER: ICD-10-CM

## 2024-01-09 PROCEDURE — 99024 POSTOP FOLLOW-UP VISIT: CPT

## 2024-01-11 ENCOUNTER — APPOINTMENT (OUTPATIENT)
Dept: PLASTIC SURGERY | Facility: CLINIC | Age: 54
End: 2024-01-11
Payer: COMMERCIAL

## 2024-01-11 PROBLEM — T85.42XA: Status: ACTIVE | Noted: 2020-04-16

## 2024-01-11 PROCEDURE — 99024 POSTOP FOLLOW-UP VISIT: CPT

## 2024-01-11 NOTE — PHYSICAL EXAM
[de-identified] : NAD, AxOx3  [de-identified] : nonlabored breathing  [de-identified] : residual faint erythema of the lower pole of the patient's right breast, which is improved from last visit right breast also shows persistent but improving swelling [de-identified] : no edema  [de-identified] : patient has excoriations and evidence of resolving hives across the anterior torso, back, chest, and abdomen, sparing only the left breast reconstruction site  [de-identified] : normal affect

## 2024-01-11 NOTE — ADDENDUM
[FreeTextEntry1] : I, Forest Walsh, documented this note as a scribe on behalf of Dr. Lauren Shikowitz-Behr, MD on 01/09/2024.

## 2024-01-11 NOTE — END OF VISIT
[FreeTextEntry3] : All medical record entries made by the Scribe were at my, Dr. Lauren Shikowitz-Behr, MD, direction and personally dictated by me on 01/09/2024. I have reviewed the chart and agree that the record accurately reflects my personal performance of the history, physical exam, assessment and plan. I have also personally directed, reviewed, and agreed with the chart.

## 2024-01-12 ENCOUNTER — APPOINTMENT (OUTPATIENT)
Dept: MRI IMAGING | Facility: CLINIC | Age: 54
End: 2024-01-12
Payer: COMMERCIAL

## 2024-01-12 ENCOUNTER — NON-APPOINTMENT (OUTPATIENT)
Age: 54
End: 2024-01-12

## 2024-01-12 ENCOUNTER — OUTPATIENT (OUTPATIENT)
Dept: OUTPATIENT SERVICES | Facility: HOSPITAL | Age: 54
LOS: 1 days | End: 2024-01-12
Payer: COMMERCIAL

## 2024-01-12 VITALS — HEIGHT: 64.5 IN | WEIGHT: 152 LBS | BODY MASS INDEX: 25.64 KG/M2

## 2024-01-12 DIAGNOSIS — Z42.1 ENCOUNTER FOR BREAST RECONSTRUCTION FOLLOWING MASTECTOMY: ICD-10-CM

## 2024-01-12 DIAGNOSIS — Z90.13 ACQUIRED ABSENCE OF BILATERAL BREASTS AND NIPPLES: Chronic | ICD-10-CM

## 2024-01-12 DIAGNOSIS — Z98.890 OTHER SPECIFIED POSTPROCEDURAL STATES: Chronic | ICD-10-CM

## 2024-01-12 DIAGNOSIS — Z01.818 ENCOUNTER FOR OTHER PREPROCEDURAL EXAMINATION: ICD-10-CM

## 2024-01-12 DIAGNOSIS — C50.919 MALIGNANT NEOPLASM OF UNSPECIFIED SITE OF UNSPECIFIED FEMALE BREAST: ICD-10-CM

## 2024-01-12 PROCEDURE — 74185 MRA ABD W OR W/O CNTRST: CPT | Mod: 26

## 2024-01-12 PROCEDURE — C8902: CPT

## 2024-01-12 PROCEDURE — A9585: CPT

## 2024-01-12 PROCEDURE — C8920: CPT

## 2024-01-12 PROCEDURE — 72198 MR ANGIO PELVIS W/O & W/DYE: CPT | Mod: 26

## 2024-01-16 ENCOUNTER — APPOINTMENT (OUTPATIENT)
Dept: PLASTIC SURGERY | Facility: CLINIC | Age: 54
End: 2024-01-16
Payer: COMMERCIAL

## 2024-01-16 VITALS
SYSTOLIC BLOOD PRESSURE: 119 MMHG | TEMPERATURE: 98.1 F | BODY MASS INDEX: 24.96 KG/M2 | WEIGHT: 148 LBS | DIASTOLIC BLOOD PRESSURE: 77 MMHG | HEART RATE: 96 BPM | HEIGHT: 64.5 IN | OXYGEN SATURATION: 98 %

## 2024-01-16 DIAGNOSIS — C50.919 MALIGNANT NEOPLASM OF UNSPECIFIED SITE OF UNSPECIFIED FEMALE BREAST: ICD-10-CM

## 2024-01-16 PROCEDURE — 99024 POSTOP FOLLOW-UP VISIT: CPT

## 2024-01-17 NOTE — HISTORY OF PRESENT ILLNESS
[FreeTextEntry1] : Lorena Nunez is a 53 year old female s/p revision of bilateral breast reconstruction with exchange of 600cc breast implants (right implant ruptured, left intact) with 500cc implants, with exchange from a subpectoral to a prepectoral plane; creation of prepectoral pocket; repair of bilateral pectoralis muscle; bilateral capsulectomy; and placement of right alloderm for improved soft tissue support on 12/12/23.  The patient developed right breast cellulitis which transpired after removal of a surgical drain. She had some recovery from the cellulitis after treatment with IV antibiotics. She then developed a diffuse abdominal rash after treatment with cefdinir, which was then stopped, and patient was instructed to take Benadryl for her symptoms.  Myself and my partner, Dr. Prabhakar Levy, both saw and evaluated Lorena last week and discussed the likelihood of implant salvage vs. likely need for implant removal and possible surgical options for removal of implant without reconstruction as well as for removal of implant with delayed reconstruction with autologous tissue in the form of a HÉCTOR flap.   Patient returns today for follow-up. She is now choosing HÉCTOR flap reconstruction

## 2024-01-17 NOTE — ADDENDUM
[FreeTextEntry1] :  I, Violette Osman, documented this note as a scribe on behalf of Dr. Lauren Shikowitz-Behr, MD on 01/16/2024.

## 2024-01-17 NOTE — PHYSICAL EXAM
[de-identified] : NAD, AxOX3  [de-identified] : nonlabored breathing  [de-identified] : right breast significantly improved with barely appreciable faint erythema at lower pole incisions are well healed and swelling is significantly resolved as well as drug rash [de-identified] : no edema  [de-identified] : as above  [de-identified] : normal affect

## 2024-01-22 ENCOUNTER — OUTPATIENT (OUTPATIENT)
Dept: OUTPATIENT SERVICES | Facility: HOSPITAL | Age: 54
LOS: 1 days | End: 2024-01-22
Payer: COMMERCIAL

## 2024-01-22 VITALS
RESPIRATION RATE: 18 BRPM | WEIGHT: 149.91 LBS | SYSTOLIC BLOOD PRESSURE: 109 MMHG | OXYGEN SATURATION: 99 % | TEMPERATURE: 99 F | HEIGHT: 65 IN | HEART RATE: 79 BPM | DIASTOLIC BLOOD PRESSURE: 73 MMHG

## 2024-01-22 DIAGNOSIS — N65.1 DISPROPORTION OF RECONSTRUCTED BREAST: ICD-10-CM

## 2024-01-22 DIAGNOSIS — G47.33 OBSTRUCTIVE SLEEP APNEA (ADULT) (PEDIATRIC): ICD-10-CM

## 2024-01-22 DIAGNOSIS — Z85.3 PERSONAL HISTORY OF MALIGNANT NEOPLASM OF BREAST: ICD-10-CM

## 2024-01-22 DIAGNOSIS — Z98.890 OTHER SPECIFIED POSTPROCEDURAL STATES: Chronic | ICD-10-CM

## 2024-01-22 DIAGNOSIS — N65.0 DEFORMITY OF RECONSTRUCTED BREAST: ICD-10-CM

## 2024-01-22 DIAGNOSIS — Z90.13 ACQUIRED ABSENCE OF BILATERAL BREASTS AND NIPPLES: ICD-10-CM

## 2024-01-22 DIAGNOSIS — Z90.13 ACQUIRED ABSENCE OF BILATERAL BREASTS AND NIPPLES: Chronic | ICD-10-CM

## 2024-01-22 LAB
BLD GP AB SCN SERPL QL: SIGNIFICANT CHANGE UP
HCT VFR BLD CALC: 40.6 % — SIGNIFICANT CHANGE UP (ref 34.5–45)
HGB BLD-MCNC: 14.2 G/DL — SIGNIFICANT CHANGE UP (ref 11.5–15.5)

## 2024-01-22 PROCEDURE — 86900 BLOOD TYPING SEROLOGIC ABO: CPT

## 2024-01-22 PROCEDURE — G0463: CPT

## 2024-01-22 PROCEDURE — 86850 RBC ANTIBODY SCREEN: CPT

## 2024-01-22 PROCEDURE — 86901 BLOOD TYPING SEROLOGIC RH(D): CPT

## 2024-01-22 PROCEDURE — 85014 HEMATOCRIT: CPT

## 2024-01-22 PROCEDURE — 85018 HEMOGLOBIN: CPT

## 2024-01-22 PROCEDURE — 36415 COLL VENOUS BLD VENIPUNCTURE: CPT

## 2024-01-22 RX ORDER — DEXTROAMPHETAMINE SACCHARATE, AMPHETAMINE ASPARTATE, DEXTROAMPHETAMINE SULFATE AND AMPHETAMINE SULFATE 1.875; 1.875; 1.875; 1.875 MG/1; MG/1; MG/1; MG/1
1 TABLET ORAL
Refills: 0 | DISCHARGE

## 2024-01-22 NOTE — H&P PST ADULT - NSICDXPASTSURGICALHX_GEN_ALL_CORE_FT
PAST SURGICAL HISTORY:  H/O bilateral mastectomy 2012 saline implants    H/O breast reconstruction     History of D&C

## 2024-01-22 NOTE — H&P PST ADULT - PROBLEM SELECTOR PLAN 1
Scheduled for delayed bilateral microsurgical breast reconstruction with DEEP inferior epigastric artery  flaps, removal of bilateral breast implants with Dr Levy on 02/06/2024.  Pre op instructions given and patient verbalized understanding.  CBC, CMP, EKG on chart.  Pending Hgb/ Hct and T&S.  NPO after midnight night before procedure.  To stop all ASA, NSAIDs, vitamins and supplements 1 week prior to procedure.  Chlorhexidene wash given with instructions Scheduled for delayed bilateral microsurgical breast reconstruction with DEEP inferior epigastric artery  flaps, removal of bilateral breast implants with Dr Levy on 02/06/2024.  Pre op instructions given and patient verbalized understanding.  CBC, CMP, EKG on chart.  Pending medical clearance, Hgb/ Hct and T&S.  NPO after midnight night before procedure.  To stop all ASA, NSAIDs, vitamins and supplements 1 week prior to procedure.  Chlorhexidene wash given with instructions

## 2024-01-22 NOTE — H&P PST ADULT - HISTORY OF PRESENT ILLNESS
52 yo F with h/o depression, bipolar disorder, YOLIS on CPAP, left breast cancer  (was on Tamoxifen till 2021) s/p bilateral mastectomy and reconstruction in 2013 with Dr. Boggs at [facility] presents for PST.  Scheduled for delayed bilateral microsurgical breast reconstruction with DEEP inferior epigastric artery  flaps, removal of bilateral breast implants with Dr Levy on 02/06/2024.   52 yo F with h/o depression, bipolar disorder, YOLIS on CPAP, left breast cancer  (was on Tamoxifen till 2021) s/p bilateral mastectomy and reconstruction in 2013 with Dr. Boggs at [facility] presents for PST.  Patient had implant exchange in December 2023, with complications of right breast cellulitis requiring hospitalization early Jan 2024 with IV antibiotics complete with oral antibiotics at home but reports still having issues with right breast implant.  Currently feeling well with no cough, fever or acute illness  Scheduled for delayed bilateral microsurgical breast reconstruction with DEEP inferior epigastric artery  flaps, removal of bilateral breast implants with Dr Levy on 02/06/2024.

## 2024-01-23 ENCOUNTER — APPOINTMENT (OUTPATIENT)
Dept: PLASTIC SURGERY | Facility: CLINIC | Age: 54
End: 2024-01-23
Payer: COMMERCIAL

## 2024-01-23 DIAGNOSIS — N65.0 DEFORMITY OF RECONSTRUCTED BREAST: ICD-10-CM

## 2024-01-23 DIAGNOSIS — Z85.3 PERSONAL HISTORY OF MALIGNANT NEOPLASM OF BREAST: ICD-10-CM

## 2024-01-23 DIAGNOSIS — T85.43XS LEAKAGE OF BREAST PROSTHESIS AND IMPLANT, SEQUELA: ICD-10-CM

## 2024-01-23 DIAGNOSIS — T85.44XS CAPSULAR CONTRACTURE OF BREAST IMPLANT, SEQUELA: ICD-10-CM

## 2024-01-23 PROCEDURE — 99024 POSTOP FOLLOW-UP VISIT: CPT

## 2024-01-26 ENCOUNTER — APPOINTMENT (OUTPATIENT)
Dept: PLASTIC SURGERY | Facility: CLINIC | Age: 54
End: 2024-01-26
Payer: COMMERCIAL

## 2024-01-26 VITALS
DIASTOLIC BLOOD PRESSURE: 83 MMHG | BODY MASS INDEX: 24.96 KG/M2 | OXYGEN SATURATION: 97 % | SYSTOLIC BLOOD PRESSURE: 125 MMHG | HEART RATE: 88 BPM | WEIGHT: 148 LBS | HEIGHT: 64.5 IN | TEMPERATURE: 98.4 F

## 2024-01-26 PROBLEM — T85.43XS: Status: ACTIVE | Noted: 2023-12-30

## 2024-01-26 PROBLEM — T85.44XS CAPSULAR CONTRACTURE OF BREAST IMPLANT, SEQUELA: Status: ACTIVE | Noted: 2023-07-24

## 2024-01-26 PROBLEM — Z85.3 PERSONAL HISTORY OF BREAST CANCER: Status: ACTIVE | Noted: 2023-12-30

## 2024-01-26 PROBLEM — N65.0 BREAST RECONSTRUCTION DEFORMITY: Status: ACTIVE | Noted: 2023-12-30

## 2024-01-26 PROCEDURE — 99024 POSTOP FOLLOW-UP VISIT: CPT

## 2024-01-26 PROCEDURE — 99215 OFFICE O/P EST HI 40 MIN: CPT

## 2024-01-26 RX ORDER — TRAMADOL HYDROCHLORIDE 50 MG/1
50 TABLET, COATED ORAL
Qty: 20 | Refills: 0 | Status: ACTIVE | COMMUNITY
Start: 2024-01-26 | End: 1900-01-01

## 2024-01-26 RX ORDER — IBUPROFEN 800 MG/1
800 TABLET, FILM COATED ORAL
Qty: 20 | Refills: 0 | Status: ACTIVE | COMMUNITY
Start: 2024-01-26 | End: 1900-01-01

## 2024-01-26 RX ORDER — ASPIRIN ENTERIC COATED TABLETS 81 MG 81 MG/1
81 TABLET, DELAYED RELEASE ORAL
Qty: 21 | Refills: 0 | Status: ACTIVE | COMMUNITY
Start: 2024-01-26 | End: 1900-01-01

## 2024-01-26 RX ORDER — OXYCODONE 5 MG/1
5 TABLET ORAL
Qty: 12 | Refills: 0 | Status: DISCONTINUED | COMMUNITY
Start: 2023-12-08 | End: 2024-01-26

## 2024-01-26 RX ORDER — DIAZEPAM 5 MG/1
5 TABLET ORAL 3 TIMES DAILY
Qty: 15 | Refills: 0 | Status: DISCONTINUED | COMMUNITY
Start: 2023-12-12 | End: 2024-01-26

## 2024-01-31 ENCOUNTER — APPOINTMENT (OUTPATIENT)
Dept: INFECTIOUS DISEASE | Facility: CLINIC | Age: 54
End: 2024-01-31
Payer: COMMERCIAL

## 2024-01-31 VITALS
WEIGHT: 148 LBS | OXYGEN SATURATION: 98 % | HEART RATE: 90 BPM | DIASTOLIC BLOOD PRESSURE: 79 MMHG | TEMPERATURE: 97.2 F | SYSTOLIC BLOOD PRESSURE: 118 MMHG | BODY MASS INDEX: 25.01 KG/M2

## 2024-01-31 PROCEDURE — 99215 OFFICE O/P EST HI 40 MIN: CPT

## 2024-01-31 RX ORDER — CEFADROXIL 500 MG/1
500 CAPSULE ORAL TWICE DAILY
Qty: 14 | Refills: 0 | Status: DISCONTINUED | COMMUNITY
Start: 2023-12-08 | End: 2024-01-31

## 2024-01-31 RX ORDER — TRAMADOL HYDROCHLORIDE 50 MG/1
50 TABLET, COATED ORAL
Qty: 12 | Refills: 0 | Status: DISCONTINUED | COMMUNITY
Start: 2023-12-14 | End: 2024-01-31

## 2024-01-31 RX ORDER — SULFAMETHOXAZOLE AND TRIMETHOPRIM 400; 80 MG/1; MG/1
400-80 TABLET ORAL TWICE DAILY
Qty: 14 | Refills: 0 | Status: DISCONTINUED | COMMUNITY
Start: 2024-01-11 | End: 2024-01-31

## 2024-01-31 NOTE — END OF VISIT
[facility] Pooja Bright
Shanon Casper/Yaneth
[facility] Florina Lepe
[Time Spent: ___ minutes] : I have spent [unfilled] minutes of time on the encounter.

## 2024-01-31 NOTE — CONSULT LETTER
[Dear  ___] : Dear  [unfilled], [( Thank you for referring [unfilled] for consultation for _____ )] : Thank you for referring [unfilled] for consultation for [unfilled] [Please see my note below.] : Please see my note below. [FreeTextEntry3] : Thank you for allowing me to participate in the care of this patient.  Please feel free to contact me with any questions.  Sincerely,  Michael I. Oppenheim, MD  [FreeTextEntry2] : Dr. Prabhakar Levy

## 2024-01-31 NOTE — DATA REVIEWED
[FreeTextEntry1] : 12/29 US: FINDINGS: The reconstructed breast demonstrates a mostly fatty echotexture.  In the lower outer quadrant, there is a small heterogeneous collection, without evidence of drainable collection . There is diffuse subcutaneous edema with evidence of reverberation artifact, suggestive of subcutaneous emphysema.  Breast implant is noted.  IMPRESSION: No drainable collection. Diffuse subcutaneous edema with suggestion of subcutaneous emphysema. These findings were discussed with Dr. Levy on 12/29/2023 by Dr. Reilly.

## 2024-01-31 NOTE — HISTORY OF PRESENT ILLNESS
[FreeTextEntry1] : 53 year old with history of breast cancer, underwent bilateral mastectomy with reconstructive implants in 2012. Treated with tamoxifen x 8 years.  Developed mechanical issues with implants in mid-2023, saw Dr. Levy for potential revision. Ultimately found to have rupture of right implant. Underwent revision on 12/12/23. Right sided drain removed on 12/28. Initially everything fine, but that evening developed dizziness, nausea, chills, fever to 102, tenderness, swelling and redness of right breast.  Went to [facility] where was found to be hypotensive to 80/?.  No positive cultures from admission (blood cultures negative). Treated with Vanco/Zosyn x 5 days while inpatient, changed  oral Cefdinir on discharge; developed total body rash on cefdinir. Self D/C'd, saw Dr. Levy - given TMP/SMX which she took x 1 week; finished about 1 week ago.  Planning for HÉCTOR flap instead of implants. Scheduled for February 6th.  Does not have infection-type complaints in right breast today. No redness or tenderness.

## 2024-01-31 NOTE — PHYSICAL EXAM
[General Appearance - Alert] : alert [General Appearance - In No Acute Distress] : in no acute distress [General Appearance - Well-Appearing] : healthy appearing [Sclera] : the sclera and conjunctiva were normal [PERRL With Normal Accommodation] : pupils were equal in size, round, reactive to light [Extraocular Movements] : extraocular movements were intact [Outer Ear] : the ears and nose were normal in appearance [Oropharynx] : the oropharynx was normal with no thrush [Neck Appearance] : the appearance of the neck was normal [Neck Cervical Mass (___cm)] : no neck mass was observed [Auscultation Breath Sounds / Voice Sounds] : lungs were clear to auscultation bilaterally [Heart Rate And Rhythm] : heart rate was normal and rhythm regular [Heart Sounds] : normal S1 and S2 [Heart Sounds Gallop] : no gallops [Murmurs] : no murmurs [Heart Sounds Pericardial Friction Rub] : no pericardial rub [Bowel Sounds] : normal bowel sounds [Abdomen Soft] : soft [Abdomen Tenderness] : non-tender [] : no hepato-splenomegaly [Abdomen Mass (___ Cm)] : no abdominal mass palpated [FreeTextEntry1] : Skin overlying right implant with some discoloration around old drain site; drain site well-healed. No erthema, tenderness, fluctuance appreciated in right breast.

## 2024-01-31 NOTE — ASSESSMENT
[FreeTextEntry1] : 53 year old with cellulitis of right breast after surgical replacement of implants. Responded rapidly to treatment suggesting limited to skin, but ultrasound with some suggestion of deeper involvement. Also with probably cephalosporin allergy. Plan is for HÉCTOR flap reconstruction on 2/6. Will repeat ultrasound ASAP to confirm that there is no evidence of residual deep infection - if there is, will need to consider implant removal and antibiotics with flap at a later date. If no evidence of deeper infection on ultrasound, would aggressively cover patient during and post-operatively (while graft is "taking" / maturing). Given potential cephalosporin allergy, would cover pre-operatively with Vancomycin 1 gm and Ciprofloxacin 500 mg, and give 3-5 days of TMP/SMX (1 DS BID) + Levofloxacin (500 mg qD) post-operatively to protect graft.

## 2024-02-01 ENCOUNTER — RESULT REVIEW (OUTPATIENT)
Age: 54
End: 2024-02-01

## 2024-02-01 ENCOUNTER — APPOINTMENT (OUTPATIENT)
Dept: ULTRASOUND IMAGING | Facility: CLINIC | Age: 54
End: 2024-02-01
Payer: COMMERCIAL

## 2024-02-01 ENCOUNTER — OUTPATIENT (OUTPATIENT)
Dept: OUTPATIENT SERVICES | Facility: HOSPITAL | Age: 54
LOS: 1 days | End: 2024-02-01
Payer: COMMERCIAL

## 2024-02-01 DIAGNOSIS — N61.0 MASTITIS WITHOUT ABSCESS: ICD-10-CM

## 2024-02-01 DIAGNOSIS — Z90.13 ACQUIRED ABSENCE OF BILATERAL BREASTS AND NIPPLES: Chronic | ICD-10-CM

## 2024-02-01 DIAGNOSIS — Z98.890 OTHER SPECIFIED POSTPROCEDURAL STATES: Chronic | ICD-10-CM

## 2024-02-01 PROCEDURE — 76642 ULTRASOUND BREAST LIMITED: CPT

## 2024-02-01 PROCEDURE — 76642 ULTRASOUND BREAST LIMITED: CPT | Mod: 26,RT

## 2024-02-02 RX ORDER — SULFAMETHOXAZOLE AND TRIMETHOPRIM 800; 160 MG/1; MG/1
800-160 TABLET ORAL
Qty: 10 | Refills: 0 | Status: ACTIVE | COMMUNITY
Start: 2024-02-02 | End: 1900-01-01

## 2024-02-02 RX ORDER — LEVOFLOXACIN 500 MG/1
500 TABLET, FILM COATED ORAL DAILY
Qty: 5 | Refills: 0 | Status: ACTIVE | COMMUNITY
Start: 2024-02-02 | End: 1900-01-01

## 2024-02-05 ENCOUNTER — TRANSCRIPTION ENCOUNTER (OUTPATIENT)
Age: 54
End: 2024-02-05

## 2024-02-05 ENCOUNTER — APPOINTMENT (OUTPATIENT)
Dept: PLASTIC SURGERY | Facility: CLINIC | Age: 54
End: 2024-02-05
Payer: COMMERCIAL

## 2024-02-05 VITALS
RESPIRATION RATE: 16 BRPM | WEIGHT: 148 LBS | BODY MASS INDEX: 24.96 KG/M2 | OXYGEN SATURATION: 98 % | DIASTOLIC BLOOD PRESSURE: 77 MMHG | SYSTOLIC BLOOD PRESSURE: 118 MMHG | HEIGHT: 64.5 IN | HEART RATE: 90 BPM

## 2024-02-05 PROCEDURE — ZZZZZ: CPT

## 2024-02-05 NOTE — PHYSICAL EXAM
[NI] : Normal [de-identified] : NAD, AxOx3  [de-identified] : nonlabored breathing  [de-identified] : left breast well healed  right breast well healed  no sign of residual infection  very mild residual swelling [de-identified] : no edema  [de-identified] : normal affect

## 2024-02-05 NOTE — HISTORY OF PRESENT ILLNESS
[FreeTextEntry1] : Lorena Nunez is a 53 year old female s/p revision of bilateral breast reconstruction with exchange of 600cc breast implants (right implant ruptured, left intact) with 500cc implants, with exchange from a subpectoral to a prepectoral plane; creation of prepectoral pocket; repair of bilateral pectoralis muscle; bilateral capsulectomy; and placement of right alloderm for improved soft tissue support on 12/12/23.  The patient developed right breast cellulitis which transpired after removal of a surgical drain. She recovered from the cellulitis after treatment with IV antibiotics. However, Lorena has continued concerned regarding implant reconstruction and is considering definitive reconstruction with autologous tissue.   Patient returns today for follow-up. She is scheduled HÉCTOR flap reconstruction on 2/6/2024.

## 2024-02-05 NOTE — ADDENDUM
[FreeTextEntry1] :  I, Violette Osman, documented this note as a scribe on behalf of Dr. Lauren Shikowitz-Behr, MD on 01/23/2024.

## 2024-02-05 NOTE — END OF VISIT
[FreeTextEntry3] : All medical record entries made by the Scribe were at my, Dr. Lauren Shikowitz-Behr, MD, direction and personally dictated by me on 01/23/2024. I have reviewed the chart and agree that the record accurately reflects my personal performance of the history, physical exam, assessment and plan. I have also personally directed, reviewed, and agreed with the chart.

## 2024-02-05 NOTE — PATIENT PROFILE ADULT - FUNCTIONAL ASSESSMENT - DAILY ACTIVITY 2.
Patient presents to the [facility] with c/o pain and swelling to his left elbow. Onset Friday. No known injury. Patient states that he is unable to straighten affected arm due to pain but has good hand grasp. Patient believes that he may have slept on his arm wrong. Has been taking Tylenol and applying ice pack.    4 = No assist / stand by assistance

## 2024-02-06 ENCOUNTER — APPOINTMENT (OUTPATIENT)
Dept: PLASTIC SURGERY | Facility: HOSPITAL | Age: 54
End: 2024-02-06
Payer: COMMERCIAL

## 2024-02-06 ENCOUNTER — RESULT REVIEW (OUTPATIENT)
Age: 54
End: 2024-02-06

## 2024-02-06 ENCOUNTER — INPATIENT (INPATIENT)
Facility: HOSPITAL | Age: 54
LOS: 1 days | Discharge: HOME | DRG: 581 | End: 2024-02-08
Attending: PLASTIC SURGERY | Admitting: PLASTIC SURGERY
Payer: COMMERCIAL

## 2024-02-06 VITALS
RESPIRATION RATE: 14 BRPM | SYSTOLIC BLOOD PRESSURE: 97 MMHG | TEMPERATURE: 100 F | WEIGHT: 149.91 LBS | HEART RATE: 66 BPM | HEIGHT: 65 IN | DIASTOLIC BLOOD PRESSURE: 65 MMHG

## 2024-02-06 DIAGNOSIS — Z90.13 ACQUIRED ABSENCE OF BILATERAL BREASTS AND NIPPLES: Chronic | ICD-10-CM

## 2024-02-06 DIAGNOSIS — Z98.890 OTHER SPECIFIED POSTPROCEDURAL STATES: Chronic | ICD-10-CM

## 2024-02-06 DIAGNOSIS — N65.1 DISPROPORTION OF RECONSTRUCTED BREAST: ICD-10-CM

## 2024-02-06 PROCEDURE — 19370 REVJ PERI-IMPLT CAPSULE BRST: CPT | Mod: 58,RT

## 2024-02-06 PROCEDURE — 21600 PARTIAL REMOVAL OF RIB: CPT | Mod: 58,RT,59

## 2024-02-06 PROCEDURE — 21600 PARTIAL REMOVAL OF RIB: CPT | Mod: 78,LT,59

## 2024-02-06 PROCEDURE — 38530 BIOPSY/REMOVAL LYMPH NODES: CPT | Mod: 58,RT

## 2024-02-06 PROCEDURE — 88305 TISSUE EXAM BY PATHOLOGIST: CPT | Mod: 26

## 2024-02-06 PROCEDURE — 38530 BIOPSY/REMOVAL LYMPH NODES: CPT | Mod: 78,LT

## 2024-02-06 PROCEDURE — S2068: CPT | Mod: LT

## 2024-02-06 PROCEDURE — 15877 SUCTION LIPECTOMY TRUNK: CPT | Mod: 58

## 2024-02-06 PROCEDURE — 88300 SURGICAL PATH GROSS: CPT | Mod: 26,59

## 2024-02-06 PROCEDURE — 19370 REVJ PERI-IMPLT CAPSULE BRST: CPT | Mod: 78,LT

## 2024-02-06 PROCEDURE — S2068: CPT | Mod: RT

## 2024-02-06 PROCEDURE — 88304 TISSUE EXAM BY PATHOLOGIST: CPT | Mod: 26

## 2024-02-06 DEVICE — LIGATING CLIPS WECK HORIZON SMALL (YELLOW) 24: Type: IMPLANTABLE DEVICE | Site: BILATERAL | Status: FUNCTIONAL

## 2024-02-06 DEVICE — CLIP APPLIER ETHICON LIGACLIP 9 3/8" SMALL: Type: IMPLANTABLE DEVICE | Site: BILATERAL | Status: FUNCTIONAL

## 2024-02-06 DEVICE — CLIP APPLIER ETHICON LIGACLIP 11.5" MEDIUM: Type: IMPLANTABLE DEVICE | Site: BILATERAL | Status: FUNCTIONAL

## 2024-02-06 DEVICE — CARTRIDGE MICROCLIP 30: Type: IMPLANTABLE DEVICE | Site: BILATERAL | Status: FUNCTIONAL

## 2024-02-06 DEVICE — LIGATING CLIPS WECK HORIZON MEDIUM (BLUE) 6: Type: IMPLANTABLE DEVICE | Site: BILATERAL | Status: FUNCTIONAL

## 2024-02-06 DEVICE — MESH PHASIX 2.4X6.3IN: Type: IMPLANTABLE DEVICE | Site: BILATERAL | Status: FUNCTIONAL

## 2024-02-06 DEVICE — COUPLER VESSEL ANASTOMOTIC 2.5MM: Type: IMPLANTABLE DEVICE | Site: BILATERAL | Status: FUNCTIONAL

## 2024-02-06 RX ORDER — DIVALPROEX SODIUM 500 MG/1
5 TABLET, DELAYED RELEASE ORAL
Qty: 0 | Refills: 0 | DISCHARGE

## 2024-02-06 RX ORDER — VANCOMYCIN HCL 1 G
1000 VIAL (EA) INTRAVENOUS ONCE
Refills: 0 | Status: COMPLETED | OUTPATIENT
Start: 2024-02-06 | End: 2024-02-06

## 2024-02-06 RX ORDER — OXYCODONE HYDROCHLORIDE 5 MG/1
10 TABLET ORAL EVERY 4 HOURS
Refills: 0 | Status: DISCONTINUED | OUTPATIENT
Start: 2024-02-06 | End: 2024-02-06

## 2024-02-06 RX ORDER — VENLAFAXINE HCL 75 MG
75 CAPSULE, EXT RELEASE 24 HR ORAL DAILY
Refills: 0 | Status: DISCONTINUED | OUTPATIENT
Start: 2024-02-06 | End: 2024-02-08

## 2024-02-06 RX ORDER — CIPROFLOXACIN LACTATE 400MG/40ML
500 VIAL (ML) INTRAVENOUS ONCE
Refills: 0 | Status: DISCONTINUED | OUTPATIENT
Start: 2024-02-06 | End: 2024-02-06

## 2024-02-06 RX ORDER — ACETAMINOPHEN 500 MG
650 TABLET ORAL EVERY 6 HOURS
Refills: 0 | Status: COMPLETED | OUTPATIENT
Start: 2024-02-06 | End: 2025-01-04

## 2024-02-06 RX ORDER — SODIUM CHLORIDE 9 MG/ML
1000 INJECTION, SOLUTION INTRAVENOUS
Refills: 0 | Status: DISCONTINUED | OUTPATIENT
Start: 2024-02-06 | End: 2024-02-06

## 2024-02-06 RX ORDER — VANCOMYCIN HCL 1 G
1000 VIAL (EA) INTRAVENOUS EVERY 12 HOURS
Refills: 0 | Status: DISCONTINUED | OUTPATIENT
Start: 2024-02-06 | End: 2024-02-08

## 2024-02-06 RX ORDER — ENOXAPARIN SODIUM 100 MG/ML
40 INJECTION SUBCUTANEOUS EVERY 24 HOURS
Refills: 0 | Status: DISCONTINUED | OUTPATIENT
Start: 2024-02-07 | End: 2024-02-08

## 2024-02-06 RX ORDER — OXYCODONE HYDROCHLORIDE 5 MG/1
5 TABLET ORAL EVERY 4 HOURS
Refills: 0 | Status: DISCONTINUED | OUTPATIENT
Start: 2024-02-06 | End: 2024-02-06

## 2024-02-06 RX ORDER — ONDANSETRON 8 MG/1
4 TABLET, FILM COATED ORAL ONCE
Refills: 0 | Status: DISCONTINUED | OUTPATIENT
Start: 2024-02-06 | End: 2024-02-06

## 2024-02-06 RX ORDER — DIVALPROEX SODIUM 500 MG/1
1250 TABLET, DELAYED RELEASE ORAL AT BEDTIME
Refills: 0 | Status: DISCONTINUED | OUTPATIENT
Start: 2024-02-06 | End: 2024-02-08

## 2024-02-06 RX ORDER — LAMOTRIGINE 25 MG/1
2 TABLET, ORALLY DISINTEGRATING ORAL
Qty: 0 | Refills: 0 | DISCHARGE

## 2024-02-06 RX ORDER — ACETAMINOPHEN 500 MG
1000 TABLET ORAL EVERY 8 HOURS
Refills: 0 | Status: COMPLETED | OUTPATIENT
Start: 2024-02-06 | End: 2024-02-07

## 2024-02-06 RX ORDER — KETOROLAC TROMETHAMINE 30 MG/ML
15 SYRINGE (ML) INJECTION EVERY 6 HOURS
Refills: 0 | Status: DISCONTINUED | OUTPATIENT
Start: 2024-02-06 | End: 2024-02-08

## 2024-02-06 RX ORDER — CIPROFLOXACIN LACTATE 400MG/40ML
400 VIAL (ML) INTRAVENOUS EVERY 12 HOURS
Refills: 0 | Status: DISCONTINUED | OUTPATIENT
Start: 2024-02-06 | End: 2024-02-08

## 2024-02-06 RX ORDER — TRAMADOL HYDROCHLORIDE 50 MG/1
50 TABLET ORAL EVERY 6 HOURS
Refills: 0 | Status: DISCONTINUED | OUTPATIENT
Start: 2024-02-06 | End: 2024-02-08

## 2024-02-06 RX ORDER — SODIUM CHLORIDE 9 MG/ML
1000 INJECTION, SOLUTION INTRAVENOUS
Refills: 0 | Status: DISCONTINUED | OUTPATIENT
Start: 2024-02-06 | End: 2024-02-07

## 2024-02-06 RX ORDER — TRAMADOL HYDROCHLORIDE 50 MG/1
25 TABLET ORAL EVERY 6 HOURS
Refills: 0 | Status: DISCONTINUED | OUTPATIENT
Start: 2024-02-06 | End: 2024-02-08

## 2024-02-06 RX ORDER — LAMOTRIGINE 25 MG/1
200 TABLET, ORALLY DISINTEGRATING ORAL AT BEDTIME
Refills: 0 | Status: DISCONTINUED | OUTPATIENT
Start: 2024-02-06 | End: 2024-02-08

## 2024-02-06 RX ORDER — ONDANSETRON 8 MG/1
4 TABLET, FILM COATED ORAL EVERY 6 HOURS
Refills: 0 | Status: DISCONTINUED | OUTPATIENT
Start: 2024-02-06 | End: 2024-02-08

## 2024-02-06 RX ORDER — VENLAFAXINE HCL 75 MG
1 CAPSULE, EXT RELEASE 24 HR ORAL
Refills: 0 | DISCHARGE

## 2024-02-06 RX ORDER — HYDROMORPHONE HYDROCHLORIDE 2 MG/ML
0.5 INJECTION INTRAMUSCULAR; INTRAVENOUS; SUBCUTANEOUS
Refills: 0 | Status: DISCONTINUED | OUTPATIENT
Start: 2024-02-06 | End: 2024-02-06

## 2024-02-06 RX ORDER — ENOXAPARIN SODIUM 100 MG/ML
40 INJECTION SUBCUTANEOUS ONCE
Refills: 0 | Status: DISCONTINUED | OUTPATIENT
Start: 2024-02-06 | End: 2024-02-06

## 2024-02-06 RX ADMIN — Medication 15 MILLIGRAM(S): at 23:08

## 2024-02-06 RX ADMIN — Medication 1000 MILLIGRAM(S): at 21:31

## 2024-02-06 RX ADMIN — Medication 250 MILLIGRAM(S): at 05:47

## 2024-02-06 RX ADMIN — HYDROMORPHONE HYDROCHLORIDE 0.5 MILLIGRAM(S): 2 INJECTION INTRAMUSCULAR; INTRAVENOUS; SUBCUTANEOUS at 16:30

## 2024-02-06 RX ADMIN — HYDROMORPHONE HYDROCHLORIDE 0.5 MILLIGRAM(S): 2 INJECTION INTRAMUSCULAR; INTRAVENOUS; SUBCUTANEOUS at 17:17

## 2024-02-06 RX ADMIN — SODIUM CHLORIDE 50 MILLILITER(S): 9 INJECTION, SOLUTION INTRAVENOUS at 05:50

## 2024-02-06 RX ADMIN — HYDROMORPHONE HYDROCHLORIDE 0.5 MILLIGRAM(S): 2 INJECTION INTRAMUSCULAR; INTRAVENOUS; SUBCUTANEOUS at 17:30

## 2024-02-06 RX ADMIN — DIVALPROEX SODIUM 1250 MILLIGRAM(S): 500 TABLET, DELAYED RELEASE ORAL at 21:12

## 2024-02-06 RX ADMIN — LAMOTRIGINE 200 MILLIGRAM(S): 25 TABLET, ORALLY DISINTEGRATING ORAL at 21:12

## 2024-02-06 RX ADMIN — Medication 400 MILLIGRAM(S): at 21:16

## 2024-02-06 RX ADMIN — HYDROMORPHONE HYDROCHLORIDE 0.5 MILLIGRAM(S): 2 INJECTION INTRAMUSCULAR; INTRAVENOUS; SUBCUTANEOUS at 16:15

## 2024-02-06 RX ADMIN — Medication 15 MILLIGRAM(S): at 23:23

## 2024-02-06 NOTE — CONSULT NOTE ADULT - SUBJECTIVE AND OBJECTIVE BOX
HPI:    53y Female PMH of Breast CA, now POD#0 from b/l Mastectomy, with HÉCTOR/free flap placement, as well as Inside Social doppler tissue/flap oxygenation monitoring.     Current Inside Social Doppler:    Right Breast: Strong pulse  Left Breast: Strong pulse       Interval Hx:      PAST MEDICAL & SURGICAL HISTORY:  Breast cancer  Left s/p Bilateral Mastectomy  ; tx Tamoxifen x 8 years      Migraine headache      Depression  Bipolar      Anxiety  1/14/22 ; IN PST ; pt denies h/o anxiety      Uterine polyp      Endometrial thickening on ultrasound      2019 novel coronavirus disease (COVID-19)  Feb 2021 , did not need to be hospitalized.      YOLIS on CPAP      Breast cancer, left      H/O bilateral mastectomy  2012 saline implants      History of D&C      H/O breast reconstruction  12/2023 replaced implants          MEDICATIONS  (STANDING):  acetaminophen     Tablet .. 650 milliGRAM(s) Oral every 6 hours  acetaminophen   IVPB .. 1000 milliGRAM(s) IV Intermittent every 8 hours  ciprofloxacin   IVPB 400 milliGRAM(s) IV Intermittent every 12 hours  divalproex DR 1250 milliGRAM(s) Oral at bedtime  ketorolac   Injectable 15 milliGRAM(s) IV Push every 6 hours  lactated ringers. 1000 milliLiter(s) (75 mL/Hr) IV Continuous <Continuous>  lamoTRIgine 200 milliGRAM(s) Oral at bedtime  vancomycin  IVPB 1000 milliGRAM(s) IV Intermittent every 12 hours  venlafaxine 75 milliGRAM(s) Oral daily    MEDICATIONS  (PRN):  ondansetron Injectable 4 milliGRAM(s) IV Push every 6 hours PRN Nausea and/or Vomiting  traMADol 50 milliGRAM(s) Oral every 6 hours PRN Severe Pain (7 - 10)  traMADol 25 milliGRAM(s) Oral every 6 hours PRN Moderate Pain (4 - 6)      Review of Systems:  Unable to assess secondary to lethargy.       ICU Vital Signs Last 24 Hrs  T(C): 37.2 (06 Feb 2024 20:00), Max: 37.6 (06 Feb 2024 05:03)  T(F): 99 (06 Feb 2024 20:00), Max: 99.7 (06 Feb 2024 05:03)  HR: 70 (06 Feb 2024 20:00) (66 - 90)  BP: 100/45 (06 Feb 2024 20:00) (90/63 - 111/60)  BP(mean): --  ABP: --  ABP(mean): --  RR: 15 (06 Feb 2024 20:00) (12 - 18)  SpO2: 100% (06 Feb 2024 20:00) (99% - 100%)    O2 Parameters below as of 06 Feb 2024 20:00  Patient On (Oxygen Delivery Method): nasal cannula  O2 Flow (L/min): 3                            Physical Examination:    General: No acute distress.  Alert, interactive, lethargic.      BREAST: breasts appears symmetrical, no signs of hematoma, soft to palpation, non tender, well perfused, turgor maintained,cap refill adequate. Devise in place functioning well. ANASTACIO drains in place with serosanguinous drainage.     HEENT: Pupils equal, reactive to light.  Symmetric.    PULM: Clear to auscultation bilaterally, no significant sputum production    CVS: Regular rate and rhythm, no murmurs, rubs, or gallops    ABD: Soft, nondistended, nontender, normoactive bowel sounds, no masses. Flap removal site well approximated, staples in palce. No ative drainage, bleeding, or signs of infection.    EXT: No edema, nontender    SKIN: Warm and well perfused.       Time spent on this patient encounter, which includes documenting this note in the electronic medical record, was 51 minutes including assessing the presenting problems with associated risks, reviewing the medical record to prepare for the encounter, and meeting face to face with patient to obtain additional history. I have also performed an appropriate physical exam, made interventions listed and ordered and interpreted appropriate diagnostic studies as documented. To improve communication and patient saftey, I have coordinated care with the multidisciplinary team including the bedside nurse, appropriate attending of record and consultants as needed.  HPI:    53y Female PMH of Breast CA, now POD#0 from b/l Mastectomy, with HÉCTOR/free flap placement, as well as Tripda doppler tissue/flap oxygenation monitoring.     Current Tripda Doppler:    Right Breast: Strong pulse  Left Breast: Strong pulse       Interval Hx:      PAST MEDICAL & SURGICAL HISTORY:  Breast cancer  Left s/p Bilateral Mastectomy  ; tx Tamoxifen x 8 years      Migraine headache      Depression  Bipolar      Anxiety  1/14/22 ; IN PST ; pt denies h/o anxiety      Uterine polyp      Endometrial thickening on ultrasound      2019 novel coronavirus disease (COVID-19)  Feb 2021 , did not need to be hospitalized.      YOLIS on CPAP      Breast cancer, left      H/O bilateral mastectomy  2012 saline implants      History of D&C      H/O breast reconstruction  12/2023 replaced implants          MEDICATIONS  (STANDING):  acetaminophen     Tablet .. 650 milliGRAM(s) Oral every 6 hours  acetaminophen   IVPB .. 1000 milliGRAM(s) IV Intermittent every 8 hours  ciprofloxacin   IVPB 400 milliGRAM(s) IV Intermittent every 12 hours  divalproex DR 1250 milliGRAM(s) Oral at bedtime  ketorolac   Injectable 15 milliGRAM(s) IV Push every 6 hours  lactated ringers. 1000 milliLiter(s) (75 mL/Hr) IV Continuous <Continuous>  lamoTRIgine 200 milliGRAM(s) Oral at bedtime  vancomycin  IVPB 1000 milliGRAM(s) IV Intermittent every 12 hours  venlafaxine 75 milliGRAM(s) Oral daily    MEDICATIONS  (PRN):  ondansetron Injectable 4 milliGRAM(s) IV Push every 6 hours PRN Nausea and/or Vomiting  traMADol 50 milliGRAM(s) Oral every 6 hours PRN Severe Pain (7 - 10)  traMADol 25 milliGRAM(s) Oral every 6 hours PRN Moderate Pain (4 - 6)      Review of Systems:  Unable to assess secondary to lethargy.       ICU Vital Signs Last 24 Hrs  T(C): 37.2 (06 Feb 2024 20:00), Max: 37.6 (06 Feb 2024 05:03)  T(F): 99 (06 Feb 2024 20:00), Max: 99.7 (06 Feb 2024 05:03)  HR: 70 (06 Feb 2024 20:00) (66 - 90)  BP: 100/45 (06 Feb 2024 20:00) (90/63 - 111/60)  BP(mean): --  ABP: --  ABP(mean): --  RR: 15 (06 Feb 2024 20:00) (12 - 18)  SpO2: 100% (06 Feb 2024 20:00) (99% - 100%)    O2 Parameters below as of 06 Feb 2024 20:00  Patient On (Oxygen Delivery Method): nasal cannula  O2 Flow (L/min): 3                            Physical Examination:    General: No acute distress.  Alert, interactive, lethargic.      BREAST: breasts appears symmetrical, no signs of hematoma, soft to palpation, non tender, well perfused, turgor maintained,cap refill adequate. Devise in place functioning well. ANASTACIO drains in place with serosanguinous drainage.     HEENT: Pupils equal, reactive to light.  Symmetric.    PULM: Clear to auscultation bilaterally, no significant sputum production    CVS: Regular rate and rhythm, no murmurs, rubs, or gallops    ABD: Soft, nondistended, nontender, normoactive bowel sounds, no masses. Flap removal site well approximated, staples in palce. No ative drainage, bleeding, or signs of infection.    EXT: No edema, nontender    SKIN: Warm and well perfused.       Time spent on this patient encounter, which includes documenting this note in the electronic medical record, was 77 minutes including assessing the presenting problems with associated risks, reviewing the medical record to prepare for the encounter, and meeting face to face with patient to obtain additional history. I have also performed an appropriate physical exam, made interventions listed and ordered and interpreted appropriate diagnostic studies as documented. To improve communication and patient saftey, I have coordinated care with the multidisciplinary team including the bedside nurse, appropriate attending of record and consultants as needed.

## 2024-02-06 NOTE — CONSULT NOTE ADULT - ASSESSMENT
53y Female PMH of Breast CA, now POD#0 from b/l Mastectomy, with HÉCTOR/free flap placement, as well as Cook doppler tissue/flap oxygenation monitoring.     1. HÉCTOR s/p b/l mastectomy    Plan:   - Will monitor in surgical stepdown overnight   - Strong doppler pulses b/l, will continue to monitor overnight.   - site w/o signs of bleeding, or extreme tenderness.   - Pain control w/ tramadol secondary to pt w/ intolerance to oxy (itching).   - Cipro and vanco post op for prophylactic abx.    - Tordol and tylenol for mild to moderate pain.   - Diet: regular.   - Case discussed w/ Dr. Mildred Radford and surgical team.

## 2024-02-06 NOTE — BRIEF OPERATIVE NOTE - OPERATION/FINDINGS
Delayed bilateral breast reconstruction with deep inferior epigastric artery  flaps following removal of bilateral breast implants, capsulotomies, and partial capsulectomies.  Bilateral TAP blocks.

## 2024-02-06 NOTE — BRIEF OPERATIVE NOTE - SPECIMENS
left and right internal mammary lymph nodes, left and right breast implants, left and right breast capsules, right breast culture

## 2024-02-07 LAB
ANION GAP SERPL CALC-SCNC: 6 MMOL/L — SIGNIFICANT CHANGE UP (ref 5–17)
BUN SERPL-MCNC: 7 MG/DL — SIGNIFICANT CHANGE UP (ref 7–23)
CALCIUM SERPL-MCNC: 8.5 MG/DL — SIGNIFICANT CHANGE UP (ref 8.4–10.5)
CHLORIDE SERPL-SCNC: 106 MMOL/L — SIGNIFICANT CHANGE UP (ref 96–108)
CO2 SERPL-SCNC: 31 MMOL/L — SIGNIFICANT CHANGE UP (ref 22–31)
CREAT SERPL-MCNC: 0.69 MG/DL — SIGNIFICANT CHANGE UP (ref 0.5–1.3)
EGFR: 104 ML/MIN/1.73M2 — SIGNIFICANT CHANGE UP
GLUCOSE SERPL-MCNC: 120 MG/DL — HIGH (ref 70–99)
HCT VFR BLD CALC: 31.9 % — LOW (ref 34.5–45)
HGB BLD-MCNC: 10.9 G/DL — LOW (ref 11.5–15.5)
MCHC RBC-ENTMCNC: 31.9 PG — SIGNIFICANT CHANGE UP (ref 27–34)
MCHC RBC-ENTMCNC: 34.2 GM/DL — SIGNIFICANT CHANGE UP (ref 32–36)
MCV RBC AUTO: 93.3 FL — SIGNIFICANT CHANGE UP (ref 80–100)
NRBC # BLD: 0 /100 WBCS — SIGNIFICANT CHANGE UP (ref 0–0)
PLATELET # BLD AUTO: 180 K/UL — SIGNIFICANT CHANGE UP (ref 150–400)
POTASSIUM SERPL-MCNC: 3.9 MMOL/L — SIGNIFICANT CHANGE UP (ref 3.5–5.3)
POTASSIUM SERPL-SCNC: 3.9 MMOL/L — SIGNIFICANT CHANGE UP (ref 3.5–5.3)
RBC # BLD: 3.42 M/UL — LOW (ref 3.8–5.2)
RBC # FLD: 12.8 % — SIGNIFICANT CHANGE UP (ref 10.3–14.5)
SODIUM SERPL-SCNC: 143 MMOL/L — SIGNIFICANT CHANGE UP (ref 135–145)
WBC # BLD: 9.66 K/UL — SIGNIFICANT CHANGE UP (ref 3.8–10.5)
WBC # FLD AUTO: 9.66 K/UL — SIGNIFICANT CHANGE UP (ref 3.8–10.5)

## 2024-02-07 RX ORDER — SODIUM CHLORIDE 9 MG/ML
1000 INJECTION, SOLUTION INTRAVENOUS
Refills: 0 | Status: DISCONTINUED | OUTPATIENT
Start: 2024-02-07 | End: 2024-02-08

## 2024-02-07 RX ORDER — LANOLIN ALCOHOL/MO/W.PET/CERES
6 CREAM (GRAM) TOPICAL ONCE
Refills: 0 | Status: COMPLETED | OUTPATIENT
Start: 2024-02-07 | End: 2024-02-07

## 2024-02-07 RX ORDER — METOCLOPRAMIDE HCL 10 MG
5 TABLET ORAL ONCE
Refills: 0 | Status: COMPLETED | OUTPATIENT
Start: 2024-02-07 | End: 2024-02-07

## 2024-02-07 RX ADMIN — Medication 400 MILLIGRAM(S): at 14:33

## 2024-02-07 RX ADMIN — Medication 5 MILLIGRAM(S): at 07:59

## 2024-02-07 RX ADMIN — ONDANSETRON 4 MILLIGRAM(S): 8 TABLET, FILM COATED ORAL at 06:18

## 2024-02-07 RX ADMIN — Medication 400 MILLIGRAM(S): at 05:54

## 2024-02-07 RX ADMIN — Medication 15 MILLIGRAM(S): at 19:29

## 2024-02-07 RX ADMIN — Medication 6 MILLIGRAM(S): at 21:15

## 2024-02-07 RX ADMIN — Medication 15 MILLIGRAM(S): at 12:03

## 2024-02-07 RX ADMIN — SODIUM CHLORIDE 100 MILLILITER(S): 9 INJECTION, SOLUTION INTRAVENOUS at 21:17

## 2024-02-07 RX ADMIN — SODIUM CHLORIDE 100 MILLILITER(S): 9 INJECTION, SOLUTION INTRAVENOUS at 13:49

## 2024-02-07 RX ADMIN — Medication 1000 MILLIGRAM(S): at 15:33

## 2024-02-07 RX ADMIN — Medication 1000 MILLIGRAM(S): at 21:31

## 2024-02-07 RX ADMIN — Medication 15 MILLIGRAM(S): at 13:28

## 2024-02-07 RX ADMIN — ENOXAPARIN SODIUM 40 MILLIGRAM(S): 100 INJECTION SUBCUTANEOUS at 11:00

## 2024-02-07 RX ADMIN — Medication 400 MILLIGRAM(S): at 21:16

## 2024-02-07 RX ADMIN — DIVALPROEX SODIUM 1250 MILLIGRAM(S): 500 TABLET, DELAYED RELEASE ORAL at 21:14

## 2024-02-07 RX ADMIN — Medication 250 MILLIGRAM(S): at 05:53

## 2024-02-07 RX ADMIN — Medication 15 MILLIGRAM(S): at 14:22

## 2024-02-07 RX ADMIN — LAMOTRIGINE 200 MILLIGRAM(S): 25 TABLET, ORALLY DISINTEGRATING ORAL at 21:14

## 2024-02-07 RX ADMIN — Medication 75 MILLIGRAM(S): at 07:02

## 2024-02-07 RX ADMIN — Medication 15 MILLIGRAM(S): at 19:44

## 2024-02-07 RX ADMIN — Medication 200 MILLIGRAM(S): at 16:32

## 2024-02-07 RX ADMIN — TRAMADOL HYDROCHLORIDE 50 MILLIGRAM(S): 50 TABLET ORAL at 02:03

## 2024-02-07 RX ADMIN — Medication 250 MILLIGRAM(S): at 17:53

## 2024-02-07 RX ADMIN — Medication 1000 MILLIGRAM(S): at 06:09

## 2024-02-07 RX ADMIN — Medication 15 MILLIGRAM(S): at 11:03

## 2024-02-07 RX ADMIN — SODIUM CHLORIDE 75 MILLILITER(S): 9 INJECTION, SOLUTION INTRAVENOUS at 05:54

## 2024-02-07 RX ADMIN — Medication 200 MILLIGRAM(S): at 04:50

## 2024-02-07 RX ADMIN — TRAMADOL HYDROCHLORIDE 50 MILLIGRAM(S): 50 TABLET ORAL at 01:03

## 2024-02-07 NOTE — PROGRESS NOTE ADULT - SUBJECTIVE AND OBJECTIVE BOX
patient c/o nausea  she denies cp, sob, fevers, calf pain  on exam, both breasts soft. no mastectomy ischemia.  HÉCOTR flap viable. good color and cap refill of skin paddle. audible Cook doppler signal.  abdomen soft. incisions intact. umbiliucs viable.  drain output serosang

## 2024-02-07 NOTE — PROGRESS NOTE ADULT - NS ATTEND AMEND GEN_ALL_CORE FT
Pt seen and examined  doing well post op  nausea resolved when seen  no cp, sob. palp  restarted on home meds      wounds evaluated, both breasts and abd incisions intact, no dehiscence, good color, cook doppler in place  transition care from ICU to surgical team  post op wound care/dispo planning as per team.     patient states she HAS NOT used CPAP for many years despite above documentation - and does not want to start cpap here

## 2024-02-07 NOTE — PROGRESS NOTE ADULT - SUBJECTIVE AND OBJECTIVE BOX
Rested well overnight, awoke this morning nauseous and vomited twice.  Has remained on bedrest, started clear diet.  Lloyd removed, due to void.  No chest pain, lightheadedness, dizziness, abdominal pain.    Vital Signs Last 24 Hrs  T(C): 37 (07 Feb 2024 06:30), Max: 37.2 (06 Feb 2024 20:00)  T(F): 98.6 (07 Feb 2024 06:30), Max: 99 (06 Feb 2024 20:00)  HR: 73 (07 Feb 2024 06:30) (68 - 90)  BP: 95/53 (07 Feb 2024 06:30) (90/63 - 111/60)  RR: 15 (07 Feb 2024 06:30) (12 - 18)  SpO2: 100% RA  (07 Feb 2024 06:30) (99% - 100%)    Labs                        10.9   9.66  )-----------( 180      ( 07 Feb 2024 05:59 )             31.9     Current Medications  acetaminophen     Tablet .. 650 milliGRAM(s) Oral every 6 hours  acetaminophen   IVPB .. 1000 milliGRAM(s) IV Intermittent every 8 hours  ciprofloxacin   IVPB 400 milliGRAM(s) IV Intermittent every 12 hours  divalproex DR 1250 milliGRAM(s) Oral at bedtime  enoxaparin Injectable 40 milliGRAM(s) SubCutaneous every 24 hours  ketorolac   Injectable 15 milliGRAM(s) IV Push every 6 hours  lactated ringers. 1000 milliLiter(s) (75 mL/Hr) IV Continuous <Continuous>  lamoTRIgine 200 milliGRAM(s) Oral at bedtime  metoclopramide Injectable 5 milliGRAM(s) IV Push once  vancomycin  IVPB 1000 milliGRAM(s) IV Intermittent every 12 hours  venlafaxine 75 milliGRAM(s) Oral daily  ondansetron Injectable 4 milliGRAM(s) IV Push every 6 hours PRN Nausea and/or Vomiting  traMADol 25 milliGRAM(s) Oral every 6 hours PRN Moderate Pain (4 - 6)  traMADol 50 milliGRAM(s) Oral every 6 hours PRN Severe Pain (7 - 10)    Physical Exam  Gen:  WN/WD Female resting in bed, NAD  ENT:  NC/AT, no JVD noted  Thorax:  Symmetric, no retractions.  Surgical sites soft, dark ecchymosis noted below flap right side.  Surgical incisions C/D/I  Lung:  CTA b/l  CV:  S1, S2. RRR  Abd:  Soft, NT/ND.  BS normoactive, no masses to palp.  Surgical site CHU, C/D/I  Extrem:  No C/C/E, DP/radial pulses +2  Neuro:  No gross motor/sensory deficits  Psych:  Alert and oriented x3, calm and cooperative  HPI:  52 yo F with h/o depression, bipolar disorder, YOLIS on CPAP, left breast cancer  (was on Tamoxifen till 2021) s/p bilateral mastectomy and reconstruction in 2013  with implant exchange in December 2023, with complications of right breast cellulitis requiring hospitalization early Jan 2024 with IV antibiotics complete with oral antibiotics at home but reports still having issues with right breast implant. patient underwent delayed bilateral microsurgical breast reconstruction with DEEP inferior epigastric artery  flaps, removal of bilateral breast implants with Dr Levy on 02/06/2024.        Today    Rested well overnight, awoke this morning nauseous and vomited twice.  Has remained on bedrest, started clear diet.  Lloyd removed, due to void.  No chest pain, lightheadedness, dizziness, abdominal pain.    PAST MEDICAL & SURGICAL HISTORY:  Breast cancer Left s/p Bilateral Mastectomy  ; tx Tamoxifen x 8 years  Migraine headache  Depression Bipolar  Anxiety 1/14/22 ; IN PST ; pt denies h/o anxiety  Uterine polyp  Endometrial thickening on ultrasound  2019 novel coronavirus disease (COVID-19) Feb 2021 , did not need to be hospitalized.  YOLIS on CPAP  Breast cancer, left  H/O bilateral mastectomy 2012 saline implants  History of D&C   H/O breast reconstruction 12/2023 replaced implants        FAMILY HISTORY:  FH: leukemia (Father)    Family history of hypertension in mother (Mother)      Social History: Denies smoking, drinking, drug use    Home Medications:  Depakote 250 mg oral delayed release tablet: 5 tab(s) orally once a day (at bedtime) (06 Feb 2024 05:48)  Effexor 75 mg oral tablet: 1 tab(s) orally once a day (06 Feb 2024 05:48)  LaMICtal 100 mg oral tablet: 2 tab(s) orally once a day (at bedtime) (06 Feb 2024 05:48)    Allergies    latex (Unknown)  bacitracin (Rash)  cefdinir (Rash)    Intolerances          Vital Signs Last 24 Hrs  T(C): 37 (07 Feb 2024 06:30), Max: 37.2 (06 Feb 2024 20:00)  T(F): 98.6 (07 Feb 2024 06:30), Max: 99 (06 Feb 2024 20:00)  HR: 73 (07 Feb 2024 06:30) (68 - 90)  BP: 95/53 (07 Feb 2024 06:30) (90/63 - 111/60)  RR: 15 (07 Feb 2024 06:30) (12 - 18)  SpO2: 100% RA  (07 Feb 2024 06:30) (99% - 100%)    Labs                        10.9   9.66  )-----------( 180      ( 07 Feb 2024 05:59 )             31.9     Current Medications  acetaminophen     Tablet .. 650 milliGRAM(s) Oral every 6 hours  acetaminophen   IVPB .. 1000 milliGRAM(s) IV Intermittent every 8 hours  ciprofloxacin   IVPB 400 milliGRAM(s) IV Intermittent every 12 hours  divalproex DR 1250 milliGRAM(s) Oral at bedtime  enoxaparin Injectable 40 milliGRAM(s) SubCutaneous every 24 hours  ketorolac   Injectable 15 milliGRAM(s) IV Push every 6 hours  lactated ringers. 1000 milliLiter(s) (75 mL/Hr) IV Continuous <Continuous>  lamoTRIgine 200 milliGRAM(s) Oral at bedtime  metoclopramide Injectable 5 milliGRAM(s) IV Push once  vancomycin  IVPB 1000 milliGRAM(s) IV Intermittent every 12 hours  venlafaxine 75 milliGRAM(s) Oral daily  ondansetron Injectable 4 milliGRAM(s) IV Push every 6 hours PRN Nausea and/or Vomiting  traMADol 25 milliGRAM(s) Oral every 6 hours PRN Moderate Pain (4 - 6)  traMADol 50 milliGRAM(s) Oral every 6 hours PRN Severe Pain (7 - 10)    Physical Exam  Gen:  WN/WD Female resting in bed, NAD  ENT:  NC/AT, no JVD noted  Thorax:  Symmetric, no retractions.  Surgical sites soft, dark ecchymosis noted below flap right side.  Surgical incisions C/D/I  Lung:  CTA b/l  CV:  S1, S2. RRR  Abd:  Soft, NT/ND.  BS normoactive, no masses to palp.  Surgical site CHU, C/D/I  Extrem:  No C/C/E, DP/radial pulses +2  Neuro:  No gross motor/sensory deficits  Psych:  Alert and oriented x3, calm and cooperative

## 2024-02-07 NOTE — PROGRESS NOTE ADULT - SUBJECTIVE AND OBJECTIVE BOX
HPI:    53y Female PMH of Breast CA, now POD#_1____ from b/l Mastectomy, with HÉCTOR/free flap placement, as well as COOK doppler placement b/l    -strong doppler signal noted to b/l breasts            PAST MEDICAL & SURGICAL HISTORY:  Breast cancer  Left s/p Bilateral Mastectomy  ; tx Tamoxifen x 8 years      Migraine headache      Depression  Bipolar      Anxiety  1/14/22 ; IN PST ; pt denies h/o anxiety      Uterine polyp      Endometrial thickening on ultrasound      2019 novel coronavirus disease (COVID-19)  Feb 2021 , did not need to be hospitalized.      YOLIS on CPAP      Breast cancer, left      H/O bilateral mastectomy  2012 saline implants      History of D&C      H/O breast reconstruction  12/2023 replaced implants          MEDICATIONS  (STANDING):  acetaminophen     Tablet .. 650 milliGRAM(s) Oral every 6 hours  acetaminophen   IVPB .. 1000 milliGRAM(s) IV Intermittent every 8 hours  ciprofloxacin   IVPB 400 milliGRAM(s) IV Intermittent every 12 hours  divalproex DR 1250 milliGRAM(s) Oral at bedtime  enoxaparin Injectable 40 milliGRAM(s) SubCutaneous every 24 hours  ketorolac   Injectable 15 milliGRAM(s) IV Push every 6 hours  lactated ringers. 1000 milliLiter(s) (100 mL/Hr) IV Continuous <Continuous>  lamoTRIgine 200 milliGRAM(s) Oral at bedtime  vancomycin  IVPB 1000 milliGRAM(s) IV Intermittent every 12 hours  venlafaxine 75 milliGRAM(s) Oral daily    MEDICATIONS  (PRN):  ondansetron Injectable 4 milliGRAM(s) IV Push every 6 hours PRN Nausea and/or Vomiting  traMADol 50 milliGRAM(s) Oral every 6 hours PRN Severe Pain (7 - 10)  traMADol 25 milliGRAM(s) Oral every 6 hours PRN Moderate Pain (4 - 6)      Review of Systems:  CONSTITUTIONAL: No fever, chills, or fatigue  EYES: No eye pain, visual disturbances, or discharge  ENMT:  No difficulty hearing, tinnitus, vertigo; No sinus or throat pain  NECK: No pain or stiffness  RESPIRATORY: No cough, wheezing, chills or hemoptysis; No shortness of breath  CARDIOVASCULAR: No chest pain, palpitations, dizziness, or leg swelling  GASTROINTESTINAL: No abdominal or epigastric pain. No nausea, vomiting, or hematemesis; No diarrhea or constipation. No melena or hematochezia.  GENITOURINARY: No dysuria, frequency, hematuria, or incontinence  NEUROLOGICAL: No headaches, memory loss, loss of strength, numbness, or tremors  SKIN: No itching, burning, rashes, or lesions   MUSCULOSKELETAL: No joint pain or swelling; No muscle, back, or extremity pain  PSYCHIATRIC: No depression, anxiety, mood swings, or difficulty sleeping      ICU Vital Signs Last 24 Hrs  T(C): 36.9 (07 Feb 2024 18:52), Max: 37.2 (06 Feb 2024 20:00)  T(F): 98.5 (07 Feb 2024 18:52), Max: 99 (06 Feb 2024 20:00)  HR: 83 (07 Feb 2024 18:52) (68 - 83)  BP: 117/3 (07 Feb 2024 18:52) (88/59 - 117/3)  BP(mean): --  ABP: --  ABP(mean): --  RR: 14 (07 Feb 2024 18:52) (12 - 15)  SpO2: 100% (07 Feb 2024 18:52) (100% - 100%)    O2 Parameters below as of 07 Feb 2024 18:52  Patient On (Oxygen Delivery Method): room air                                    10.9   9.66  )-----------( 180      ( 07 Feb 2024 05:59 )             31.9       02-07    143  |  106  |  7   ----------------------------<  120<H>  3.9   |  31  |  0.69    Ca    8.5      07 Feb 2024 05:59            Physical Examination:    General: No acute distress.  Alert, oriented, interactive, nonfocal    BREAST: breasts appears symmetrical, no signs of hematoma, soft to palpation, non tender, well perfused, turgor maintained,cap refill adequate.  ANASTACIO drains in place with serosanguinous drainage.     HEENT: Pupils equal, reactive to light.  Symmetric.    PULM: Clear to auscultation bilaterally, no significant sputum production    CVS: Regular rate and rhythm, no murmurs, rubs, or gallops    ABD: Soft, nondistended, nontender, normoactive bowel sounds, no masses. Flap removal site well approximated, staples in palce. No ative drainage, bleeding, or signs of infection.    EXT: No edema, nontender    SKIN: Warm and well perfused, no rashes noted.       abd pain, n/v

## 2024-02-08 ENCOUNTER — TRANSCRIPTION ENCOUNTER (OUTPATIENT)
Age: 54
End: 2024-02-08

## 2024-02-08 VITALS
SYSTOLIC BLOOD PRESSURE: 96 MMHG | RESPIRATION RATE: 15 BRPM | OXYGEN SATURATION: 97 % | HEART RATE: 72 BPM | TEMPERATURE: 98 F | DIASTOLIC BLOOD PRESSURE: 53 MMHG

## 2024-02-08 LAB — SURGICAL PATHOLOGY STUDY: SIGNIFICANT CHANGE UP

## 2024-02-08 PROCEDURE — 36415 COLL VENOUS BLD VENIPUNCTURE: CPT

## 2024-02-08 PROCEDURE — C1889: CPT

## 2024-02-08 PROCEDURE — 88304 TISSUE EXAM BY PATHOLOGIST: CPT

## 2024-02-08 PROCEDURE — 87070 CULTURE OTHR SPECIMN AEROBIC: CPT

## 2024-02-08 PROCEDURE — C1781: CPT

## 2024-02-08 PROCEDURE — 85027 COMPLETE CBC AUTOMATED: CPT

## 2024-02-08 PROCEDURE — 88300 SURGICAL PATH GROSS: CPT

## 2024-02-08 PROCEDURE — 88305 TISSUE EXAM BY PATHOLOGIST: CPT

## 2024-02-08 PROCEDURE — 87102 FUNGUS ISOLATION CULTURE: CPT

## 2024-02-08 PROCEDURE — C9399: CPT

## 2024-02-08 PROCEDURE — 80048 BASIC METABOLIC PNL TOTAL CA: CPT

## 2024-02-08 RX ORDER — ACETAMINOPHEN 500 MG
650 TABLET ORAL EVERY 6 HOURS
Refills: 0 | Status: DISCONTINUED | OUTPATIENT
Start: 2024-02-08 | End: 2024-02-08

## 2024-02-08 RX ADMIN — Medication 75 MILLIGRAM(S): at 09:04

## 2024-02-08 RX ADMIN — Medication 15 MILLIGRAM(S): at 04:16

## 2024-02-08 RX ADMIN — Medication 650 MILLIGRAM(S): at 11:25

## 2024-02-08 RX ADMIN — Medication 15 MILLIGRAM(S): at 04:01

## 2024-02-08 RX ADMIN — ENOXAPARIN SODIUM 40 MILLIGRAM(S): 100 INJECTION SUBCUTANEOUS at 11:55

## 2024-02-08 RX ADMIN — Medication 15 MILLIGRAM(S): at 10:25

## 2024-02-08 RX ADMIN — Medication 15 MILLIGRAM(S): at 11:30

## 2024-02-08 RX ADMIN — Medication 250 MILLIGRAM(S): at 06:49

## 2024-02-08 RX ADMIN — Medication 650 MILLIGRAM(S): at 11:55

## 2024-02-08 RX ADMIN — TRAMADOL HYDROCHLORIDE 50 MILLIGRAM(S): 50 TABLET ORAL at 09:30

## 2024-02-08 RX ADMIN — TRAMADOL HYDROCHLORIDE 50 MILLIGRAM(S): 50 TABLET ORAL at 08:36

## 2024-02-08 RX ADMIN — Medication 200 MILLIGRAM(S): at 04:01

## 2024-02-08 NOTE — DISCHARGE NOTE PROVIDER - NSDCFUSCHEDAPPT_GEN_ALL_CORE_FT
Prabhakar Levy Physician [facility]  [facility] [address] [place] [address]  Scheduled Appointment: 02/12/2024

## 2024-02-08 NOTE — DISCHARGE NOTE NURSING/CASE MANAGEMENT/SOCIAL WORK - PATIENT PORTAL LINK FT
inadequate nutrition, untreated arterial and venous disease if applicable and measures to manage edema. PAST MEDICAL HISTORY        Diagnosis Date    AVD (aortic valve disease)     CAD (coronary artery disease)     Cardiomyopathy (HCC)     CHF (congestive heart failure) (HCC)     Diabetes mellitus ([facility]     Hyperlipidemia     Hypertension     MI (myocardial infarction) ([facility]     Neuropathy     Paroxysmal A-fib ([facility]     Fidelina Stafford referral faxed to [facility] You can access the FollowMyHealth Patient Portal offered by [facility] by registering at the following website: [email or web address]. By joining [facility] FollowMyHealth portal, you will also be able to view your health information using other applications (apps) compatible with our system.

## 2024-02-08 NOTE — DISCHARGE NOTE PROVIDER - NSDCFUADDINST_GEN_ALL_CORE_FT
Regular Diet - No restrictions  Follow Instructions Provided by your Surgical Team  Activity:  - Rest at home during the first few days after surgery.  - Walking is encouraged, but strenuous exercise is not allowed until 6 weeks after surgery.   - Avoid strenuous activity. Do not lift your arms above your head. Do not lift more than 5-10  pounds.    Sleep:  Sleep on your back for the first two weeks after surgery.    Showering:  - You may take sponge baths following discharge. Pat dry. We will instruct you to shower once you  have drains removed from your breasts in the office.  - Do not take a bath or submerge yourself in water.  - You will have special adhesive glue or tape over the incisions. Do not take these off.   For the Breast:  You have just undergone a breast reconstruction with the HÉCTOR flap. Your breast will likely have bruising  and possibly some blistering on the skin, which is expected after a mastectomy. You have a small patch  of skin on your breasts, which is a different color than the surrounding breast skin. This paddle of skin  comes from the abdomen and is an indicator of how the flap is doing. It is important to check this skin  paddle daily. The skin should remain the same color. If the color of the small patch changes (i.e blue,  purple, pale), please call the office immediately    For the Abdomen:  Your incision and belly button are covered in a special medical grade sealant, which will come off in the  office, 2-3 weeks after surgery.    Drains:  Both the breast and abdomen will have drains. It is important to empty the drains twice daily and  record the outputs. Please bring this sheet to your appointment after surgery. Based on the output, the  drains will be removed 1 to 3 weeks after surgery. For the drains to be working appropriately, the bulbs  need to be collapsed to create a light suction. The nurse in the hospital will review the drain care with  you and your family prior to discharge home. It is best to safely secure the drains to your clothes with a  safety pin.       Call the Office:  Do not hesitate to call the office with any concerns or questions. A doctor is available to answer your  questions 24 hours a day. Please notify us immediately if:  1. You have increased swelling, pain, or color change in the breast.  2. One breast becomes suddenly significantly larger than the other breast.  3. You have a sudden increase in swelling of the abdomen.  4. You have redness develop around the incisions.  5. You have a fever greater than 101 F.  6. You develop sudden increase in pain.  7. You develop drainage, spreading redness or foul odor  8. You have any questions.

## 2024-02-08 NOTE — PROGRESS NOTE ADULT - ASSESSMENT
s/p bilateral HÉCTOR flap, POD 1  plan:  anti-nausea medication  d/c shelley  OOB to chair  regular diet  encourage water intake  HLIV  cont DVT chemoppx  cont cook doppler
s/p bilateral nipple-sparing mastectomy and HÉCTOR flap breast reconstruction, POD 2  continue DVT chemoppx  encourage additional ambulation and po intake  patient had intractable nausea that only recently resolved - as such her po intake has been limited. this may prolong her discharge. she was explained that hydration and nutrition are key to healing  encourage incentive spirometer  continue IV antibiotics as per outpatient infectious disease  await culture and gram stain
53 year old female with PMH anxiety/depression, bipolar disorder POD #1 delayed bilateral breast reconstruction with deep inferior epigastric artery  flaps following removal of bilateral breast implants, capsulotomies, and partial capsulectomies    Plan  Monitor dopplers, VS and drain outputs  Monitor for voiding  Antiemetics/analgesics standing and PRN  Increase activity and diet as tolerated  Incentive spirometry 10x hour  Restart home meds  Labs and wound care as per surgical team  DVT ppx  
53y Female PMH of Breast CA, now POD#_1____ from b/l Mastectomy, with HÉCTOR/free flap placement, as well as COOK doppler placement b/l    -strong doppler signal noted to b/l breasts      PLAN:        -continuous cook doppler assessment of b/l breasts  -any loss of signal to be escalated to surgical team  -serial flap assessment for signs of perfusion  -skin assessment for color, firmness, signs of hematoma or other changes  -abdominal incisions site  monitoring  -hourly checks on ANASTACIO drain output  -pain control  -Morning LABS  -have discussed case with eICU team, including attending physician  -any and all changes or concerns regarding HÉCTOR procedure, flap, etc will be immedietly addressed with primary surgical team           TIME SPENT:  35 minutes of  time spent providing medical care for patient's acute illness/conditions that impairs at least one vital organ system and/or poses a high risk of imminent or life threatening deterioration in the patient's condition. It includes time spent evaluating and treating the patient's acute illness as well as time spent reviewing labs, radiology, discussing goals of care with patient and/or patient's family, and discussing the case with a multidisciplinary team, including the eICU, in an effort to prevent further life threatening deterioration or end organ damage. This time is independent of any procedures performed.    DATE OF ENTRY OF THIS NOTE IS EQUAL TO THE DATE OF SERVICES RENDERED

## 2024-02-08 NOTE — DISCHARGE NOTE PROVIDER - HOSPITAL COURSE
53F with PMHx of depression, bipolar disorder, YOLIS on CPAP, L breast cancer s/p b/l mastectomy and reconstruction in 2013, implant exchange in December 2023 complicated with R breast cellulitis requiring hospitalization and IV antibiotics presented to [facility] on 02/06 for scheduled procedure. Pt now s/p delayed b/l HÉCTOR reconstruction with Manda Levy and Anselmo. Operative course uneventful, pt recovered in PACU and admitted to surgical floor for further monitoring. Pt's cook dopplers, vitals, and labs were monitored throughout stay. Pt was on DVT prophylaxis and IV antibiotics during hospital admission. POD1 shelley was removed and pt advanced to regular diet. Pt OOB/ambulated. POD2 pt was seen and examined by attending surgeon and determined stable for discharge. Cook dopplers were disconnected. At time of discharge pt was HD stable, tolerating diet, voiding freely, exam stable. Pt understands and agreeable to plan.

## 2024-02-08 NOTE — DISCHARGE NOTE PROVIDER - NSDCMRMEDTOKEN_GEN_ALL_CORE_FT
Depakote 250 mg oral delayed release tablet: 5 tab(s) orally once a day (at bedtime)  Effexor 75 mg oral tablet: 1 tab(s) orally once a day  LaMICtal 100 mg oral tablet: 2 tab(s) orally once a day (at bedtime)

## 2024-02-08 NOTE — DISCHARGE NOTE PROVIDER - CARE PROVIDER_API CALL
Prabhakar Levy  Plastic Surgery  [address] [place] [address], [address]  [place] [address]  Phone: [phone]  Fax: [phone]  Scheduled Appointment: 02/12/2024

## 2024-02-08 NOTE — PROGRESS NOTE ADULT - SUBJECTIVE AND OBJECTIVE BOX
doing well  no cp, no sob, no fevers, no calf pain  on exam, the patient has soft breasts. there is no mastectomy ischemia.   HÉCTOR flap viable with good color and refill. audible cook doppler signal.  abdomen soft. incisions intact. umbilicus viable.  drain output serosang

## 2024-02-08 NOTE — DISCHARGE NOTE PROVIDER - NSDCCPCAREPLAN_GEN_ALL_CORE_FT
PRINCIPAL DISCHARGE DIAGNOSIS  Diagnosis: Deformity of reconstructed breast  Assessment and Plan of Treatment:

## 2024-02-08 NOTE — DISCHARGE NOTE PROVIDER - NSDCCPTREATMENT_GEN_ALL_CORE_FT
PRINCIPAL PROCEDURE  Procedure: Breast reconstruction with HÉCTOR free flap  Findings and Treatment:

## 2024-02-09 DIAGNOSIS — N61.0 MASTITIS WITHOUT ABSCESS: ICD-10-CM

## 2024-02-09 RX ORDER — SULFAMETHOXAZOLE AND TRIMETHOPRIM 800; 160 MG/1; MG/1
800-160 TABLET ORAL TWICE DAILY
Qty: 18 | Refills: 0 | Status: ACTIVE | COMMUNITY
Start: 2024-02-09 | End: 1900-01-01

## 2024-02-09 RX ORDER — LEVOFLOXACIN 750 MG/1
750 TABLET, FILM COATED ORAL DAILY
Qty: 9 | Refills: 1 | Status: ACTIVE | COMMUNITY
Start: 2024-02-09 | End: 1900-01-01

## 2024-02-11 LAB
CULTURE RESULTS: NO GROWTH — SIGNIFICANT CHANGE UP
CULTURE RESULTS: NO GROWTH — SIGNIFICANT CHANGE UP
SPECIMEN SOURCE: SIGNIFICANT CHANGE UP
SPECIMEN SOURCE: SIGNIFICANT CHANGE UP

## 2024-02-12 ENCOUNTER — APPOINTMENT (OUTPATIENT)
Dept: PLASTIC SURGERY | Facility: CLINIC | Age: 54
End: 2024-02-12
Payer: COMMERCIAL

## 2024-02-12 VITALS
BODY MASS INDEX: 24.96 KG/M2 | HEART RATE: 75 BPM | OXYGEN SATURATION: 100 % | WEIGHT: 148 LBS | SYSTOLIC BLOOD PRESSURE: 104 MMHG | RESPIRATION RATE: 16 BRPM | HEIGHT: 64.5 IN | DIASTOLIC BLOOD PRESSURE: 65 MMHG

## 2024-02-12 PROCEDURE — 99024 POSTOP FOLLOW-UP VISIT: CPT

## 2024-02-15 ENCOUNTER — LABORATORY RESULT (OUTPATIENT)
Age: 54
End: 2024-02-15

## 2024-02-15 ENCOUNTER — APPOINTMENT (OUTPATIENT)
Dept: PLASTIC SURGERY | Facility: CLINIC | Age: 54
End: 2024-02-15
Payer: COMMERCIAL

## 2024-02-15 VITALS
DIASTOLIC BLOOD PRESSURE: 80 MMHG | SYSTOLIC BLOOD PRESSURE: 122 MMHG | HEIGHT: 64.5 IN | HEART RATE: 99 BPM | TEMPERATURE: 98 F | BODY MASS INDEX: 24.96 KG/M2 | OXYGEN SATURATION: 100 % | WEIGHT: 148 LBS

## 2024-02-15 PROCEDURE — 99024 POSTOP FOLLOW-UP VISIT: CPT

## 2024-02-16 ENCOUNTER — APPOINTMENT (OUTPATIENT)
Dept: PLASTIC SURGERY | Facility: CLINIC | Age: 54
End: 2024-02-16
Payer: COMMERCIAL

## 2024-02-16 ENCOUNTER — APPOINTMENT (OUTPATIENT)
Dept: INFECTIOUS DISEASE | Facility: CLINIC | Age: 54
End: 2024-02-16
Payer: COMMERCIAL

## 2024-02-16 VITALS
TEMPERATURE: 98 F | HEART RATE: 76 BPM | WEIGHT: 149 LBS | DIASTOLIC BLOOD PRESSURE: 79 MMHG | OXYGEN SATURATION: 98 % | SYSTOLIC BLOOD PRESSURE: 110 MMHG | BODY MASS INDEX: 25.18 KG/M2

## 2024-02-16 VITALS
OXYGEN SATURATION: 100 % | RESPIRATION RATE: 16 BRPM | WEIGHT: 149 LBS | BODY MASS INDEX: 25.13 KG/M2 | DIASTOLIC BLOOD PRESSURE: 69 MMHG | HEART RATE: 80 BPM | SYSTOLIC BLOOD PRESSURE: 105 MMHG | HEIGHT: 64.5 IN

## 2024-02-16 PROCEDURE — 99214 OFFICE O/P EST MOD 30 MIN: CPT

## 2024-02-16 PROCEDURE — 99024 POSTOP FOLLOW-UP VISIT: CPT

## 2024-02-16 NOTE — CONSULT LETTER
[Dear  ___] : Dear  [unfilled], [( Thank you for referring [unfilled] for consultation for _____ )] : Thank you for referring [unfilled] for consultation for [unfilled] [Please see my note below.] : Please see my note below. [FreeTextEntry2] : Dr. Prabhakar Levy [FreeTextEntry3] : Thank you for allowing me to participate in the care of this patient.  Please feel free to contact me with any questions.  Sincerely,  Michael I. Oppenheim, MD

## 2024-02-16 NOTE — PHYSICAL EXAM
[General Appearance - Alert] : alert [General Appearance - In No Acute Distress] : in no acute distress [General Appearance - Well-Appearing] : healthy appearing [Sclera] : the sclera and conjunctiva were normal [Extraocular Movements] : extraocular movements were intact [Outer Ear] : the ears and nose were normal in appearance [Neck Appearance] : the appearance of the neck was normal [Neck Cervical Mass (___cm)] : no neck mass was observed [] : no respiratory distress [Heart Rate And Rhythm] : heart rate was normal and rhythm regular [No Focal Deficits] : no focal deficits [Oriented To Time, Place, And Person] : oriented to person, place, and time [FreeTextEntry1] : Right breast with some purple hue under breast.  Left breast post op.  Abdomen with 2 drains, midline redness but doesn't appear cellulitis.  Not spreading.  No foul odor or purulence noted.

## 2024-02-16 NOTE — HISTORY OF PRESENT ILLNESS
[FreeTextEntry1] : 54 year old with history of breast cancer, underwent bilateral mastectomy with reconstructive implants in 2012. Treated with tamoxifen x 8 years.  Developed mechanical issues with implants in mid-2023, saw Dr. Levy for potential revision. Ultimately found to have rupture of right implant. Underwent revision on 12/12/23. Right sided drain removed on 12/28. Initially everything fine, but that evening developed dizziness, nausea, chills, fever to 102, tenderness, swelling and redness of right breast.  Went to [facility] where was found to be hypotensive to 80/?.  No positive cultures from admission (blood cultures negative). Treated with Vanco/Zosyn x 5 days while inpatient, changed  oral Cefdinir on discharge; developed total body rash on cefdinir. Self D/C'd, saw Dr. Levy - given TMP/SMX which she took x 1 week; finished about 1 week ago.  Planning for HÉCTOR flap instead of implants. Scheduled for February 6th.  Presurgery  - Does not have infection-type complaints in right breast today. No redness or tenderness.   2/16/24: Given 2 weeks antibiotics post op - bactrim BID and levaquin 750 mg daily.  Will be completed next week.  Abdomen site developed redness.  Redness midline not spreading though outside of marker.  Drains in place b/l.   No fevers.

## 2024-02-16 NOTE — ASSESSMENT
[FreeTextEntry1] : 54 year old with cellulitis of right breast after surgical replacement of implants. Responded rapidly to treatment suggesting limited to skin, but ultrasound with some suggestion of deeper involvement. Also with probably cephalosporin allergy. Plan is for HÉCTOR flap reconstruction on 2/6.  S/p surgery now.   Redness noted at abdominal incision but seems more c/w normal post op erythema.    Advised she continue the current antibiotics - bactrim BID and levaquin as prescribed.  She should f/up next week again with ID to ensure not worsening.   Keep area clean follow protocol as per surgeon.  Breasts seem okay for now. Some purple hue to right breast.  To be monitored. Incisions clean and dry.    Avoiding cephalosporin given potential allergy.   RTC 5 days.

## 2024-02-20 ENCOUNTER — APPOINTMENT (OUTPATIENT)
Dept: PLASTIC SURGERY | Facility: CLINIC | Age: 54
End: 2024-02-20
Payer: COMMERCIAL

## 2024-02-20 VITALS
SYSTOLIC BLOOD PRESSURE: 120 MMHG | HEART RATE: 88 BPM | HEIGHT: 64.5 IN | OXYGEN SATURATION: 98 % | WEIGHT: 149 LBS | BODY MASS INDEX: 25.13 KG/M2 | RESPIRATION RATE: 16 BRPM | DIASTOLIC BLOOD PRESSURE: 80 MMHG

## 2024-02-20 PROCEDURE — 99024 POSTOP FOLLOW-UP VISIT: CPT

## 2024-02-21 ENCOUNTER — APPOINTMENT (OUTPATIENT)
Dept: INFECTIOUS DISEASE | Facility: CLINIC | Age: 54
End: 2024-02-21
Payer: COMMERCIAL

## 2024-02-21 VITALS
DIASTOLIC BLOOD PRESSURE: 76 MMHG | TEMPERATURE: 97.6 F | WEIGHT: 150 LBS | BODY MASS INDEX: 25.3 KG/M2 | OXYGEN SATURATION: 96 % | HEART RATE: 83 BPM | SYSTOLIC BLOOD PRESSURE: 116 MMHG | HEIGHT: 64.5 IN

## 2024-02-21 DIAGNOSIS — S31.109A UNSPECIFIED OPEN WOUND OF ABDOMINAL WALL, UNSPECIFIED QUADRANT W/OUT PENETRATION INTO PERITONEAL CAVITY, INITIAL ENCOUNTER: ICD-10-CM

## 2024-02-21 PROCEDURE — 99212 OFFICE O/P EST SF 10 MIN: CPT

## 2024-02-23 ENCOUNTER — APPOINTMENT (OUTPATIENT)
Dept: PLASTIC SURGERY | Facility: CLINIC | Age: 54
End: 2024-02-23
Payer: COMMERCIAL

## 2024-02-23 ENCOUNTER — APPOINTMENT (OUTPATIENT)
Dept: INFECTIOUS DISEASE | Facility: CLINIC | Age: 54
End: 2024-02-23
Payer: COMMERCIAL

## 2024-02-23 VITALS
TEMPERATURE: 97.6 F | SYSTOLIC BLOOD PRESSURE: 117 MMHG | DIASTOLIC BLOOD PRESSURE: 81 MMHG | BODY MASS INDEX: 26.91 KG/M2 | WEIGHT: 150 LBS | HEIGHT: 62.5 IN | HEART RATE: 80 BPM | OXYGEN SATURATION: 98 %

## 2024-02-23 DIAGNOSIS — L03.90 CELLULITIS, UNSPECIFIED: ICD-10-CM

## 2024-02-23 PROCEDURE — 99024 POSTOP FOLLOW-UP VISIT: CPT

## 2024-02-23 PROCEDURE — 99212 OFFICE O/P EST SF 10 MIN: CPT

## 2024-02-23 RX ORDER — LINEZOLID 600 MG/1
600 TABLET, FILM COATED ORAL
Qty: 14 | Refills: 0 | Status: ACTIVE | COMMUNITY
Start: 2024-02-23 | End: 1900-01-01

## 2024-02-23 NOTE — HISTORY OF PRESENT ILLNESS
[FreeTextEntry1] : Patient reports lower abdominal component feels same or maybe a little improved Right drain site is more tender/painful per patient No fever, chills. No drainage around right drain.

## 2024-02-23 NOTE — PHYSICAL EXAM
[FreeTextEntry1] : Abdominal wound with less eschar, deep erythema only at edges of wound, dry, no drainage appreciated. Wound well-healing. Right drain site with larger area of erythema superior to drain (c/w 2/21), patient reports tenderness to touch.

## 2024-02-23 NOTE — REASON FOR VISIT
[Home] : at home, [unfilled] , at the time of the visit. [Patient] : the patient [Medical Office: ([facility])___] : at the medical office located in  [Follow-Up: _____] : a [unfilled] follow-up visit [FreeTextEntry1] : Skin changes around wound

## 2024-02-23 NOTE — ASSESSMENT
[FreeTextEntry1] : Abdominal wound does not appear infected Cannot r/o infection vs. irritation around right drain site.  Given patient's overall risk profile and history and development while on TMP/SMX, will treat with Linezolid 600 mg BID (patient counseled not to take tramadol while on Linezolid - reports has not taken in a while) x 7 days maximum. Actual duration to be determined when sees Dr. Levy on 2.26. Advised to take photograph today so Dr. Levy can see change between today and 2/26.

## 2024-02-26 ENCOUNTER — OUTPATIENT (OUTPATIENT)
Dept: OUTPATIENT SERVICES | Facility: HOSPITAL | Age: 54
LOS: 1 days | End: 2024-02-26
Payer: COMMERCIAL

## 2024-02-26 ENCOUNTER — APPOINTMENT (OUTPATIENT)
Dept: ULTRASOUND IMAGING | Facility: CLINIC | Age: 54
End: 2024-02-26
Payer: COMMERCIAL

## 2024-02-26 ENCOUNTER — APPOINTMENT (OUTPATIENT)
Dept: PLASTIC SURGERY | Facility: CLINIC | Age: 54
End: 2024-02-26
Payer: COMMERCIAL

## 2024-02-26 VITALS
OXYGEN SATURATION: 98 % | HEIGHT: 62.5 IN | RESPIRATION RATE: 16 BRPM | BODY MASS INDEX: 26.91 KG/M2 | DIASTOLIC BLOOD PRESSURE: 80 MMHG | SYSTOLIC BLOOD PRESSURE: 125 MMHG | HEART RATE: 85 BPM | WEIGHT: 150 LBS

## 2024-02-26 DIAGNOSIS — C50.919 MALIGNANT NEOPLASM OF UNSPECIFIED SITE OF UNSPECIFIED FEMALE BREAST: ICD-10-CM

## 2024-02-26 DIAGNOSIS — Z90.13 ACQUIRED ABSENCE OF BILATERAL BREASTS AND NIPPLES: Chronic | ICD-10-CM

## 2024-02-26 DIAGNOSIS — Z90.13 ACQUIRED ABSENCE OF BILATERAL BREASTS AND NIPPLES: ICD-10-CM

## 2024-02-26 DIAGNOSIS — Z98.890 OTHER SPECIFIED POSTPROCEDURAL STATES: Chronic | ICD-10-CM

## 2024-02-26 PROCEDURE — 93971 EXTREMITY STUDY: CPT | Mod: 26,RT

## 2024-02-26 PROCEDURE — 93971 EXTREMITY STUDY: CPT

## 2024-02-26 PROCEDURE — 99024 POSTOP FOLLOW-UP VISIT: CPT

## 2024-03-01 ENCOUNTER — APPOINTMENT (OUTPATIENT)
Dept: PLASTIC SURGERY | Facility: CLINIC | Age: 54
End: 2024-03-01
Payer: COMMERCIAL

## 2024-03-01 PROCEDURE — 99024 POSTOP FOLLOW-UP VISIT: CPT

## 2024-03-04 ENCOUNTER — APPOINTMENT (OUTPATIENT)
Dept: PLASTIC SURGERY | Facility: CLINIC | Age: 54
End: 2024-03-04

## 2024-03-05 ENCOUNTER — APPOINTMENT (OUTPATIENT)
Dept: PLASTIC SURGERY | Facility: CLINIC | Age: 54
End: 2024-03-05

## 2024-03-06 LAB
CULTURE RESULTS: SIGNIFICANT CHANGE UP
CULTURE RESULTS: SIGNIFICANT CHANGE UP
SPECIMEN SOURCE: SIGNIFICANT CHANGE UP
SPECIMEN SOURCE: SIGNIFICANT CHANGE UP

## 2024-03-07 ENCOUNTER — APPOINTMENT (OUTPATIENT)
Dept: PLASTIC SURGERY | Facility: CLINIC | Age: 54
End: 2024-03-07
Payer: COMMERCIAL

## 2024-03-07 VITALS
OXYGEN SATURATION: 98 % | SYSTOLIC BLOOD PRESSURE: 124 MMHG | BODY MASS INDEX: 27.6 KG/M2 | HEART RATE: 89 BPM | RESPIRATION RATE: 16 BRPM | HEIGHT: 62 IN | TEMPERATURE: 97.9 F | WEIGHT: 150 LBS | DIASTOLIC BLOOD PRESSURE: 85 MMHG

## 2024-03-07 PROCEDURE — 99024 POSTOP FOLLOW-UP VISIT: CPT

## 2024-03-14 ENCOUNTER — APPOINTMENT (OUTPATIENT)
Dept: PLASTIC SURGERY | Facility: CLINIC | Age: 54
End: 2024-03-14
Payer: COMMERCIAL

## 2024-03-14 VITALS
RESPIRATION RATE: 16 BRPM | DIASTOLIC BLOOD PRESSURE: 86 MMHG | BODY MASS INDEX: 27.6 KG/M2 | SYSTOLIC BLOOD PRESSURE: 136 MMHG | WEIGHT: 150 LBS | HEIGHT: 62 IN | OXYGEN SATURATION: 98 % | HEART RATE: 86 BPM

## 2024-03-14 DIAGNOSIS — R39.9 UNSPECIFIED SYMPTOMS AND SIGNS INVOLVING THE GENITOURINARY SYSTEM: ICD-10-CM

## 2024-03-14 PROCEDURE — 99024 POSTOP FOLLOW-UP VISIT: CPT

## 2024-03-14 RX ORDER — NITROFURANTOIN (MONOHYDRATE/MACROCRYSTALS) 25; 75 MG/1; MG/1
100 CAPSULE ORAL
Qty: 10 | Refills: 0 | Status: ACTIVE | COMMUNITY
Start: 2024-03-14 | End: 1900-01-01

## 2024-03-21 ENCOUNTER — APPOINTMENT (OUTPATIENT)
Dept: PLASTIC SURGERY | Facility: CLINIC | Age: 54
End: 2024-03-21
Payer: COMMERCIAL

## 2024-03-21 PROCEDURE — 99024 POSTOP FOLLOW-UP VISIT: CPT

## 2024-03-27 ENCOUNTER — APPOINTMENT (OUTPATIENT)
Dept: PLASTIC SURGERY | Facility: CLINIC | Age: 54
End: 2024-03-27
Payer: COMMERCIAL

## 2024-03-27 PROCEDURE — 99024 POSTOP FOLLOW-UP VISIT: CPT

## 2024-04-05 ENCOUNTER — APPOINTMENT (OUTPATIENT)
Dept: PLASTIC SURGERY | Facility: CLINIC | Age: 54
End: 2024-04-05
Payer: COMMERCIAL

## 2024-04-05 VITALS
OXYGEN SATURATION: 97 % | HEIGHT: 62 IN | DIASTOLIC BLOOD PRESSURE: 83 MMHG | SYSTOLIC BLOOD PRESSURE: 145 MMHG | WEIGHT: 150 LBS | TEMPERATURE: 97.8 F | HEART RATE: 94 BPM | BODY MASS INDEX: 27.6 KG/M2

## 2024-04-05 PROCEDURE — 99024 POSTOP FOLLOW-UP VISIT: CPT

## 2024-04-29 ENCOUNTER — APPOINTMENT (OUTPATIENT)
Dept: PLASTIC SURGERY | Facility: CLINIC | Age: 54
End: 2024-04-29

## 2024-05-09 ENCOUNTER — APPOINTMENT (OUTPATIENT)
Dept: PLASTIC SURGERY | Facility: CLINIC | Age: 54
End: 2024-05-09

## 2024-05-09 VITALS
HEART RATE: 89 BPM | WEIGHT: 150 LBS | BODY MASS INDEX: 27.6 KG/M2 | OXYGEN SATURATION: 97 % | SYSTOLIC BLOOD PRESSURE: 138 MMHG | RESPIRATION RATE: 16 BRPM | DIASTOLIC BLOOD PRESSURE: 80 MMHG | HEIGHT: 62 IN

## 2024-05-09 PROCEDURE — 99213 OFFICE O/P EST LOW 20 MIN: CPT

## 2024-05-16 ENCOUNTER — APPOINTMENT (OUTPATIENT)
Dept: PLASTIC SURGERY | Facility: CLINIC | Age: 54
End: 2024-05-16

## 2024-05-17 ENCOUNTER — APPOINTMENT (OUTPATIENT)
Dept: PHYSICAL MEDICINE AND REHAB | Facility: CLINIC | Age: 54
End: 2024-05-17
Payer: COMMERCIAL

## 2024-05-17 DIAGNOSIS — R53.1 WEAKNESS: ICD-10-CM

## 2024-05-17 PROCEDURE — 99205 OFFICE O/P NEW HI 60 MIN: CPT

## 2024-05-17 NOTE — PHYSICAL EXAM
[FreeTextEntry1] : GEN: AAOx3, NAD.  PSYCH: Normal mood and affect. Responds appropriately to commands. EYES: Sclerae Anicteric. No discharge. EOMI. RESP: Breathing unlabored. CV: Radial pulses 2+ and equal. No varicosities noted. EXT: No C/C/E.  LUE at 10 cm below olecranon 23cm, at 10 cm above olecranon 27 cm RUE at 10 cm below olecranon 23.5 cm, at 10 cm above olecranon 27 cm SKIN: No lesions noted. Incisions well healed,  no erythema or swelling  LYMPH: No supraclavicular, anterior or posterior cervical lymphadenopathy appreciated. GAIT: Non antalgic, Normal reciprocating heel to toe. STRENGTH: 5/5 bilateral upper and lower extremities REFLEXES: 2+ symmetric brachioradialis SENSATION: Grossly intact to light touch bilateral upper extremities. INSP: no visible swelling appreciated + healed dixon flap, scar tissue palpable around incision.  CERVICAL ROM: Flexion, extension, side-bending, rotation, oblique extension all full and pain free.   SHOULDER ROM: Full and pain free bilaterally. PALP: There is no tenderness over the midline spinous processes, paravertebral muscles, trapezius, levator scapulae or shoulder region bilaterally.  No axillary tenderness appreciated SPECIAL:  no axillary cording on exam

## 2024-05-17 NOTE — HISTORY OF PRESENT ILLNESS
[FreeTextEntry1] : Lorena Nunez is a 54 year old female pmh L breast ca (ER positive, ID positive, HER2 dirk negative; 0/2 left sentinel lymph nodes) s/p b/l mastectomy with implants in 2013.  She had pain since that time, was found to have ruptured implant R breast in, underwent implant exchange on 12/12/23 with Dr. Cooper.  She had infection which required implant removal, she underwent removal in 2/6/23 with dixon flap with Dr. Levy. Cleared for PT in April, found to have seroma.  Denies symptoms of lymphedema in the arm.   She is , lives with her  in house with stairs.   She works as a .     She has been working with lymphedema therapy to address tightness around dixon flap.  Also reports crampy pain at times.  feels tightness improved with electric massager.   She also notes some discomfort R chest wall and feels there may be residual swelling since original surgery. Denies symptoms in upper extremities or axilla.   allergic to latex.  feels occasional achy/heavy discomfort R lateral chest wall states she feels better with abdominal compression.

## 2024-05-17 NOTE — ASSESSMENT
[FreeTextEntry1] : patient continuing lymphedema therapy/scar tissue mobilization, to add treatment for R chest wall. then start pelvic floor therapy for strengthening, abdominal tightness, discomfort after dixon flap follow up in 6 weeks I spent a total of 60 minutes on this encounter including documentation, face to face time, care coordination and reviewing prior records.

## 2024-05-24 ENCOUNTER — EMERGENCY (EMERGENCY)
Facility: HOSPITAL | Age: 54
LOS: 1 days | Discharge: HOME | End: 2024-05-24
Attending: EMERGENCY MEDICINE
Payer: COMMERCIAL

## 2024-05-24 ENCOUNTER — APPOINTMENT (OUTPATIENT)
Dept: PULMONOLOGY | Facility: CLINIC | Age: 54
End: 2024-05-24

## 2024-05-24 VITALS
TEMPERATURE: 98 F | DIASTOLIC BLOOD PRESSURE: 69 MMHG | HEART RATE: 93 BPM | SYSTOLIC BLOOD PRESSURE: 108 MMHG | RESPIRATION RATE: 98 BRPM | WEIGHT: 147.05 LBS

## 2024-05-24 DIAGNOSIS — Z98.890 OTHER SPECIFIED POSTPROCEDURAL STATES: Chronic | ICD-10-CM

## 2024-05-24 DIAGNOSIS — Z90.13 ACQUIRED ABSENCE OF BILATERAL BREASTS AND NIPPLES: Chronic | ICD-10-CM

## 2024-05-24 LAB
ALBUMIN SERPL ELPH-MCNC: 4.4 G/DL — SIGNIFICANT CHANGE UP (ref 3.3–5)
ALP SERPL-CCNC: 69 U/L — SIGNIFICANT CHANGE UP (ref 40–120)
ALT FLD-CCNC: 10 U/L — SIGNIFICANT CHANGE UP (ref 10–45)
ANION GAP SERPL CALC-SCNC: 11 MMOL/L — SIGNIFICANT CHANGE UP (ref 5–17)
AST SERPL-CCNC: 16 U/L — SIGNIFICANT CHANGE UP (ref 10–40)
BASOPHILS # BLD AUTO: 0.09 K/UL — SIGNIFICANT CHANGE UP (ref 0–0.2)
BASOPHILS NFR BLD AUTO: 0.9 % — SIGNIFICANT CHANGE UP (ref 0–2)
BILIRUB SERPL-MCNC: 0.1 MG/DL — LOW (ref 0.2–1.2)
BUN SERPL-MCNC: 16 MG/DL — SIGNIFICANT CHANGE UP (ref 7–23)
CALCIUM SERPL-MCNC: 10 MG/DL — SIGNIFICANT CHANGE UP (ref 8.4–10.5)
CHLORIDE SERPL-SCNC: 103 MMOL/L — SIGNIFICANT CHANGE UP (ref 96–108)
CO2 SERPL-SCNC: 26 MMOL/L — SIGNIFICANT CHANGE UP (ref 22–31)
CREAT SERPL-MCNC: 0.66 MG/DL — SIGNIFICANT CHANGE UP (ref 0.5–1.3)
EGFR: 105 ML/MIN/1.73M2 — SIGNIFICANT CHANGE UP
EOSINOPHIL # BLD AUTO: 0.25 K/UL — SIGNIFICANT CHANGE UP (ref 0–0.5)
EOSINOPHIL NFR BLD AUTO: 2.6 % — SIGNIFICANT CHANGE UP (ref 0–6)
GLUCOSE SERPL-MCNC: 84 MG/DL — SIGNIFICANT CHANGE UP (ref 70–99)
HCT VFR BLD CALC: 39.3 % — SIGNIFICANT CHANGE UP (ref 34.5–45)
HGB BLD-MCNC: 13.7 G/DL — SIGNIFICANT CHANGE UP (ref 11.5–15.5)
IMM GRANULOCYTES NFR BLD AUTO: 0.4 % — SIGNIFICANT CHANGE UP (ref 0–0.9)
LACTATE BLDV-MCNC: 1.9 MMOL/L — SIGNIFICANT CHANGE UP (ref 0.5–2)
LIDOCAIN IGE QN: 24 U/L — SIGNIFICANT CHANGE UP (ref 7–60)
LYMPHOCYTES # BLD AUTO: 4.01 K/UL — HIGH (ref 1–3.3)
LYMPHOCYTES # BLD AUTO: 41.7 % — SIGNIFICANT CHANGE UP (ref 13–44)
MCHC RBC-ENTMCNC: 31 PG — SIGNIFICANT CHANGE UP (ref 27–34)
MCHC RBC-ENTMCNC: 34.9 GM/DL — SIGNIFICANT CHANGE UP (ref 32–36)
MCV RBC AUTO: 88.9 FL — SIGNIFICANT CHANGE UP (ref 80–100)
MONOCYTES # BLD AUTO: 0.91 K/UL — HIGH (ref 0–0.9)
MONOCYTES NFR BLD AUTO: 9.5 % — SIGNIFICANT CHANGE UP (ref 2–14)
NEUTROPHILS # BLD AUTO: 4.31 K/UL — SIGNIFICANT CHANGE UP (ref 1.8–7.4)
NEUTROPHILS NFR BLD AUTO: 44.9 % — SIGNIFICANT CHANGE UP (ref 43–77)
NRBC # BLD: 0 /100 WBCS — SIGNIFICANT CHANGE UP (ref 0–0)
PLATELET # BLD AUTO: 235 K/UL — SIGNIFICANT CHANGE UP (ref 150–400)
POTASSIUM SERPL-MCNC: 4.4 MMOL/L — SIGNIFICANT CHANGE UP (ref 3.5–5.3)
POTASSIUM SERPL-SCNC: 4.4 MMOL/L — SIGNIFICANT CHANGE UP (ref 3.5–5.3)
PROT SERPL-MCNC: 7.1 G/DL — SIGNIFICANT CHANGE UP (ref 6–8.3)
RBC # BLD: 4.42 M/UL — SIGNIFICANT CHANGE UP (ref 3.8–5.2)
RBC # FLD: 13.2 % — SIGNIFICANT CHANGE UP (ref 10.3–14.5)
SODIUM SERPL-SCNC: 140 MMOL/L — SIGNIFICANT CHANGE UP (ref 135–145)
WBC # BLD: 9.61 K/UL — SIGNIFICANT CHANGE UP (ref 3.8–10.5)
WBC # FLD AUTO: 9.61 K/UL — SIGNIFICANT CHANGE UP (ref 3.8–10.5)

## 2024-05-24 PROCEDURE — 83605 ASSAY OF LACTIC ACID: CPT

## 2024-05-24 PROCEDURE — 71045 X-RAY EXAM CHEST 1 VIEW: CPT

## 2024-05-24 PROCEDURE — 73110 X-RAY EXAM OF WRIST: CPT | Mod: 26,RT

## 2024-05-24 PROCEDURE — 73130 X-RAY EXAM OF HAND: CPT | Mod: 26,RT

## 2024-05-24 PROCEDURE — 76705 ECHO EXAM OF ABDOMEN: CPT | Mod: 26

## 2024-05-24 PROCEDURE — 73110 X-RAY EXAM OF WRIST: CPT

## 2024-05-24 PROCEDURE — 70450 CT HEAD/BRAIN W/O DYE: CPT | Mod: MC

## 2024-05-24 PROCEDURE — 76705 ECHO EXAM OF ABDOMEN: CPT

## 2024-05-24 PROCEDURE — 71045 X-RAY EXAM CHEST 1 VIEW: CPT | Mod: 26

## 2024-05-24 PROCEDURE — 36415 COLL VENOUS BLD VENIPUNCTURE: CPT

## 2024-05-24 PROCEDURE — 29130 APPL FINGER SPLINT STATIC: CPT | Mod: RT

## 2024-05-24 PROCEDURE — 99285 EMERGENCY DEPT VISIT HI MDM: CPT | Mod: 25

## 2024-05-24 PROCEDURE — 96375 TX/PRO/DX INJ NEW DRUG ADDON: CPT

## 2024-05-24 PROCEDURE — 73130 X-RAY EXAM OF HAND: CPT

## 2024-05-24 PROCEDURE — 99284 EMERGENCY DEPT VISIT MOD MDM: CPT | Mod: 25

## 2024-05-24 PROCEDURE — 96374 THER/PROPH/DIAG INJ IV PUSH: CPT

## 2024-05-24 PROCEDURE — 80053 COMPREHEN METABOLIC PANEL: CPT

## 2024-05-24 PROCEDURE — 85025 COMPLETE CBC W/AUTO DIFF WBC: CPT

## 2024-05-24 PROCEDURE — 29125 APPL SHORT ARM SPLINT STATIC: CPT | Mod: RT

## 2024-05-24 PROCEDURE — 83690 ASSAY OF LIPASE: CPT

## 2024-05-24 RX ORDER — ONDANSETRON 8 MG/1
4 TABLET, FILM COATED ORAL ONCE
Refills: 0 | Status: COMPLETED | OUTPATIENT
Start: 2024-05-24 | End: 2024-05-24

## 2024-05-24 RX ORDER — ACETAMINOPHEN 500 MG
1000 TABLET ORAL ONCE
Refills: 0 | Status: COMPLETED | OUTPATIENT
Start: 2024-05-24 | End: 2024-05-24

## 2024-05-24 RX ADMIN — Medication 400 MILLIGRAM(S): at 20:04

## 2024-05-24 RX ADMIN — ONDANSETRON 4 MILLIGRAM(S): 8 TABLET, FILM COATED ORAL at 20:04
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
## 2024-05-24 NOTE — ED ADULT NURSE REASSESSMENT NOTE - NS ED NURSE REASSESS COMMENT FT1
pt requesting peripheral IV be removed because she does not want it in. pt states she is going home after her ultrasound is done. MD Braun and MD Pyle made aware. IV removed.
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
## 2024-05-24 NOTE — ED PROVIDER NOTE - NSFOLLOWUPCLINICS_GEN_ALL_ED_FT
[facility] Orthopedic Surgery  Orthopedic Surgery  [address], [address]rd & [address]th [place] [address]  Phone: [phone]  Fax:
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
## 2024-05-24 NOTE — ED PROVIDER NOTE - GASTROINTESTINAL, MLM
abdomen scar   after recent reconstruction  soft, mild tenderness in the low abdomen no evidence of secondary infection
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
## 2024-05-24 NOTE — ED PROVIDER NOTE - CONSTITUTIONAL, MLM
normal... Well appearing, awake, alert, oriented to person, place, time/situation and in no apparent distress. very anxious

## 2024-05-24 NOTE — ED PROVIDER NOTE - CARE PROVIDER_API CALL
Dmitriy Theodore  [address] [place] [address]  Phone: [phone]  Fax: [phone]  Established Patient  Follow Up Time: 1-3 Days

## 2024-05-24 NOTE — ED PROVIDER NOTE - MUSCULOSKELETAL, MLM
Spine appears normal, range of motion is not limited, no muscle or joint tenderness no neck tenderness or step of  deformity

## 2024-05-24 NOTE — ED PROVIDER NOTE - NSICDXPASTSURGICALHX_GEN_ALL_CORE_FT
PAST SURGICAL HISTORY:  H/O bilateral mastectomy 2012 saline implants    H/O breast reconstruction 12/2023 replaced implants    History of D&C
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
## 2024-05-24 NOTE — ED PROVIDER NOTE - PATIENT PORTAL LINK FT
You can access the FollowMyHealth Patient Portal offered by [facility] by registering at the following website: [email or web address]. By joining [employer]’s FollowMyHealth portal, you will also be able to view your health information using other applications (apps) compatible with our system.

## 2024-05-25 VITALS
TEMPERATURE: 98 F | HEART RATE: 76 BPM | OXYGEN SATURATION: 98 % | SYSTOLIC BLOOD PRESSURE: 125 MMHG | RESPIRATION RATE: 18 BRPM | DIASTOLIC BLOOD PRESSURE: 68 MMHG

## 2024-05-25 PROCEDURE — 70450 CT HEAD/BRAIN W/O DYE: CPT | Mod: 26,MC

## 2024-05-25 NOTE — ED PROCEDURE NOTE - ATTENDING CONTRIBUTION TO CARE
I, EM Attending, Roosevelt Pittman was present for and supervised the critical portions of the procedure performed by the Resident/Fellow Physician or MARBELLA.

## 2024-06-03 ENCOUNTER — APPOINTMENT (OUTPATIENT)
Dept: PLASTIC SURGERY | Facility: CLINIC | Age: 54
End: 2024-06-03

## 2024-06-12 ENCOUNTER — APPOINTMENT (OUTPATIENT)
Dept: PLASTIC SURGERY | Facility: CLINIC | Age: 54
End: 2024-06-12

## 2024-06-12 VITALS
WEIGHT: 150 LBS | RESPIRATION RATE: 16 BRPM | BODY MASS INDEX: 27.6 KG/M2 | DIASTOLIC BLOOD PRESSURE: 73 MMHG | HEART RATE: 73 BPM | TEMPERATURE: 97.9 F | HEIGHT: 62 IN | OXYGEN SATURATION: 97 % | SYSTOLIC BLOOD PRESSURE: 110 MMHG

## 2024-06-12 DIAGNOSIS — Z90.13 ACQUIRED ABSENCE OF BILATERAL BREASTS AND NIPPLES: ICD-10-CM

## 2024-06-12 PROCEDURE — 99213 OFFICE O/P EST LOW 20 MIN: CPT

## 2024-08-13 ENCOUNTER — APPOINTMENT (OUTPATIENT)
Dept: ORTHOPEDIC SURGERY | Facility: CLINIC | Age: 54
End: 2024-08-13

## 2024-08-13 DIAGNOSIS — S63.641D SPRAIN OF METACARPOPHALANGEAL JOINT OF RIGHT THUMB, SUBSEQUENT ENCOUNTER: ICD-10-CM

## 2024-08-13 DIAGNOSIS — M65.4 RADIAL STYLOID TENOSYNOVITIS [DE QUERVAIN]: ICD-10-CM

## 2024-08-13 DIAGNOSIS — M18.11 UNILATERAL PRIMARY OSTEOARTHRITIS OF FIRST CARPOMETACARPAL JOINT, RIGHT HAND: ICD-10-CM

## 2024-08-13 PROCEDURE — 73130 X-RAY EXAM OF HAND: CPT | Mod: RT

## 2024-08-13 PROCEDURE — 20550 NJX 1 TENDON SHEATH/LIGAMENT: CPT | Mod: RT

## 2024-08-13 PROCEDURE — 99204 OFFICE O/P NEW MOD 45 MIN: CPT | Mod: 25

## 2024-08-13 RX ORDER — MELOXICAM 15 MG/1
15 TABLET ORAL
Qty: 30 | Refills: 11 | Status: ACTIVE | COMMUNITY
Start: 2024-08-13 | End: 1900-01-01

## 2024-08-15 ENCOUNTER — APPOINTMENT (OUTPATIENT)
Dept: PLASTIC SURGERY | Facility: CLINIC | Age: 54
End: 2024-08-15
Payer: COMMERCIAL

## 2024-08-15 VITALS
BODY MASS INDEX: 27.6 KG/M2 | RESPIRATION RATE: 16 BRPM | HEART RATE: 70 BPM | HEIGHT: 62 IN | SYSTOLIC BLOOD PRESSURE: 139 MMHG | TEMPERATURE: 98 F | DIASTOLIC BLOOD PRESSURE: 86 MMHG | WEIGHT: 150 LBS | OXYGEN SATURATION: 97 %

## 2024-08-15 PROCEDURE — 99212 OFFICE O/P EST SF 10 MIN: CPT

## 2024-08-16 ENCOUNTER — TRANSCRIPTION ENCOUNTER (OUTPATIENT)
Age: 54
End: 2024-08-16

## 2024-08-16 ENCOUNTER — APPOINTMENT (OUTPATIENT)
Dept: INFECTIOUS DISEASE | Facility: CLINIC | Age: 54
End: 2024-08-16
Payer: COMMERCIAL

## 2024-08-16 VITALS
OXYGEN SATURATION: 96 % | WEIGHT: 147 LBS | HEIGHT: 62 IN | DIASTOLIC BLOOD PRESSURE: 86 MMHG | SYSTOLIC BLOOD PRESSURE: 133 MMHG | TEMPERATURE: 97.9 F | HEART RATE: 76 BPM | BODY MASS INDEX: 27.05 KG/M2

## 2024-08-16 DIAGNOSIS — R23.4 CHANGES IN SKIN TEXTURE: ICD-10-CM

## 2024-08-16 PROCEDURE — 99212 OFFICE O/P EST SF 10 MIN: CPT
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
## 2024-08-16 NOTE — ASSESSMENT
[FreeTextEntry1] : History of prior infection overlying breast prosthesis, s/p explant and HÉCTOR flaps now fully healed, presents with concerns of possible cellulitis because of one day of increased redness of skin and tenderness of skin. Based on current appearance, do not feel has active infection. Advised patient that she should monitor the site and if worsens can contact office to re-evaluation (telehealth).   Advised patient in response to questions/concerns that now that all material in that area is native human tissue, she has good immunity and protection from infection, and that this far out from surgery she has good healing and should not have surgery-related infection. Also advised that cellulitis recurrences are not caused by bacteria remaining in the skin but rather require re-infection events, so no need to worry about flare of infection that was nascent at the site.   Patient to call Monday and advise how skin looks; to contact on-call MD over weekend if worsens.

## 2024-08-16 NOTE — PHYSICAL EXAM
[FreeTextEntry1] : Right breast with very pale pink skin along well-healed superior suture line, no warmth, reportedly tender, no fluctuance. Appearance similar to left breast.  8

## 2024-08-19 ENCOUNTER — APPOINTMENT (OUTPATIENT)
Dept: PLASTIC SURGERY | Facility: CLINIC | Age: 54
End: 2024-08-19

## 2024-08-19 DIAGNOSIS — Z90.13 ACQUIRED ABSENCE OF BILATERAL BREASTS AND NIPPLES: ICD-10-CM

## 2024-08-22 NOTE — ASSESSMENT
[FreeTextEntry1] : All medical record entries were at my, Dr. Prabhakar Levy, direction and personally dictated by me. I have reviewed the chart and agree that the record accurately reflects my personal performance of the history, physical exam, assessment and plan.

## 2024-08-22 NOTE — REASON FOR VISIT
[Post Op: _________] : a [unfilled] post op visit [FreeTextEntry1] : s/p bilateral breast reconstruction with HÉCTOR flaps following removal of bilateral breast implants 02/06/24.

## 2024-08-23 NOTE — ASU PATIENT PROFILE, ADULT - ANESTHESIA, PREVIOUS REACTION, PROFILE
U/s reviewed.   Has been waking up from leg cramps, has been looking for a kosher magnesium, discussed adding potassium as well.  Some increased work of breathing mostly positional.   Has noted overall slower movement, discussed starting PNT twice weekly, also due to growth.  Consents signed today.       
none

## 2024-09-06 ENCOUNTER — APPOINTMENT (OUTPATIENT)
Dept: PLASTIC SURGERY | Facility: CLINIC | Age: 54
End: 2024-09-06
Payer: COMMERCIAL

## 2024-09-06 VITALS
WEIGHT: 147 LBS | TEMPERATURE: 98.6 F | DIASTOLIC BLOOD PRESSURE: 83 MMHG | HEART RATE: 89 BPM | OXYGEN SATURATION: 95 % | SYSTOLIC BLOOD PRESSURE: 125 MMHG | HEIGHT: 62 IN | BODY MASS INDEX: 27.05 KG/M2

## 2024-09-06 DIAGNOSIS — Z90.13 ACQUIRED ABSENCE OF BILATERAL BREASTS AND NIPPLES: ICD-10-CM

## 2024-09-06 PROCEDURE — 99024 POSTOP FOLLOW-UP VISIT: CPT

## 2024-09-17 ENCOUNTER — APPOINTMENT (OUTPATIENT)
Dept: OBGYN | Facility: CLINIC | Age: 54
End: 2024-09-17
Payer: COMMERCIAL

## 2024-09-17 VITALS
BODY MASS INDEX: 27.23 KG/M2 | DIASTOLIC BLOOD PRESSURE: 85 MMHG | HEIGHT: 62 IN | SYSTOLIC BLOOD PRESSURE: 146 MMHG | HEART RATE: 94 BPM | WEIGHT: 148 LBS

## 2024-09-17 DIAGNOSIS — N95.9 UNSPECIFIED MENOPAUSAL AND PERIMENOPAUSAL DISORDER: ICD-10-CM

## 2024-09-17 DIAGNOSIS — Z01.419 ENCOUNTER FOR GYNECOLOGICAL EXAMINATION (GENERAL) (ROUTINE) W/OUT ABNORMAL FINDINGS: ICD-10-CM

## 2024-09-17 DIAGNOSIS — C50.919 MALIGNANT NEOPLASM OF UNSPECIFIED SITE OF UNSPECIFIED FEMALE BREAST: ICD-10-CM

## 2024-09-17 DIAGNOSIS — F31.9 BIPOLAR DISORDER, UNSPECIFIED: ICD-10-CM

## 2024-09-17 LAB
BILIRUB UR QL STRIP: NEGATIVE
CLARITY UR: CLEAR
COLLECTION METHOD: NORMAL
GLUCOSE UR-MCNC: NEGATIVE
HCG UR QL: 0.2 EU/DL
HGB UR QL STRIP.AUTO: NORMAL
KETONES UR-MCNC: NEGATIVE
LEUKOCYTE ESTERASE UR QL STRIP: NORMAL
NITRITE UR QL STRIP: NEGATIVE
PH UR STRIP: 6.5
PROT UR STRIP-MCNC: NEGATIVE
SP GR UR STRIP: 1.02

## 2024-09-17 PROCEDURE — 99396 PREV VISIT EST AGE 40-64: CPT

## 2024-09-17 PROCEDURE — 99459 PELVIC EXAMINATION: CPT

## 2024-09-17 PROCEDURE — 99214 OFFICE O/P EST MOD 30 MIN: CPT | Mod: 25

## 2024-09-17 RX ORDER — LAMOTRIGINE 200 MG/1
200 TABLET ORAL
Refills: 0 | Status: ACTIVE | COMMUNITY

## 2024-09-17 NOTE — HISTORY OF PRESENT ILLNESS
[FreeTextEntry1] : 54 y.o. P 1 postmenopausal female, presents for annual exam,. pt feels well today, PMH- Breast ca - left, ,  Bipolar disorder  on Lamictal , Depakote and Effexor, PSHx- bilateral mastectomy, with reconstruction,  FH- breast ca- mat. aunt, colon ca- MGM,  SH- negative, GYN hx - D&C for endometrial thickening on Tamoxifen. OB hx -  x 1.  No colonoscopy No Dexa,

## 2024-09-17 NOTE — DISCUSSION/SUMMARY
[FreeTextEntry1] : A - WWV        Bipolar disorder      Breast ca     hx of cervical HPV       atrophic vaginitis  p- f/u 1 year     pap done     referral for pelvic sono to assess EM thickness s/p Tamoxifen     exercise encouraged     nutritional counseling provided     referral for Dexa given    referral for baseline colorectal screening given,

## 2024-09-17 NOTE — PHYSICAL EXAM
[Chaperone Present] : A chaperone was present in the examining room during all aspects of the physical examination [[id number]] : A chaperone was present during the pelvic exam. [FreeTextEntry2] : Monica [Vulvar Atrophy] : vulvar atrophy [Labia Majora] : normal [Labia Minora] : normal [Atrophy] : atrophy [Normal] : normal [Uterine Adnexae] : normal

## 2024-09-18 ENCOUNTER — ASOB RESULT (OUTPATIENT)
Age: 54
End: 2024-09-18

## 2024-09-18 ENCOUNTER — APPOINTMENT (OUTPATIENT)
Dept: OBGYN | Facility: CLINIC | Age: 54
End: 2024-09-18
Payer: COMMERCIAL

## 2024-09-18 LAB — HPV HIGH+LOW RISK DNA PNL CVX: NOT DETECTED

## 2024-09-18 PROCEDURE — 76830 TRANSVAGINAL US NON-OB: CPT

## 2024-09-20 LAB — BACTERIA UR CULT: NORMAL

## 2024-09-23 LAB — CYTOLOGY CVX/VAG DOC THIN PREP: ABNORMAL

## 2024-11-05 ENCOUNTER — APPOINTMENT (OUTPATIENT)
Dept: ORTHOPEDIC SURGERY | Facility: CLINIC | Age: 54
End: 2024-11-05
Payer: COMMERCIAL

## 2024-11-05 DIAGNOSIS — M65.311 TRIGGER THUMB, RIGHT THUMB: ICD-10-CM

## 2024-11-05 DIAGNOSIS — M65.4 RADIAL STYLOID TENOSYNOVITIS [DE QUERVAIN]: ICD-10-CM

## 2024-11-05 PROCEDURE — 20550 NJX 1 TENDON SHEATH/LIGAMENT: CPT | Mod: RT

## 2024-11-05 PROCEDURE — 99214 OFFICE O/P EST MOD 30 MIN: CPT | Mod: 25

## 2024-12-12 NOTE — ASU DISCHARGE PLAN (ADULT/PEDIATRIC) - FOLLOW UP APPOINTMENTS
with patient [facility], Ambulatory Surgery Unit [facility], Ambulatory Surgery Unit

## 2024-12-28 ENCOUNTER — OUTPATIENT (OUTPATIENT)
Dept: OUTPATIENT SERVICES | Facility: HOSPITAL | Age: 54
LOS: 1 days | Discharge: HOME | End: 2024-12-28

## 2024-12-28 DIAGNOSIS — Z90.13 ACQUIRED ABSENCE OF BILATERAL BREASTS AND NIPPLES: Chronic | ICD-10-CM

## 2024-12-28 DIAGNOSIS — Z98.890 OTHER SPECIFIED POSTPROCEDURAL STATES: Chronic | ICD-10-CM

## 2024-12-28 DIAGNOSIS — C50.919 MALIGNANT NEOPLASM OF UNSPECIFIED SITE OF UNSPECIFIED FEMALE BREAST: ICD-10-CM

## 2025-01-02 NOTE — PATIENT PROFILE ADULT - FUNCTIONAL ASSESSMENT - BASIC MOBILITY 3.
Addended by: ADDY KING on: 1/2/2025 03:35 PM     Modules accepted: Orders     4 = No assist / stand by assistance

## 2025-01-03 ENCOUNTER — APPOINTMENT (OUTPATIENT)
Dept: HEMATOLOGY ONCOLOGY | Facility: CLINIC | Age: 55
End: 2025-01-03

## 2025-02-10 ENCOUNTER — APPOINTMENT (OUTPATIENT)
Dept: PLASTIC SURGERY | Facility: CLINIC | Age: 55
End: 2025-02-10

## 2025-03-21 ENCOUNTER — APPOINTMENT (OUTPATIENT)
Dept: HEMATOLOGY ONCOLOGY | Facility: CLINIC | Age: 55
End: 2025-03-21

## 2025-03-31 ENCOUNTER — APPOINTMENT (OUTPATIENT)
Dept: HEMATOLOGY ONCOLOGY | Facility: CLINIC | Age: 55
End: 2025-03-31

## 2025-04-26 ENCOUNTER — NON-APPOINTMENT (OUTPATIENT)
Age: 55
End: 2025-04-26

## 2025-05-27 ENCOUNTER — APPOINTMENT (OUTPATIENT)
Dept: ORTHOPEDIC SURGERY | Facility: CLINIC | Age: 55
End: 2025-05-27

## 2025-05-30 ENCOUNTER — APPOINTMENT (OUTPATIENT)
Dept: UROLOGY | Facility: CLINIC | Age: 55
End: 2025-05-30
Payer: COMMERCIAL

## 2025-05-30 VITALS
TEMPERATURE: 97.4 F | HEIGHT: 62 IN | WEIGHT: 148 LBS | BODY MASS INDEX: 27.23 KG/M2 | SYSTOLIC BLOOD PRESSURE: 138 MMHG | DIASTOLIC BLOOD PRESSURE: 80 MMHG

## 2025-05-30 DIAGNOSIS — C50.919 MALIGNANT NEOPLASM OF UNSPECIFIED SITE OF UNSPECIFIED FEMALE BREAST: ICD-10-CM

## 2025-05-30 DIAGNOSIS — R39.9 UNSPECIFIED SYMPTOMS AND SIGNS INVOLVING THE GENITOURINARY SYSTEM: ICD-10-CM

## 2025-05-30 DIAGNOSIS — Z79.810 LONG TERM (CURRENT) USE OF SELECTIVE ESTROGEN RECEPTOR MODULATORS (SERMS): ICD-10-CM

## 2025-05-30 DIAGNOSIS — Z13.71 ENCOUNTER FOR NONPROCREATIVE SCREENING FOR GENETIC DISEASE CARRIER STATUS: ICD-10-CM

## 2025-05-30 PROBLEM — M62.89 PELVIC FLOOR DYSFUNCTION IN FEMALE: Status: ACTIVE | Noted: 2025-05-30

## 2025-05-30 LAB
BILIRUB UR QL STRIP: NEGATIVE
CLARITY UR: CLEAR
COLLECTION METHOD: NORMAL
GLUCOSE UR-MCNC: NEGATIVE
HCG UR QL: 0.2 EU/DL
HGB UR QL STRIP.AUTO: ABNORMAL
KETONES UR-MCNC: NEGATIVE
LEUKOCYTE ESTERASE UR QL STRIP: NEGATIVE
NITRITE UR QL STRIP: NEGATIVE
PH UR STRIP: 6.5
PROT UR STRIP-MCNC: NEGATIVE
SP GR UR STRIP: 1.01

## 2025-05-30 PROCEDURE — 99459 PELVIC EXAMINATION: CPT

## 2025-05-30 PROCEDURE — 51700 IRRIGATION OF BLADDER: CPT

## 2025-05-30 PROCEDURE — 99205 OFFICE O/P NEW HI 60 MIN: CPT | Mod: 25

## 2025-05-30 PROCEDURE — 81003 URINALYSIS AUTO W/O SCOPE: CPT | Mod: QW

## 2025-05-30 RX ORDER — DIAZEPAM 100 %
POWDER (GRAM) MISCELLANEOUS
Qty: 25 | Refills: 0 | Status: ACTIVE | COMMUNITY
Start: 2025-05-30 | End: 1900-01-01

## 2025-06-02 LAB
APPEARANCE: CLEAR
BACTERIA UR CULT: NORMAL
BACTERIA: NEGATIVE /HPF
BILIRUBIN URINE: NEGATIVE
BLOOD URINE: NEGATIVE
CAST: 0 /LPF
COLOR: YELLOW
EPITHELIAL CELLS: 0 /HPF
GLUCOSE QUALITATIVE U: NEGATIVE MG/DL
KETONES URINE: NEGATIVE MG/DL
LEUKOCYTE ESTERASE URINE: NEGATIVE
MICROSCOPIC-UA: NORMAL
NITRITE URINE: NEGATIVE
PH URINE: 6.5
PROTEIN URINE: NEGATIVE MG/DL
RED BLOOD CELLS URINE: 0 /HPF
SPECIFIC GRAVITY URINE: 1.01
UROBILINOGEN URINE: 0.2 MG/DL
WHITE BLOOD CELLS URINE: 0 /HPF

## 2025-06-05 ENCOUNTER — APPOINTMENT (OUTPATIENT)
Dept: UROLOGY | Facility: CLINIC | Age: 55
End: 2025-06-05
Payer: COMMERCIAL

## 2025-06-05 VITALS
WEIGHT: 148 LBS | BODY MASS INDEX: 27.23 KG/M2 | HEIGHT: 62 IN | SYSTOLIC BLOOD PRESSURE: 120 MMHG | DIASTOLIC BLOOD PRESSURE: 70 MMHG

## 2025-06-05 PROCEDURE — 51700 IRRIGATION OF BLADDER: CPT | Mod: 59

## 2025-06-05 PROCEDURE — 52000 CYSTOURETHROSCOPY: CPT

## 2025-06-05 PROCEDURE — 81003 URINALYSIS AUTO W/O SCOPE: CPT | Mod: QW

## 2025-06-05 PROCEDURE — 99213 OFFICE O/P EST LOW 20 MIN: CPT | Mod: 25

## 2025-06-13 ENCOUNTER — APPOINTMENT (OUTPATIENT)
Dept: UROLOGY | Facility: CLINIC | Age: 55
End: 2025-06-13

## 2025-06-17 ENCOUNTER — APPOINTMENT (OUTPATIENT)
Dept: CT IMAGING | Facility: CLINIC | Age: 55
End: 2025-06-17

## 2025-07-15 ENCOUNTER — APPOINTMENT (OUTPATIENT)
Dept: UROLOGY | Facility: CLINIC | Age: 55
End: 2025-07-15
Payer: COMMERCIAL

## 2025-07-15 VITALS
HEART RATE: 92 BPM | OXYGEN SATURATION: 99 % | DIASTOLIC BLOOD PRESSURE: 70 MMHG | HEIGHT: 62 IN | WEIGHT: 148 LBS | SYSTOLIC BLOOD PRESSURE: 120 MMHG | BODY MASS INDEX: 27.23 KG/M2

## 2025-07-15 PROCEDURE — 99213 OFFICE O/P EST LOW 20 MIN: CPT | Mod: 25

## 2025-07-15 PROCEDURE — 51700 IRRIGATION OF BLADDER: CPT

## 2025-07-15 PROCEDURE — 99459 PELVIC EXAMINATION: CPT

## 2025-07-15 PROCEDURE — 51798 US URINE CAPACITY MEASURE: CPT

## 2025-07-15 RX ORDER — HEPARIN SODIUM 10000 [USP'U]/ML
10000 INJECTION, SOLUTION INTRAVENOUS; SUBCUTANEOUS
Qty: 1 | Refills: 0 | Status: COMPLETED | OUTPATIENT
Start: 2025-07-15

## 2025-07-15 RX ORDER — BUPIVACAINE HYDROCHLORIDE 5 MG/ML
0.5 INJECTION, SOLUTION PERINEURAL
Qty: 0 | Refills: 0 | Status: COMPLETED | OUTPATIENT
Start: 2025-07-15

## 2025-07-15 RX ORDER — LIDOCAINE HYDROCHLORIDE 10 MG/ML
1 INJECTION, SOLUTION INFILTRATION
Refills: 0 | Status: COMPLETED | OUTPATIENT
Start: 2025-07-15

## 2025-07-15 RX ADMIN — LIDOCAINE HYDROCHLORIDE 0 %: 10 INJECTION, SOLUTION INFILTRATION; PERINEURAL at 00:00

## 2025-07-15 RX ADMIN — HEPARIN SODIUM 0 UNIT/ML: 10000 INJECTION INTRAVENOUS; SUBCUTANEOUS at 00:00

## 2025-07-15 RX ADMIN — BUPIVACAINE HYDROCHLORIDE 0 %: 5 INJECTION, SOLUTION EPIDURAL; INTRACAUDAL; PERINEURAL at 00:00

## 2025-07-30 ENCOUNTER — APPOINTMENT (OUTPATIENT)
Dept: UROLOGY | Facility: CLINIC | Age: 55
End: 2025-07-30

## (undated) DEVICE — SOL IRR POUR H2O 500ML

## (undated) DEVICE — DRAPE FLUID WARMER 44 X 44"

## (undated) DEVICE — DRAPE IOBAN 33" X 23"

## (undated) DEVICE — Device

## (undated) DEVICE — TUBING STRYKER HYSTEROSCOPY INFLOW OUTFLOW

## (undated) DEVICE — GOWN LG

## (undated) DEVICE — FRAZIER CONNECTING TUBE 2FT 5MM

## (undated) DEVICE — ONETRAC LIGHTED RETRACTOR 135 X 30MM DISP

## (undated) DEVICE — ELCTR STRYKER NEPTUNE SMOKE EVACUATION PENCIL (GREEN)

## (undated) DEVICE — DRAIN RESERVOIR FOR JACKSON PRATT 100CC CARDINAL

## (undated) DEVICE — SUT QUILL MONODERM 2-0 14CM 26MM UNDYED

## (undated) DEVICE — SUT MAXON 2-0 30" GS-22

## (undated) DEVICE — SPEAR SURG EYE WECK-CELL CELOS

## (undated) DEVICE — TUBING SUCTION NONCONDUCTIVE 6MM X 12FT

## (undated) DEVICE — GLV 7.5 PROTEXIS (CREAM) MICRO

## (undated) DEVICE — SUT VICRYL 2-0 27" CT-1 UNDYED

## (undated) DEVICE — DRAPE INSTRUMENT POUCH 6.75" X 11"

## (undated) DEVICE — TUBING IRR SET FOR CYSTOSCOPY 77"

## (undated) DEVICE — DRSG PAD SANITARY OB

## (undated) DEVICE — WARMING BLANKET LOWER ADULT

## (undated) DEVICE — LONE STAR RETRACTOR RING 12MM BLUNT DISP

## (undated) DEVICE — ELCTR CUTTING LOOP RIGHT ANGLE 24FR

## (undated) DEVICE — DRSG DERMABOND 0.7ML

## (undated) DEVICE — CLAMP MICROVASCULAR SINGLE  1-2MM

## (undated) DEVICE — GLV 7 PROTEXIS (WHITE)

## (undated) DEVICE — DRAPE IRRIGATION POUCH 19X23"

## (undated) DEVICE — PACK PERI GYN

## (undated) DEVICE — MARKING PEN W RULER

## (undated) DEVICE — DRAPE FLUID WARMER 44 X 66"

## (undated) DEVICE — SUT POLYSORB 2-0 30" GS-22 UNDYED

## (undated) DEVICE — CABLE DAC ACTIVE CORD

## (undated) DEVICE — GEL ULTRASOUND 0.25L

## (undated) DEVICE — DOPPLER PROBE  CABLE

## (undated) DEVICE — VENODYNE/SCD SLEEVE CALF MEDIUM

## (undated) DEVICE — SUT PDS II 3-0 27" SH UNDYED

## (undated) DEVICE — SUT VICRYL 0 36" CT-1 UNDYED

## (undated) DEVICE — SOL IRR BAG NS 0.9% 3000ML

## (undated) DEVICE — PROTECTOR HEEL / ELBOW FLUFFY

## (undated) DEVICE — SOL IRR POUR NS 0.9% 1500ML

## (undated) DEVICE — PLV-SCD MACHINE: Type: DURABLE MEDICAL EQUIPMENT

## (undated) DEVICE — SYMPHION FLUID MANAGEMENT DEVICE

## (undated) DEVICE — BIPOLAR FORCEP KIRWAN JEWELERS STR 4" X 0.4MM W 12FT CORD (GREEN)

## (undated) DEVICE — DRSG DERMABOND PRINEO 60CM

## (undated) DEVICE — BASIN SET DOUBLE

## (undated) DEVICE — SOL IRR POUR NS 0.9% 500ML

## (undated) DEVICE — STAPLER SKIN PROXIMATE

## (undated) DEVICE — NDL HYPO REGULAR BEVEL 25G X 1.5" (BLUE)

## (undated) DEVICE — GOWN XL

## (undated) DEVICE — PREP CHLORAPREP HI-LITE ORANGE 26ML

## (undated) DEVICE — TUBING IV EXTENSION MACRO W CLAVE 7"

## (undated) DEVICE — PLV/PSP-ESU FORCE2 FIL 16736T: Type: DURABLE MEDICAL EQUIPMENT

## (undated) DEVICE — ELCTR BOVIE TIP BLADE INSULATED 2.75" EDGE

## (undated) DEVICE — LABELS BLANK W PEN

## (undated) DEVICE — SUT PROLENE 5-0 36" RB-1

## (undated) DEVICE — PACK FREE FLAP

## (undated) DEVICE — DRSG TELFA 3 X 8

## (undated) DEVICE — DRAPE TOWEL BLUE 17" X 24"

## (undated) DEVICE — FRAZIER SUCTION TIP 8FR

## (undated) DEVICE — CLAMP MICROVASCULAR DOUBLE  1-2MM

## (undated) DEVICE — ELCTR BOVIE PENCIL BLADE 10FT

## (undated) DEVICE — DRAPE 3/4 SHEET 52X76"

## (undated) DEVICE — FOLEY TRAY 16FR 5CC LF UMETER CLOSED

## (undated) DEVICE — GLV 6.5 PROTEXIS (WHITE)

## (undated) DEVICE — SOL IRR POUR NS 0.9% 1000ML

## (undated) DEVICE — NDL COUNTER FOAM AND MAGNET 20-40

## (undated) DEVICE — STAPLER SKIN VISI-STAT 35 WIDE

## (undated) DEVICE — DRAPE UNDER BUTTOCKS W SCREEN

## (undated) DEVICE — PACK MINOR

## (undated) DEVICE — PLV/PSP-ESU FORCEFX F8I7418A: Type: DURABLE MEDICAL EQUIPMENT

## (undated) DEVICE — SUT NYLON 2-0 18" FS

## (undated) DEVICE — ELCTR GROUNDING PAD ADULT COVIDIEN

## (undated) DEVICE — PACK MAJOR ABDOMINAL WITH LAP

## (undated) DEVICE — ZIMMER PULSAVAC PLUS FAN KIT

## (undated) DEVICE — BRA PINK 3XL 45-48"

## (undated) DEVICE — WARMING BLANKET FULL ADULT

## (undated) DEVICE — SYR LUER LOK 3CC

## (undated) DEVICE — SYMPHION 3.6MM RESECTING DEVICE

## (undated) DEVICE — TUBING TRUWAVE PRESSURE MALE/FEMALE 12"

## (undated) DEVICE — PRESSURE INFUSOR BAG 1000ML

## (undated) DEVICE — DRAPE SPLIT SHEET 77" X 120"

## (undated) DEVICE — PREP BETADINE SPONGE STICKS

## (undated) DEVICE — ELCTR REM POLYHESIVE ADULT PT RETURN 15FT

## (undated) DEVICE — SOL IRR POUR H2O 1500ML

## (undated) DEVICE — VISITEC 4X4

## (undated) DEVICE — SUT ETHILON 8-0 5" BV130-5

## (undated) DEVICE — GLV 6.5 PROTEXIS (BLUE)

## (undated) DEVICE — SYR ASEPTO

## (undated) DEVICE — POSITIONER STRAP ARMBOARD VELCRO TS-30

## (undated) DEVICE — SUT MONOCRYL 3-0 27" PS-2 UNDYED

## (undated) DEVICE — DRAPE LIGHT HANDLE COVER (GREEN)

## (undated) DEVICE — SUT PLAIN GUT 5-0 12" S-14

## (undated) DEVICE — SOL INJ LR 1000ML

## (undated) DEVICE — TRAP SPECIMEN SPUTUM 40CC

## (undated) DEVICE — MERCIAN VISABILITY BACKROUND YELLOW

## (undated) DEVICE — SYR LUER LOK 10CC

## (undated) DEVICE — KELLER FUNNEL